# Patient Record
Sex: MALE | Race: WHITE | URBAN - METROPOLITAN AREA
[De-identification: names, ages, dates, MRNs, and addresses within clinical notes are randomized per-mention and may not be internally consistent; named-entity substitution may affect disease eponyms.]

---

## 2017-06-12 ENCOUNTER — HOSPITAL ENCOUNTER (OUTPATIENT)
Dept: VASCULAR LAB | Age: 62
Discharge: HOME | End: 2017-06-12
Attending: NURSE PRACTITIONER | Admitting: NURSE PRACTITIONER

## 2017-06-12 DIAGNOSIS — M79.604 PAIN OF RIGHT LEG: ICD-10-CM

## 2017-12-26 PROBLEM — I10 HTN (HYPERTENSION): Status: ACTIVE | Noted: 2017-12-26

## 2017-12-26 PROBLEM — I63.9 CVA (CEREBRAL VASCULAR ACCIDENT) (HCC): Status: ACTIVE | Noted: 2017-12-26

## 2017-12-26 PROBLEM — R47.01 APHASIA: Status: ACTIVE | Noted: 2017-12-26

## 2017-12-26 PROBLEM — I65.29 CAROTID STENOSIS: Status: ACTIVE | Noted: 2017-12-26

## 2017-12-26 PROBLEM — B19.20 HEPATITIS C: Status: ACTIVE | Noted: 2017-12-26

## 2020-11-03 PROBLEM — I10 HTN (HYPERTENSION): Status: RESOLVED | Noted: 2017-12-26 | Resolved: 2020-11-03

## 2023-03-06 LAB
ALBUMIN SERPL-MCNC: 4.4 G/DL
ALP BLD-CCNC: 70 U/L
ALT SERPL-CCNC: 22 U/L
ANION GAP SERPL CALCULATED.3IONS-SCNC: 11 MMOL/L
AST SERPL-CCNC: 25 U/L
BILIRUB SERPL-MCNC: 0.4 MG/DL (ref 0.1–1.4)
BUN BLDV-MCNC: 19 MG/DL
CALCIUM SERPL-MCNC: 10 MG/DL
CHLORIDE BLD-SCNC: 107 MMOL/L
CHOLESTEROL, TOTAL: 138 MG/DL
CHOLESTEROL/HDL RATIO: 3.2
CO2: 25 MMOL/L
CREAT SERPL-MCNC: 1.28 MG/DL
EGFR: 61
GLUCOSE BLD-MCNC: 92 MG/DL
HDLC SERPL-MCNC: 43 MG/DL (ref 35–70)
LDL CHOLESTEROL CALCULATED: 78 MG/DL (ref 0–160)
NONHDLC SERPL-MCNC: NORMAL MG/DL
POTASSIUM SERPL-SCNC: 4.6 MMOL/L
SODIUM BLD-SCNC: 143 MMOL/L
TOTAL PROTEIN: 7.5
TRIGL SERPL-MCNC: 85 MG/DL
VLDLC SERPL CALC-MCNC: NORMAL MG/DL

## 2023-04-17 ENCOUNTER — OFFICE VISIT (OUTPATIENT)
Dept: INTERNAL MEDICINE CLINIC | Age: 68
End: 2023-04-17

## 2023-04-17 VITALS
OXYGEN SATURATION: 98 % | WEIGHT: 169 LBS | HEIGHT: 73 IN | HEART RATE: 79 BPM | BODY MASS INDEX: 22.4 KG/M2 | SYSTOLIC BLOOD PRESSURE: 138 MMHG | DIASTOLIC BLOOD PRESSURE: 74 MMHG

## 2023-04-17 DIAGNOSIS — Z91.09 ENVIRONMENTAL ALLERGIES: ICD-10-CM

## 2023-04-17 DIAGNOSIS — I65.23 BILATERAL CAROTID ARTERY STENOSIS: ICD-10-CM

## 2023-04-17 DIAGNOSIS — Z86.79 HISTORY OF CAROTID STENOSIS: ICD-10-CM

## 2023-04-17 DIAGNOSIS — I10 HTN (HYPERTENSION), BENIGN: ICD-10-CM

## 2023-04-17 DIAGNOSIS — Z00.00 ANNUAL PHYSICAL EXAM: Primary | ICD-10-CM

## 2023-04-17 DIAGNOSIS — F17.200 TOBACCO DEPENDENCE: ICD-10-CM

## 2023-04-17 DIAGNOSIS — E78.5 DYSLIPIDEMIA: ICD-10-CM

## 2023-04-17 DIAGNOSIS — Z23 NEED FOR STREPTOCOCCUS PNEUMONIAE VACCINATION: ICD-10-CM

## 2023-04-17 PROBLEM — R47.01 APHASIA: Status: RESOLVED | Noted: 2017-12-26 | Resolved: 2023-04-17

## 2023-04-17 RX ORDER — BUPROPION HYDROCHLORIDE 150 MG/1
150 TABLET, EXTENDED RELEASE ORAL 2 TIMES DAILY
Qty: 60 TABLET | Refills: 0 | Status: SHIPPED | OUTPATIENT
Start: 2023-04-17

## 2023-04-17 RX ORDER — CETIRIZINE HYDROCHLORIDE 10 MG/1
10 TABLET ORAL NIGHTLY
Qty: 30 TABLET | Refills: 11 | Status: SHIPPED | OUTPATIENT
Start: 2023-04-17

## 2023-04-17 RX ORDER — LORATADINE 10 MG/1
10 TABLET ORAL DAILY
COMMUNITY

## 2023-04-17 RX ORDER — FLUTICASONE PROPIONATE 50 MCG
2 SPRAY, SUSPENSION (ML) NASAL DAILY
Qty: 1 EACH | Refills: 11 | Status: SHIPPED | OUTPATIENT
Start: 2023-04-17

## 2023-04-17 SDOH — ECONOMIC STABILITY: FOOD INSECURITY: WITHIN THE PAST 12 MONTHS, YOU WORRIED THAT YOUR FOOD WOULD RUN OUT BEFORE YOU GOT MONEY TO BUY MORE.: NEVER TRUE

## 2023-04-17 SDOH — ECONOMIC STABILITY: HOUSING INSECURITY
IN THE LAST 12 MONTHS, WAS THERE A TIME WHEN YOU DID NOT HAVE A STEADY PLACE TO SLEEP OR SLEPT IN A SHELTER (INCLUDING NOW)?: NO

## 2023-04-17 SDOH — ECONOMIC STABILITY: FOOD INSECURITY: WITHIN THE PAST 12 MONTHS, THE FOOD YOU BOUGHT JUST DIDN'T LAST AND YOU DIDN'T HAVE MONEY TO GET MORE.: NEVER TRUE

## 2023-04-17 SDOH — ECONOMIC STABILITY: INCOME INSECURITY: HOW HARD IS IT FOR YOU TO PAY FOR THE VERY BASICS LIKE FOOD, HOUSING, MEDICAL CARE, AND HEATING?: NOT HARD AT ALL

## 2023-04-17 ASSESSMENT — PATIENT HEALTH QUESTIONNAIRE - PHQ9
SUM OF ALL RESPONSES TO PHQ9 QUESTIONS 1 & 2: 0
SUM OF ALL RESPONSES TO PHQ QUESTIONS 1-9: 0
1. LITTLE INTEREST OR PLEASURE IN DOING THINGS: 0
2. FEELING DOWN, DEPRESSED OR HOPELESS: 0
SUM OF ALL RESPONSES TO PHQ QUESTIONS 1-9: 0

## 2023-04-17 NOTE — PROGRESS NOTES
Stability: Unknown    Unable to Pay for Housing in the Last Year: Not on file    Number of Places Lived in the Last Year: Not on file    Unstable Housing in the Last Year: No       Review of Systems:  A comprehensive review of systems was negative except for what was noted in the HPI. Physical Exam:   Vitals:    04/17/23 1428   BP: 138/74   Pulse: 79   SpO2: 98%   Weight: 169 lb (76.7 kg)   Height: 6' 1\" (1.854 m)     Body mass index is 22.3 kg/m². Constitutional: He is oriented to person, place, and time. He appears well-developed and well-nourished. No distress. HEENT:   Head: Normocephalic and atraumatic. Right Ear: Tympanic membrane, external ear and ear canal normal.   Left Ear: Tympanic membrane, external ear and ear canal normal.   Mouth/Throat: Oropharynx is clear and moist and mucous membranes are normal. No oropharyngeal exudate or posterior oropharyngeal erythema. He has no cervical adenopathy. Eyes: Conjunctivae and extraocular motions are normal. Pupils are equal, round, and reactive to light. Neck:  Supple. No JVD present. Carotid bruit is not present. No mass and no thyromegaly present. Cardiovascular: Normal rate, regular rhythm, normal heart sounds and intact distal pulses. Pulmonary/Chest: Effort normal and breath sounds normal. No respiratory distress. He has no wheezes, rhonchi or rales. Abdominal: Soft, non-tender. Bowel sounds and aorta are normal. There is no organomegaly, mass or bruit. Musculoskeletal: Normal range of motion. He exhibits no edema. Neurological: He is alert and oriented to person, place, and time. He has normal reflexes. No cranial nerve deficit. Coordination normal.   Skin: Skin is warm, dry and intact. No suspicious lesions are noted.     Preventive Care:  Health Maintenance   Topic Date Due    Depression Screen  Never done    Colorectal Cancer Screen  Never done    Pneumococcal 65+ years Vaccine (2 - PPSV23 if available, else PCV20) 02/24/2020

## 2023-04-18 LAB
BASOPHILS # BLD: 0.1 K/UL (ref 0–0.2)
BASOPHILS NFR BLD: 1.1 %
DEPRECATED RDW RBC AUTO: 14.6 % (ref 12.4–15.4)
EOSINOPHIL # BLD: 0.3 K/UL (ref 0–0.6)
EOSINOPHIL NFR BLD: 3.8 %
HCT VFR BLD AUTO: 47 % (ref 40.5–52.5)
HGB BLD-MCNC: 15.7 G/DL (ref 13.5–17.5)
LYMPHOCYTES # BLD: 2.2 K/UL (ref 1–5.1)
LYMPHOCYTES NFR BLD: 28.1 %
MCH RBC QN AUTO: 29.7 PG (ref 26–34)
MCHC RBC AUTO-ENTMCNC: 33.3 G/DL (ref 31–36)
MCV RBC AUTO: 89 FL (ref 80–100)
MONOCYTES # BLD: 0.9 K/UL (ref 0–1.3)
MONOCYTES NFR BLD: 11.4 %
NEUTROPHILS # BLD: 4.3 K/UL (ref 1.7–7.7)
NEUTROPHILS NFR BLD: 55.6 %
PLATELET # BLD AUTO: 212 K/UL (ref 135–450)
PMV BLD AUTO: 10.3 FL (ref 5–10.5)
RBC # BLD AUTO: 5.28 M/UL (ref 4.2–5.9)
WBC # BLD AUTO: 7.7 K/UL (ref 4–11)

## 2023-05-09 ENCOUNTER — TELEMEDICINE (OUTPATIENT)
Dept: INTERNAL MEDICINE CLINIC | Age: 68
End: 2023-05-09
Payer: COMMERCIAL

## 2023-05-09 DIAGNOSIS — F17.200 TOBACCO DEPENDENCE: ICD-10-CM

## 2023-05-09 DIAGNOSIS — Z12.11 SCREEN FOR COLON CANCER: ICD-10-CM

## 2023-05-09 DIAGNOSIS — Z91.09 ENVIRONMENTAL ALLERGIES: Primary | ICD-10-CM

## 2023-05-09 PROCEDURE — 1123F ACP DISCUSS/DSCN MKR DOCD: CPT | Performed by: INTERNAL MEDICINE

## 2023-05-09 PROCEDURE — 99213 OFFICE O/P EST LOW 20 MIN: CPT | Performed by: INTERNAL MEDICINE

## 2023-05-09 NOTE — PROGRESS NOTES
Pursuant to the emergency declaration under the [facility] waiver authority and the [email or web address] and [employer] Act, this Virtual  Video Visit was conducted, with patient's consent, to reduce the patient's risk of exposure to COVID-19 and provide continuity of care. Service is  provided through a video synchronous discussion virtually to substitute for in-person clinic visit with the patient being at home and Dr. Anali Pierson being at home. Patient consent to the video visit. Date of Service:  5/9/2023    Chief Complaint:      Chief Complaint   Patient presents with    Nicotine Dependence     Down 5 cigarettes per day. Assessment/Plan:    Triny Diaz was seen today for nicotine dependence. Diagnoses and all orders for this visit:    Environmental allergies  Increase flonase 1 spray in each nostril twice a day    Tobacco dependence  Continue to try to quit smoking on his own and start on wellbutrin if can't cut down anymore on his own. Screen for colon cancer  -     Fecal DNA Colorectal cancer screening (Cologuard)    Stable and continue on current medications. Return VV BP, cholesterol, allergies and tob use 10/10. HPI:  Todd Morrow is a 69 y.o. He has not started on the wellbutrin sr 150 mg yet since he's trying to cut back on his own going from 1 to 3/4 ppd currently. Hyperlipidemia:  No new myalgias or GI upset on atorvastatin (Lipitor) 80 mg qd. Medication compliance: compliant most of the time. Patient is  following a low fat, low cholesterol diet. He is  exercising regularly. Hypertension:  Home blood pressure monitoring: Yes -130/70 on amlodipine 5 mg daily and lisinopril 40 mg qd. He is adherent to a low sodium diet. Patient denies chest pain, shortness of breath, headache, lightheadedness, blurred vision, peripheral edema, palpitations, dry cough, and fatigue.   Antihypertensive medication side effects:

## 2023-05-14 DIAGNOSIS — F17.200 TOBACCO DEPENDENCE: ICD-10-CM

## 2023-05-15 RX ORDER — BUPROPION HYDROCHLORIDE 150 MG/1
TABLET, EXTENDED RELEASE ORAL
Qty: 60 TABLET | Refills: 0 | OUTPATIENT
Start: 2023-05-15

## 2023-06-27 DIAGNOSIS — I63.9 CEREBROVASCULAR ACCIDENT (CVA), UNSPECIFIED MECHANISM (HCC): ICD-10-CM

## 2023-06-27 DIAGNOSIS — I10 HTN (HYPERTENSION), BENIGN: Primary | ICD-10-CM

## 2023-06-27 RX ORDER — AMLODIPINE BESYLATE 5 MG/1
5 TABLET ORAL DAILY
Qty: 90 TABLET | Refills: 2 | Status: SHIPPED | OUTPATIENT
Start: 2023-06-27

## 2023-06-27 RX ORDER — ASPIRIN 81 MG/1
81 TABLET ORAL DAILY
Qty: 90 TABLET | Refills: 2 | Status: SHIPPED | OUTPATIENT
Start: 2023-06-27

## 2023-10-10 ENCOUNTER — TELEMEDICINE (OUTPATIENT)
Dept: INTERNAL MEDICINE CLINIC | Age: 68
End: 2023-10-10
Payer: COMMERCIAL

## 2023-10-10 DIAGNOSIS — I10 HTN (HYPERTENSION), BENIGN: Primary | ICD-10-CM

## 2023-10-10 DIAGNOSIS — Z86.79 HISTORY OF CAROTID STENOSIS: ICD-10-CM

## 2023-10-10 DIAGNOSIS — E78.5 DYSLIPIDEMIA: ICD-10-CM

## 2023-10-10 DIAGNOSIS — Z86.73 HISTORY OF CVA (CEREBROVASCULAR ACCIDENT): ICD-10-CM

## 2023-10-10 DIAGNOSIS — Z91.09 ENVIRONMENTAL ALLERGIES: ICD-10-CM

## 2023-10-10 PROBLEM — Z86.19 HISTORY OF HEPATITIS C: Status: ACTIVE | Noted: 2017-12-26

## 2023-10-10 PROBLEM — Z86.19 HISTORY OF HEPATITIS C: Status: RESOLVED | Noted: 2017-12-26 | Resolved: 2023-10-10

## 2023-10-10 PROCEDURE — 99214 OFFICE O/P EST MOD 30 MIN: CPT | Performed by: INTERNAL MEDICINE

## 2023-10-10 PROCEDURE — 1123F ACP DISCUSS/DSCN MKR DOCD: CPT | Performed by: INTERNAL MEDICINE

## 2023-10-10 RX ORDER — LISINOPRIL 40 MG/1
40 TABLET ORAL DAILY
Qty: 90 TABLET | Refills: 1 | Status: SHIPPED | OUTPATIENT
Start: 2023-10-10

## 2023-10-10 NOTE — PROGRESS NOTES
Patient was evaluated through a synchronous (real-time) video encounter. Patient identification was verified at the start of the visit. She (or guardian if applicable) is aware that this is a billable service, which includes applicable co-pays. This visit was conducted with the patient's (and/or legal guardian's) verbal consent. She has not had a related appointment within my department in the past 7 days or scheduled within the next 24 hours. The patient was located at Home: [address] [place] [address]. The provider was located at Home ([address] [place] [address]): [place]. Date of Service:  10/10/2023    Chief Complaint:      Chief Complaint   Patient presents with    Hyperlipidemia    Hypertension    Allergies       Assessment/Plan:    Mindy Sloan was seen today for hyperlipidemia, hypertension and allergies. Diagnoses and all orders for this visit:    HTN (hypertension), benign  -     lisinopril (PRINIVIL;ZESTRIL) 40 MG tablet; Take 1 tablet by mouth daily    Dyslipidemia    Environmental allergies    History of carotid stenosis    History of CVA (cerebrovascular accident)    Stable and continue on current medications. Return Fasting PE 4/8. HPI:  Radha Castle is a 69 y.o. He woke up this morning with some yellowish nasal discharge and clear the rest of the day. No face pain, fever or chills. Hyperlipidemia:  No new myalgias or GI upset on atorvastatin (Lipitor) 80 mg daily per neurologist. Medication compliance: compliant most of the time. Patient is  following a low fat, low cholesterol diet. He is  exercising regularly. Hypertension:  Home blood pressure monitoring: Yes -130/70 on amlodipine 5 mg daily and lisinopril 40 mg qd. He is adherent to a low sodium diet. Patient denies chest pain, shortness of breath, headache, lightheadedness, blurred vision, peripheral edema, palpitations, dry cough, and fatigue. Antihypertensive medication side effects: no medication side effects noted.   Use of agents

## 2023-10-19 ENCOUNTER — TELEMEDICINE (OUTPATIENT)
Dept: INTERNAL MEDICINE CLINIC | Age: 68
End: 2023-10-19
Payer: COMMERCIAL

## 2023-10-19 DIAGNOSIS — U07.1 COVID-19 VIRUS INFECTION: Primary | ICD-10-CM

## 2023-10-19 DIAGNOSIS — R06.00 DYSPNEA DUE TO COVID-19: ICD-10-CM

## 2023-10-19 DIAGNOSIS — U07.1 DYSPNEA DUE TO COVID-19: ICD-10-CM

## 2023-10-19 PROCEDURE — 1123F ACP DISCUSS/DSCN MKR DOCD: CPT | Performed by: INTERNAL MEDICINE

## 2023-10-19 PROCEDURE — 99213 OFFICE O/P EST LOW 20 MIN: CPT | Performed by: INTERNAL MEDICINE

## 2023-10-19 RX ORDER — ALBUTEROL SULFATE 90 UG/1
2 AEROSOL, METERED RESPIRATORY (INHALATION) 4 TIMES DAILY PRN
Qty: 18 G | Refills: 0 | Status: SHIPPED | OUTPATIENT
Start: 2023-10-19

## 2023-10-19 NOTE — PROGRESS NOTES
Patient was evaluated through a synchronous (real-time) video encounter. Patient identification was verified at the start of the visit. She (or guardian if applicable) is aware that this is a billable service, which includes applicable co-pays. This visit was conducted with the patient's (and/or legal guardian's) verbal consent. She has not had a related appointment within my department in the past 7 days or scheduled within the next 24 hours. The patient was located at Home: [address] [place] [address]. The provider was located at Home ([address] [place] [address]): [place]. Date of Service:  10/19/2023    Chief Complaint:      Chief Complaint   Patient presents with    Cough     Worse since 10-10 visit. Neg Covid test 10-18-23    Congestion     Chest and sinus    Chills    Nausea       Assessment/Plan:    Marielos Armendariz was seen today for cough, congestion, chills and nausea. Diagnoses and all orders for this visit:    COVID-19 virus infection  -     nirmatrelvir/ritonavir 300/100 (PAXLOVID, 300/100,) 20 x 150 MG & 10 x 100MG TBPK; Take 3 tablets (two 150 mg nirmatrelvir and one 100 mg ritonavir tablets) by mouth every 12 hours for 5 days. Dyspnea due to COVID-19  -     albuterol sulfate HFA (VENTOLIN HFA) 108 (90 Base) MCG/ACT inhaler; Inhale 2 puffs into the lungs 4 times daily as needed for Wheezing      Return if symptoms worsen or fail to improve. HPI:  Martha Whalen is a 69 y.o. He started with cold symptoms 9 days ago and then progressed to increase fatigue, myalgia, fever or chills 2 days ago. Covid test came back positive yesterday and today. He has a cough and mild dyspnea on exertion.     No results found for: \"LABA1C\"  Lab Results   Component Value Date     03/06/2023    K 4.6 03/06/2023     03/06/2023    CO2 25 03/06/2023    BUN 19 03/06/2023    CREATININE 1.28 03/06/2023    GLUCOSE 92 03/06/2023    CALCIUM 10.0 03/06/2023     Lab Results   Component Value Date/Time    CHOL 138 03/06/2023

## 2023-11-14 DIAGNOSIS — R06.00 DYSPNEA DUE TO COVID-19: ICD-10-CM

## 2023-11-14 DIAGNOSIS — U07.1 DYSPNEA DUE TO COVID-19: ICD-10-CM

## 2023-11-14 RX ORDER — ALBUTEROL SULFATE 90 UG/1
AEROSOL, METERED RESPIRATORY (INHALATION)
Qty: 18 G | Refills: 0 | OUTPATIENT
Start: 2023-11-14

## 2023-12-04 RX ORDER — ATORVASTATIN CALCIUM 80 MG/1
80 TABLET, FILM COATED ORAL NIGHTLY
Qty: 90 TABLET | Refills: 1 | Status: SHIPPED | OUTPATIENT
Start: 2023-12-04

## 2023-12-04 NOTE — TELEPHONE ENCOUNTER
LAST REFILL ?  Not sure, Rx from Dr. Steve Campbell 30     3 REFILLS  LAST VISIT 10-  NEXT VISIT 4-8-2023

## 2024-02-13 ENCOUNTER — APPOINTMENT (OUTPATIENT)
Dept: GENERAL RADIOLOGY | Age: 69
End: 2024-02-13
Payer: COMMERCIAL

## 2024-02-13 ENCOUNTER — HOSPITAL ENCOUNTER (INPATIENT)
Age: 69
LOS: 4 days | Discharge: HOME | End: 2024-02-17
Attending: EMERGENCY MEDICINE | Admitting: FAMILY MEDICINE
Payer: COMMERCIAL

## 2024-02-13 ENCOUNTER — APPOINTMENT (OUTPATIENT)
Dept: CT IMAGING | Age: 69
End: 2024-02-13
Payer: COMMERCIAL

## 2024-02-13 DIAGNOSIS — R79.89 ELEVATED TROPONIN: ICD-10-CM

## 2024-02-13 DIAGNOSIS — G93.31 POST-INFLUENZA SYNDROME: ICD-10-CM

## 2024-02-13 DIAGNOSIS — J18.9 PNEUMONIA DUE TO INFECTIOUS ORGANISM, UNSPECIFIED LATERALITY, UNSPECIFIED PART OF LUNG: Primary | ICD-10-CM

## 2024-02-13 PROBLEM — I24.89 DEMAND ISCHEMIA: Status: ACTIVE | Noted: 2024-02-13

## 2024-02-13 PROBLEM — N17.9 AKI (ACUTE KIDNEY INJURY) (HCC): Status: ACTIVE | Noted: 2024-02-13

## 2024-02-13 PROBLEM — A41.9 SEPSIS (HCC): Status: ACTIVE | Noted: 2024-02-13

## 2024-02-13 LAB
ANION GAP SERPL CALCULATED.3IONS-SCNC: 11 MMOL/L (ref 3–16)
BASOPHILS # BLD: 0.1 K/UL (ref 0–0.2)
BASOPHILS NFR BLD: 0.4 %
BUN SERPL-MCNC: 29 MG/DL (ref 7–20)
CALCIUM SERPL-MCNC: 9.7 MG/DL (ref 8.3–10.6)
CHLORIDE SERPL-SCNC: 98 MMOL/L (ref 99–110)
CO2 SERPL-SCNC: 27 MMOL/L (ref 21–32)
CREAT SERPL-MCNC: 1.4 MG/DL (ref 0.8–1.3)
DEPRECATED RDW RBC AUTO: 14.9 % (ref 12.4–15.4)
EOSINOPHIL # BLD: 0.1 K/UL (ref 0–0.6)
EOSINOPHIL NFR BLD: 0.4 %
GFR SERPLBLD CREATININE-BSD FMLA CKD-EPI: 54 ML/MIN/{1.73_M2}
GLUCOSE SERPL-MCNC: 113 MG/DL (ref 70–99)
HCT VFR BLD AUTO: 40.2 % (ref 40.5–52.5)
HGB BLD-MCNC: 13.1 G/DL (ref 13.5–17.5)
LACTATE BLDV-SCNC: 1.6 MMOL/L (ref 0.4–2)
LYMPHOCYTES # BLD: 1.8 K/UL (ref 1–5.1)
LYMPHOCYTES NFR BLD: 11.7 %
MCH RBC QN AUTO: 28.6 PG (ref 26–34)
MCHC RBC AUTO-ENTMCNC: 32.6 G/DL (ref 31–36)
MCV RBC AUTO: 87.7 FL (ref 80–100)
MONOCYTES # BLD: 1.5 K/UL (ref 0–1.3)
MONOCYTES NFR BLD: 10.2 %
NEUTROPHILS # BLD: 11.8 K/UL (ref 1.7–7.7)
NEUTROPHILS NFR BLD: 77.3 %
PLATELET # BLD AUTO: 377 K/UL (ref 135–450)
PMV BLD AUTO: 8.9 FL (ref 5–10.5)
POTASSIUM SERPL-SCNC: 4.6 MMOL/L (ref 3.5–5.1)
RBC # BLD AUTO: 4.58 M/UL (ref 4.2–5.9)
SODIUM SERPL-SCNC: 136 MMOL/L (ref 136–145)
TROPONIN, HIGH SENSITIVITY: 31 NG/L (ref 0–22)
TROPONIN, HIGH SENSITIVITY: 32 NG/L (ref 0–22)
WBC # BLD AUTO: 15.2 K/UL (ref 4–11)

## 2024-02-13 PROCEDURE — 99285 EMERGENCY DEPT VISIT HI MDM: CPT

## 2024-02-13 PROCEDURE — 1200000000 HC SEMI PRIVATE

## 2024-02-13 PROCEDURE — 2580000003 HC RX 258: Performed by: EMERGENCY MEDICINE

## 2024-02-13 PROCEDURE — 71046 X-RAY EXAM CHEST 2 VIEWS: CPT

## 2024-02-13 PROCEDURE — 80048 BASIC METABOLIC PNL TOTAL CA: CPT

## 2024-02-13 PROCEDURE — 71260 CT THORAX DX C+: CPT

## 2024-02-13 PROCEDURE — 93005 ELECTROCARDIOGRAM TRACING: CPT | Performed by: EMERGENCY MEDICINE

## 2024-02-13 PROCEDURE — 6360000002 HC RX W HCPCS: Performed by: EMERGENCY MEDICINE

## 2024-02-13 PROCEDURE — 87040 BLOOD CULTURE FOR BACTERIA: CPT

## 2024-02-13 PROCEDURE — 96367 TX/PROPH/DG ADDL SEQ IV INF: CPT

## 2024-02-13 PROCEDURE — 85025 COMPLETE CBC W/AUTO DIFF WBC: CPT

## 2024-02-13 PROCEDURE — 6360000004 HC RX CONTRAST MEDICATION: Performed by: EMERGENCY MEDICINE

## 2024-02-13 PROCEDURE — 96361 HYDRATE IV INFUSION ADD-ON: CPT

## 2024-02-13 PROCEDURE — 96365 THER/PROPH/DIAG IV INF INIT: CPT

## 2024-02-13 PROCEDURE — 2500000003 HC RX 250 WO HCPCS: Performed by: EMERGENCY MEDICINE

## 2024-02-13 PROCEDURE — 84484 ASSAY OF TROPONIN QUANT: CPT

## 2024-02-13 PROCEDURE — 83605 ASSAY OF LACTIC ACID: CPT

## 2024-02-13 RX ORDER — 0.9 % SODIUM CHLORIDE 0.9 %
1000 INTRAVENOUS SOLUTION INTRAVENOUS ONCE
Status: COMPLETED | OUTPATIENT
Start: 2024-02-13 | End: 2024-02-13

## 2024-02-13 RX ADMIN — DOXYCYCLINE 100 MG: 100 INJECTION, POWDER, LYOPHILIZED, FOR SOLUTION INTRAVENOUS at 22:13

## 2024-02-13 RX ADMIN — CEFTRIAXONE SODIUM 1000 MG: 1 INJECTION, POWDER, FOR SOLUTION INTRAMUSCULAR; INTRAVENOUS at 21:40

## 2024-02-13 RX ADMIN — SODIUM CHLORIDE 1000 ML: 9 INJECTION, SOLUTION INTRAVENOUS at 19:28

## 2024-02-13 RX ADMIN — IOPAMIDOL 75 ML: 755 INJECTION, SOLUTION INTRAVENOUS at 20:06

## 2024-02-13 ASSESSMENT — LIFESTYLE VARIABLES
HOW MANY STANDARD DRINKS CONTAINING ALCOHOL DO YOU HAVE ON A TYPICAL DAY: PATIENT DOES NOT DRINK
HOW OFTEN DO YOU HAVE A DRINK CONTAINING ALCOHOL: NEVER

## 2024-02-13 ASSESSMENT — PAIN SCALES - GENERAL: PAINLEVEL_OUTOF10: 0

## 2024-02-13 ASSESSMENT — PAIN - FUNCTIONAL ASSESSMENT: PAIN_FUNCTIONAL_ASSESSMENT: 0-10

## 2024-02-14 PROBLEM — J44.1 COPD EXACERBATION (HCC): Status: ACTIVE | Noted: 2024-02-14

## 2024-02-14 PROBLEM — J96.01 ACUTE RESPIRATORY FAILURE WITH HYPOXIA (HCC): Status: ACTIVE | Noted: 2024-02-14

## 2024-02-14 LAB
ALBUMIN SERPL-MCNC: 3.2 G/DL (ref 3.4–5)
ALBUMIN/GLOB SERPL: 0.8 {RATIO} (ref 1.1–2.2)
ALP SERPL-CCNC: 81 U/L (ref 40–129)
ALT SERPL-CCNC: 45 U/L (ref 10–40)
ANION GAP SERPL CALCULATED.3IONS-SCNC: 12 MMOL/L (ref 3–16)
AST SERPL-CCNC: 28 U/L (ref 15–37)
BASOPHILS # BLD: 0 K/UL (ref 0–0.2)
BASOPHILS NFR BLD: 0 %
BILIRUB SERPL-MCNC: 0.6 MG/DL (ref 0–1)
BUN SERPL-MCNC: 25 MG/DL (ref 7–20)
CALCIUM SERPL-MCNC: 9.1 MG/DL (ref 8.3–10.6)
CHLORIDE SERPL-SCNC: 102 MMOL/L (ref 99–110)
CO2 SERPL-SCNC: 23 MMOL/L (ref 21–32)
CREAT SERPL-MCNC: 1.2 MG/DL (ref 0.8–1.3)
DEPRECATED RDW RBC AUTO: 14.9 % (ref 12.4–15.4)
EKG ATRIAL RATE: 108 BPM
EKG DIAGNOSIS: NORMAL
EKG P AXIS: 71 DEGREES
EKG P-R INTERVAL: 120 MS
EKG Q-T INTERVAL: 290 MS
EKG QRS DURATION: 80 MS
EKG QTC CALCULATION (BAZETT): 388 MS
EKG R AXIS: 52 DEGREES
EKG T AXIS: 54 DEGREES
EKG VENTRICULAR RATE: 108 BPM
EOSINOPHIL # BLD: 0 K/UL (ref 0–0.6)
EOSINOPHIL NFR BLD: 0 %
GFR SERPLBLD CREATININE-BSD FMLA CKD-EPI: >60 ML/MIN/{1.73_M2}
GLUCOSE SERPL-MCNC: 106 MG/DL (ref 70–99)
HCT VFR BLD AUTO: 38.3 % (ref 40.5–52.5)
HGB BLD-MCNC: 12.5 G/DL (ref 13.5–17.5)
LYMPHOCYTES # BLD: 1.8 K/UL (ref 1–5.1)
LYMPHOCYTES NFR BLD: 13 %
MCH RBC QN AUTO: 28.5 PG (ref 26–34)
MCHC RBC AUTO-ENTMCNC: 32.6 G/DL (ref 31–36)
MCV RBC AUTO: 87.6 FL (ref 80–100)
MONOCYTES # BLD: 2 K/UL (ref 0–1.3)
MONOCYTES NFR BLD: 14 %
NEUTROPHILS # BLD: 10.2 K/UL (ref 1.7–7.7)
NEUTROPHILS NFR BLD: 70 %
NEUTS BAND NFR BLD MANUAL: 3 % (ref 0–7)
PATH INTERP BLD-IMP: NORMAL
PATH INTERP BLD-IMP: YES
PLATELET # BLD AUTO: 368 K/UL (ref 135–450)
PLATELET BLD QL SMEAR: ADEQUATE
PMV BLD AUTO: 9.1 FL (ref 5–10.5)
POTASSIUM SERPL-SCNC: 4.6 MMOL/L (ref 3.5–5.1)
PROT SERPL-MCNC: 7.2 G/DL (ref 6.4–8.2)
RBC # BLD AUTO: 4.37 M/UL (ref 4.2–5.9)
RBC MORPH BLD: NORMAL
SLIDE REVIEW: ABNORMAL
SODIUM SERPL-SCNC: 137 MMOL/L (ref 136–145)
TROPONIN, HIGH SENSITIVITY: 28 NG/L (ref 0–22)
TROPONIN, HIGH SENSITIVITY: 34 NG/L (ref 0–22)
WBC # BLD AUTO: 14 K/UL (ref 4–11)

## 2024-02-14 PROCEDURE — 6370000000 HC RX 637 (ALT 250 FOR IP): Performed by: FAMILY MEDICINE

## 2024-02-14 PROCEDURE — 36415 COLL VENOUS BLD VENIPUNCTURE: CPT

## 2024-02-14 PROCEDURE — 6360000002 HC RX W HCPCS: Performed by: FAMILY MEDICINE

## 2024-02-14 PROCEDURE — 80053 COMPREHEN METABOLIC PANEL: CPT

## 2024-02-14 PROCEDURE — 2580000003 HC RX 258: Performed by: FAMILY MEDICINE

## 2024-02-14 PROCEDURE — 85025 COMPLETE CBC W/AUTO DIFF WBC: CPT

## 2024-02-14 PROCEDURE — 6370000000 HC RX 637 (ALT 250 FOR IP): Performed by: INTERNAL MEDICINE

## 2024-02-14 PROCEDURE — 93010 ELECTROCARDIOGRAM REPORT: CPT | Performed by: INTERNAL MEDICINE

## 2024-02-14 PROCEDURE — 2500000003 HC RX 250 WO HCPCS: Performed by: FAMILY MEDICINE

## 2024-02-14 PROCEDURE — 1200000000 HC SEMI PRIVATE

## 2024-02-14 PROCEDURE — 84484 ASSAY OF TROPONIN QUANT: CPT

## 2024-02-14 RX ORDER — SODIUM CHLORIDE 9 MG/ML
INJECTION, SOLUTION INTRAVENOUS PRN
Status: DISCONTINUED | OUTPATIENT
Start: 2024-02-14 | End: 2024-02-17 | Stop reason: HOSPADM

## 2024-02-14 RX ORDER — SODIUM CHLORIDE 0.9 % (FLUSH) 0.9 %
5-40 SYRINGE (ML) INJECTION EVERY 12 HOURS SCHEDULED
Status: DISCONTINUED | OUTPATIENT
Start: 2024-02-14 | End: 2024-02-17 | Stop reason: HOSPADM

## 2024-02-14 RX ORDER — ENOXAPARIN SODIUM 100 MG/ML
40 INJECTION SUBCUTANEOUS DAILY
Status: DISCONTINUED | OUTPATIENT
Start: 2024-02-14 | End: 2024-02-17 | Stop reason: HOSPADM

## 2024-02-14 RX ORDER — IPRATROPIUM BROMIDE AND ALBUTEROL SULFATE 2.5; .5 MG/3ML; MG/3ML
1 SOLUTION RESPIRATORY (INHALATION) EVERY 4 HOURS PRN
Status: DISCONTINUED | OUTPATIENT
Start: 2024-02-14 | End: 2024-02-17 | Stop reason: HOSPADM

## 2024-02-14 RX ORDER — SODIUM CHLORIDE 9 MG/ML
INJECTION, SOLUTION INTRAVENOUS CONTINUOUS
Status: ACTIVE | OUTPATIENT
Start: 2024-02-14 | End: 2024-02-14

## 2024-02-14 RX ORDER — AMLODIPINE BESYLATE 5 MG/1
5 TABLET ORAL DAILY
Status: DISCONTINUED | OUTPATIENT
Start: 2024-02-14 | End: 2024-02-17 | Stop reason: HOSPADM

## 2024-02-14 RX ORDER — CETIRIZINE HYDROCHLORIDE 10 MG/1
5 TABLET ORAL DAILY
Status: DISCONTINUED | OUTPATIENT
Start: 2024-02-14 | End: 2024-02-17 | Stop reason: HOSPADM

## 2024-02-14 RX ORDER — ALBUTEROL SULFATE 90 UG/1
2 AEROSOL, METERED RESPIRATORY (INHALATION) EVERY 6 HOURS PRN
Status: DISCONTINUED | OUTPATIENT
Start: 2024-02-14 | End: 2024-02-17 | Stop reason: HOSPADM

## 2024-02-14 RX ORDER — LISINOPRIL 20 MG/1
40 TABLET ORAL NIGHTLY
Status: DISCONTINUED | OUTPATIENT
Start: 2024-02-14 | End: 2024-02-17 | Stop reason: HOSPADM

## 2024-02-14 RX ORDER — ASPIRIN 81 MG/1
81 TABLET ORAL DAILY
Status: DISCONTINUED | OUTPATIENT
Start: 2024-02-14 | End: 2024-02-17 | Stop reason: HOSPADM

## 2024-02-14 RX ORDER — ATORVASTATIN CALCIUM 80 MG/1
80 TABLET, FILM COATED ORAL NIGHTLY
Status: DISCONTINUED | OUTPATIENT
Start: 2024-02-14 | End: 2024-02-17 | Stop reason: HOSPADM

## 2024-02-14 RX ORDER — MAGNESIUM HYDROXIDE/ALUMINUM HYDROXICE/SIMETHICONE 120; 1200; 1200 MG/30ML; MG/30ML; MG/30ML
30 SUSPENSION ORAL EVERY 6 HOURS PRN
Status: DISCONTINUED | OUTPATIENT
Start: 2024-02-14 | End: 2024-02-17 | Stop reason: HOSPADM

## 2024-02-14 RX ORDER — CODEINE PHOSPHATE AND GUAIFENESIN 10; 100 MG/5ML; MG/5ML
5 SOLUTION ORAL EVERY 4 HOURS PRN
Status: DISCONTINUED | OUTPATIENT
Start: 2024-02-14 | End: 2024-02-17 | Stop reason: HOSPADM

## 2024-02-14 RX ORDER — SODIUM CHLORIDE 0.9 % (FLUSH) 0.9 %
5-40 SYRINGE (ML) INJECTION PRN
Status: DISCONTINUED | OUTPATIENT
Start: 2024-02-14 | End: 2024-02-17 | Stop reason: HOSPADM

## 2024-02-14 RX ADMIN — AMLODIPINE BESYLATE 5 MG: 5 TABLET ORAL at 12:30

## 2024-02-14 RX ADMIN — DOXYCYCLINE 100 MG: 100 INJECTION, POWDER, LYOPHILIZED, FOR SOLUTION INTRAVENOUS at 21:18

## 2024-02-14 RX ADMIN — ENOXAPARIN SODIUM 40 MG: 100 INJECTION SUBCUTANEOUS at 09:39

## 2024-02-14 RX ADMIN — LISINOPRIL 40 MG: 20 TABLET ORAL at 21:12

## 2024-02-14 RX ADMIN — ATORVASTATIN CALCIUM 80 MG: 80 TABLET, FILM COATED ORAL at 21:13

## 2024-02-14 RX ADMIN — ASPIRIN 81 MG: 81 TABLET, COATED ORAL at 12:30

## 2024-02-14 RX ADMIN — CEFTRIAXONE SODIUM 1000 MG: 1 INJECTION, POWDER, FOR SOLUTION INTRAMUSCULAR; INTRAVENOUS at 22:12

## 2024-02-14 RX ADMIN — GUAIFENESIN AND CODEINE PHOSPHATE 5 ML: 100; 10 SOLUTION ORAL at 12:31

## 2024-02-14 RX ADMIN — DOXYCYCLINE 100 MG: 100 INJECTION, POWDER, LYOPHILIZED, FOR SOLUTION INTRAVENOUS at 09:42

## 2024-02-14 RX ADMIN — SODIUM CHLORIDE: 9 INJECTION, SOLUTION INTRAVENOUS at 01:07

## 2024-02-14 RX ADMIN — CETIRIZINE HYDROCHLORIDE 5 MG: 10 TABLET, FILM COATED ORAL at 12:31

## 2024-02-14 RX ADMIN — Medication 10 ML: at 21:14

## 2024-02-14 ASSESSMENT — PAIN SCALES - GENERAL
PAINLEVEL_OUTOF10: 0
PAINLEVEL_OUTOF10: 8

## 2024-02-14 ASSESSMENT — PAIN DESCRIPTION - ORIENTATION: ORIENTATION: RIGHT;LEFT

## 2024-02-14 ASSESSMENT — PAIN DESCRIPTION - LOCATION: LOCATION: LEG

## 2024-02-14 NOTE — RT PROTOCOL NOTE
RT Inhaler-Nebulizer Bronchodilator Protocol Note    There is a bronchodilator order in the chart from a provider indicating to follow the RT Bronchodilator Protocol and there is an “Initiate RT Inhaler-Nebulizer Bronchodilator Protocol” order as well (see protocol at bottom of note).    CXR Findings:  No results found.    The findings from the last RT Protocol Assessment were as follows:   History Pulmonary Disease: None or smoker <15 pack years  Respiratory Pattern: Regular pattern and RR 12-20 bpm  Breath Sounds: Slightly diminished and/or crackles  Cough: Strong, spontaneous, non-productive  Indication for Bronchodilator Therapy:    Bronchodilator Assessment Score: 2    Aerosolized bronchodilator medication orders have been revised according to the RT Inhaler-Nebulizer Bronchodilator Protocol below.    Respiratory Therapist to perform RT Therapy Protocol Assessment initially then follow the protocol.  Repeat RT Therapy Protocol Assessment PRN for score 0-3 or on second treatment, BID, and PRN for scores above 3.    No Indications - adjust the frequency to every 6 hours PRN wheezing or bronchospasm, if no treatments needed after 48 hours then discontinue using Per Protocol order mode.     If indication present, adjust the RT bronchodilator orders based on the Bronchodilator Assessment Score as indicated below.  Use Inhaler orders unless patient has one or more of the following: on home nebulizer, not able to hold breath for 10 seconds, is not alert and oriented, cannot activate and use MDI correctly, or respiratory rate 25 breaths per minute or more, then use the equivalent nebulizer order(s) with same Frequency and PRN reasons based on the score.  If a patient is on this medication at home then do not decrease Frequency below that used at home.    0-3 - enter or revise RT bronchodilator order(s) to equivalent RT Bronchodilator order with Frequency of every 4 hours PRN for wheezing or increased work of breathing

## 2024-02-14 NOTE — ASSESSMENT & PLAN NOTE
In setting of sepsis and hypoxia.  Chest pain-free.  Overall low suspicion for ACS.  No clear clinical indication for therapeutic anticoagulation at this time.  Monitor on telemetry.  Low threshold to involve cardiology team pending clinical course.  Overall flat troponin trend.  Repeat levels in am.

## 2024-02-14 NOTE — ED PROVIDER NOTES
[facility]  ED  EMERGENCY DEPARTMENT ENCOUNTER        Patient Name: Dawood Epps  MRN: [id number]  Birthdate 1955  Date of evaluation: 2/13/2024  Provider: Italo Mckeon MD  PCP: Suha Ruff MD  Note Started: 7:25 PM EST 2/13/24    CHIEF COMPLAINT       Influenza (Patient states he was diagnosed with the flu two weeks ago. States shortness of breath, worse with ambulation. ) and Fatigue      HISTORY OF PRESENT ILLNESS: 1 or more Elements     History from : Patient    Limitations to history : None    Dawood Epps is a 68 y.o. male who presents for evaluation of fatigue, shortness of breath.  Patient states that he was diagnosed with the flu several weeks ago.  He states that he has had continued shortness of breath since the diagnosis.  He has had worsening dyspnea on exertion.  He denies any specific chest pain.  No new leg swelling.  He denies history of cardiac or lung disease.  He states that he has had a productive cough.  He has been generally fatigued.    Nursing Notes were all reviewed and agreed with or any disagreements were addressed in the HPI.    REVIEW OF SYSTEMS :      Review of Systems    Positives and Pertinent negatives as per HPI.     SURGICAL HISTORY     Past Surgical History:   Procedure Laterality Date    ABDOMINAL AORTIC ANEURYSM REPAIR  2020    CAROTID ARTERY SURGERY Bilateral 2018    HERNIA REPAIR Bilateral 2017    bilateral inguinal hernia repair    TONSILLECTOMY AND ADENOIDECTOMY         CURRENTMEDICATIONS       Previous Medications    ALBUTEROL SULFATE HFA (VENTOLIN HFA) 108 (90 BASE) MCG/ACT INHALER    Inhale 2 puffs into the lungs 4 times daily as needed for Wheezing    AMLODIPINE (NORVASC) 5 MG TABLET    Take 1 tablet by mouth daily    ASPIRIN 81 MG EC TABLET    Take 1 tablet by mouth daily    ATORVASTATIN (LIPITOR) 80 MG TABLET    Take 1 tablet by mouth nightly    CETIRIZINE (ZYRTEC) 10 MG TABLET    Take 1 tablet by mouth at bedtime    FLUTICASONE (FLONASE) 50

## 2024-02-14 NOTE — CARE COORDINATION
Case Management Assessment  Initial Evaluation    Date/Time of Evaluation: 2/14/2024 10:53 AM  Assessment Completed by: Lien Live RN    If patient is discharged prior to next notation, then this note serves as note for discharge by case management.    Patient Name: Dawood Epps                   YOB: 1955  Diagnosis: Elevated troponin [R79.89]  Post-influenza syndrome [G93.31]  Sepsis (HCC) [A41.9]  Pneumonia due to infectious organism, unspecified laterality, unspecified part of lung [J18.9]                   Date / Time: 2/13/2024  6:39 PM    Patient Admission Status: Inpatient   Readmission Risk (Low < 19, Mod (19-27), High > 27): Readmission Risk Score: 9.7    Current PCP: Suha Ruff MD  PCP verified by CM? Yes    Chart Reviewed: Yes      History Provided by: Patient  Patient Orientation: Alert and Oriented    Patient Cognition: Alert    Hospitalization in the last 30 days (Readmission):  No    If yes, Readmission Assessment in CM Navigator will be completed.    Advance Directives:      Code Status: Full Code   Patient's Primary Decision Maker is: Legal Next of Kin    Primary Decision Maker: Anika Roque - Other    Discharge Planning:    Patient lives with: Spouse/Significant Other, Family Members Type of Home: House  Primary Care Giver: Self  Patient Support Systems include: Spouse/Significant Other   Current Financial resources: Medicare  Current community resources: None  Current services prior to admission: None            Current DME:              Type of Home Care services:  None    ADLS  Prior functional level: Independent in ADLs/IADLs  Current functional level: Independent in ADLs/IADLs    PT AM-PAC:   /24  OT AM-PAC:   /24    Family can provide assistance at DC: Yes  Would you like Case Management to discuss the discharge plan with any other family members/significant others, and if so, who? No  Plans to Return to Present Housing: Yes  Other Identified Issues/Barriers to  RETURNING to current housing: NONE at this time   Potential Assistance needed at discharge: N/A            Potential DME:    Patient expects to discharge to: House  Plan for transportation at discharge: Family    Financial    Payor: MEDICARE / Plan: MEDICARE PART A / Product Type: *No Product type* /     Does insurance require precert for SNF: No    Potential assistance Purchasing Medications: No  Meds-to-Beds request: Yes      [id number] - [place], [place] - [address] [phone] - F [phone]  [address] [facility] [place] [address]  Phone: [phone] Fax: [phone]      Notes:    Factors facilitating achievement of predicted outcomes: Family support, Friend support, Motivated, Cooperative, Pleasant, and Sense of humor    Barriers to discharge: Medication managment    Additional Case Management Notes: Pt IPTA in 2 story home w/spouse. Pt drives and ambulates in the community. No DMEs reported. Active PCP.     Pt is followed by IM. IV abx for treatment of PNA. Also admitted w/elevated troponin. Pt denied any needs at this time.     The Plan for Transition of Care is related to the following treatment goals of Elevated troponin [R79.89]  Post-influenza syndrome [G93.31]  Sepsis (HCC) [A41.9]  Pneumonia due to infectious organism, unspecified laterality, unspecified part of lung [J18.9]    IF APPLICABLE: The Patient and/or patient representative Dawood and his family were provided with a choice of provider and agrees with the discharge plan. Freedom of choice list with basic dialogue that supports the patient's individualized plan of care/goals and shares the quality data associated with the providers was provided to:     Patient Representative Name:       The Patient and/or Patient Representative Agree with the Discharge Plan?      Lien Live RN  Case Management Department

## 2024-02-14 NOTE — PROGRESS NOTES
Review of Systems:      Pertinent positives and negatives discussed in HPI    Objective:     Intake/Output Summary (Last 24 hours) at 2/14/2024 1716  Last data filed at 2/14/2024 1635  Gross per 24 hour   Intake 793.17 ml   Output --   Net 793.17 ml      Vitals:   Vitals:    02/14/24 0645 02/14/24 0755 02/14/24 1230 02/14/24 1615   BP: (!) 161/80 112/69 116/68 137/79   Pulse: 70 94 92 92   Resp: 24 20 20 18   Temp: 98.4 °F (36.9 °C) 98.2 °F (36.8 °C) 97.9 °F (36.6 °C) 98.4 °F (36.9 °C)   TempSrc: Oral Tympanic Oral Rectal   SpO2: 94% 92% 93% 94%   Weight:       Height:             Physical Exam:      General: NAD  Eyes: EOMI  ENT: neck supple  Cardiovascular: Regular rate.  Respiratory: hui rhonchi and extensive crackles   Gastrointestinal: Soft, non tender  Genitourinary: no suprapubic tenderness  Musculoskeletal: No edema  Skin: warm, dry  Neuro: Alert.  Psych: Mood appropriate.         Medications:   Medications:    sodium chloride flush  5-40 mL IntraVENous 2 times per day    enoxaparin  40 mg SubCUTAneous Daily    cefTRIAXone (ROCEPHIN) IV  1,000 mg IntraVENous Q24H    amLODIPine  5 mg Oral Daily    aspirin  81 mg Oral Daily    atorvastatin  80 mg Oral Nightly    lisinopril  40 mg Oral Nightly    cetirizine  5 mg Oral Daily    doxycycline (VIBRAMYCIN) IV  100 mg IntraVENous Q12H      Infusions:    sodium chloride       PRN Meds: sodium chloride flush, 5-40 mL, PRN  sodium chloride, , PRN  aluminum & magnesium hydroxide-simethicone, 30 mL, Q6H PRN  albuterol sulfate HFA, 2 puff, Q6H PRN  guaiFENesin-codeine, 5 mL, Q4H PRN        Labs and Imaging   CT CHEST PULMONARY EMBOLISM W CONTRAST    Result Date: 2/13/2024  EXAMINATION: CTA OF THE CHEST 2/13/2024 7:53 pm TECHNIQUE: CTA of the chest was performed after the administration of intravenous contrast.  Multiplanar reformatted images are provided for review.  MIP images are provided for review. Automated exposure control, iterative reconstruction, and/or  LEUKOCYTESUR Negative 06/24/2018 03:10 AM    UROBILINOGEN 0.2 06/24/2018 03:10 AM    BILIRUBINUR Negative 06/24/2018 03:10 AM    BLOODU Negative 06/24/2018 03:10 AM    GLUCOSEU Negative 06/24/2018 03:10 AM    KETUA Negative 06/24/2018 03:10 AM     Urine Cultures: No results found for: \"LABURIN\"  Blood Cultures: No results found for: \"BC\"  No results found for: \"BLOODCULT2\"  Organism: No results found for: \"ORG\"      Electronically signed by Gina Carbajal MD on 2/14/2024 at 5:16 PM

## 2024-02-14 NOTE — PROGRESS NOTES
Patient arrived on the unit via cot. Walked to bathroom then to bed. SOB with exertion however current SPO2 on RA is 92%.   Patient is hopeful of leaving tomorrow.   Pleasant, cooperative.     Comfort measures given. Snack provided.   Secondary insurance card provided by patient was called to registration at the patients request. A copy was placed in the patients chart.     Denies any additional needs. Call light in reach. Plan of care in progress.

## 2024-02-14 NOTE — ASSESSMENT & PLAN NOTE
Mild.  Gentle IV fluids.  Monitor renal function for now. Monitor I/O.  Avoid unnecessary nephrotoxic medications based on risk/beneift analysis.

## 2024-02-14 NOTE — OR NURSING
Patient reports that he has diarrhea for 2 weeks.  He just reports having liquid yellow bm.  His rectum has a 1cm excoriated are on rectal area right by anus whole.  Zinc ointment applied, Dr Carbajal informed of diarrhea and put in contact plus precautions until specimen can be collected.

## 2024-02-14 NOTE — ED NOTES
[id number]  
Pt given chicken broth  
Pt's p2 dropped to 88% while ambulating  
Telephone report w/ C5 RN.  
Alert-The patient is alert, awake and responds to voice. The patient is oriented to time, place, and person. The triage nurse is able to obtain subjective information.

## 2024-02-14 NOTE — PROGRESS NOTES
Patient has been resting in bed, eyes closed, lights down. Requested to be able to sleep. Attempting to group care and allow the patient to rest. Otherwise, no needs at this time.     Call light in reach. Plan of care in progress.

## 2024-02-14 NOTE — ASSESSMENT & PLAN NOTE
Likely secondary to multifocal pneumonia.  Lactic acid within normal limits.  Continue antibiotics.

## 2024-02-14 NOTE — PROGRESS NOTES
4 Eyes Skin Assessment     The patient is being assess for  Admission    I agree that 2 RN's have performed a thorough Head to Toe Skin Assessment on the patient. ALL assessment sites listed below have been assessed.       Areas assessed by both nurses: ***  []   Head, Face, and Ears   []   Shoulders, Back, and Chest  []   Arms, Elbows, and Hands   []   Coccyx, Sacrum, and Ischum  []   Legs, Feet, and Heels        Does the Patient have Skin Breakdown?  {Blank single:24896::\"No\",\"Yes a wound was noted on the Admission Assessment and an WOUND LDA was Initiated documentation include the [place]-wound, Wound Assessment, Measurements, Dressing Treatment, Drainage, and Color\",\"}         Martinez Prevention initiated:  {YES/NO:19732}   Wound Care Orders initiated:  {YES/NO:19732}      [facility] nurse consulted for Pressure Injury (Stage 3,4, Unstageable, DTI, NWPT, and Complex wounds):  {YES/NO:19732}      Nurse 1 eSignature: Electronically signed by Suha Gomez RN on 2/14/24 at 12:47 AM EST    **SHARE this note so that the co-signing nurse is able to place an eSignature**    Nurse 2 eSignature: {Esignature:[id number]}      Patient refuses skin assessment and denies having any skin concerns.

## 2024-02-14 NOTE — H&P
Hospitalist History and Physical        PCP: Suha Ruff MD    Date of Admission:  Patient seen and examined on night of admission 02/13/24      Anticipate greater than 2 midnights inpatient hospitalization will be needed to appropriately care for the patient's presenting issues.    Anticipated Discharge Location:  pending clinical course and functional status.      Assessment/Plan:      Principal Problem:    Sepsis (HCC)  Active Problems:    Multifocal pneumonia    Demand ischemia    SUNG (acute kidney injury) (HCC)    Acute respiratory failure with hypoxia (HCC)  Resolved Problems:    * No resolved hospital problems. *    Sepsis (HCC)        Likely secondary to multifocal pneumonia.  Lactic acid within normal limits.  Continue antibiotics.    Multifocal pneumonia        Likely cause of sepsis and respiratory failure.  See sepsis above.    Demand ischemia        In setting of sepsis and hypoxia.  Chest pain-free.  Overall low suspicion for ACS.  No clear clinical indication for therapeutic anticoagulation at this time.  Monitor on telemetry.  Low threshold to involve cardiology team pending clinical course.  Overall flat troponin trend.  Repeat levels in am.        SUNG (acute kidney injury) (HCC)        Mild.  Gentle IV fluids.  Monitor renal function for now. Monitor I/O.  Avoid unnecessary nephrotoxic medications based on risk/beneift analysis.    Acute respiratory failure with hypoxia (HCC)        Upon ambulation and increased activity.  Wean as tolerated. Treat PNA.                See above for problem focused plan.      All other systems appear either stable or near patient's baseline at time of admission. Continue to monitor clinical status closely overnight and adjust plan accordingly.  Continue home medications overnight as ordered for now and adjust medications as needed throughout hospital stay.  Any urgent/emergent consult for the night has been called by either myself or the ED team.  I  will be available overnight for any urgent needs until daytime hospitalist team assumes primary attending responsibility at 7 AM.    Reviewed and interpreted: Details related to the patient's presentation that were available at time of admission EMR including but not limited to prior notes, medications, imaging, lab results, and vital signs.    Ordered additional pertinent test as indicated on night of admission to reflect ongoing differential diagnosis, assessment, and plan as noted above and in the EMR.  Discussed pertinent details of case with ED team including physician/OPAL/nursing staff as available at time of admission request.          VTE PPx: Lovenox        Code Status: Full    Thorough discussion with patient regarding goals of care.  Patient competent to make own decisions.  No formal plan desired at this time.  Full code for now based on our discussion.    Chief Complaint, History of Present Illness, Review of Systems       Chief Complaint  sob/cough     History of Present Illness   Dawood Epps is a 68 y.o. male with a history including but not limited to hepatitis C, HTN, prior CVA, and recent influenza infection about 2 weeks ago who presented with persisting and worsening cough and shortness of breath.  No pleurisy or chest pain.  Endorses a productive cough.  No hemoptysis.  No syncope.  No fever.  No myalgias.  No nausea or vomiting.  Patient became hypoxic with minimal exertion and ambulation.      Review of Systems   Review of Systems   Constitutional:  Positive for fatigue. Negative for diaphoresis and fever.   Respiratory:  Positive for cough and shortness of breath.    Cardiovascular:  Negative for chest pain.   Gastrointestinal:  Negative for nausea and vomiting.   Musculoskeletal:  Negative for myalgias.   Neurological:  Negative for syncope.        10 point ROS performed and otherwise negative except as noted above and in HPI.    Past Medical, Surgical, Social, Family History:   Past Medical

## 2024-02-15 LAB
ANION GAP SERPL CALCULATED.3IONS-SCNC: 10 MMOL/L (ref 3–16)
BUN SERPL-MCNC: 18 MG/DL (ref 7–20)
CALCIUM SERPL-MCNC: 9.2 MG/DL (ref 8.3–10.6)
CHLORIDE SERPL-SCNC: 101 MMOL/L (ref 99–110)
CO2 SERPL-SCNC: 25 MMOL/L (ref 21–32)
CREAT SERPL-MCNC: 1 MG/DL (ref 0.8–1.3)
CRP SERPL-MCNC: 176.5 MG/L (ref 0–5.1)
DEPRECATED RDW RBC AUTO: 14.8 % (ref 12.4–15.4)
GFR SERPLBLD CREATININE-BSD FMLA CKD-EPI: >60 ML/MIN/{1.73_M2}
GLUCOSE SERPL-MCNC: 102 MG/DL (ref 70–99)
HCT VFR BLD AUTO: 36.8 % (ref 40.5–52.5)
HGB BLD-MCNC: 12.1 G/DL (ref 13.5–17.5)
MCH RBC QN AUTO: 28.7 PG (ref 26–34)
MCHC RBC AUTO-ENTMCNC: 32.8 G/DL (ref 31–36)
MCV RBC AUTO: 87.6 FL (ref 80–100)
PLATELET # BLD AUTO: 364 K/UL (ref 135–450)
PMV BLD AUTO: 9 FL (ref 5–10.5)
POTASSIUM SERPL-SCNC: 4.4 MMOL/L (ref 3.5–5.1)
PROCALCITONIN SERPL IA-MCNC: 0.09 NG/ML (ref 0–0.15)
RBC # BLD AUTO: 4.2 M/UL (ref 4.2–5.9)
SODIUM SERPL-SCNC: 136 MMOL/L (ref 136–145)
WBC # BLD AUTO: 11.8 K/UL (ref 4–11)

## 2024-02-15 PROCEDURE — 2500000003 HC RX 250 WO HCPCS: Performed by: FAMILY MEDICINE

## 2024-02-15 PROCEDURE — 6370000000 HC RX 637 (ALT 250 FOR IP): Performed by: INTERNAL MEDICINE

## 2024-02-15 PROCEDURE — 85027 COMPLETE CBC AUTOMATED: CPT

## 2024-02-15 PROCEDURE — 2580000003 HC RX 258: Performed by: FAMILY MEDICINE

## 2024-02-15 PROCEDURE — 36415 COLL VENOUS BLD VENIPUNCTURE: CPT

## 2024-02-15 PROCEDURE — 1200000000 HC SEMI PRIVATE

## 2024-02-15 PROCEDURE — 84145 PROCALCITONIN (PCT): CPT

## 2024-02-15 PROCEDURE — 6360000002 HC RX W HCPCS: Performed by: FAMILY MEDICINE

## 2024-02-15 PROCEDURE — 80048 BASIC METABOLIC PNL TOTAL CA: CPT

## 2024-02-15 PROCEDURE — 86140 C-REACTIVE PROTEIN: CPT

## 2024-02-15 RX ADMIN — DOXYCYCLINE 100 MG: 100 INJECTION, POWDER, LYOPHILIZED, FOR SOLUTION INTRAVENOUS at 10:46

## 2024-02-15 RX ADMIN — DOXYCYCLINE 100 MG: 100 INJECTION, POWDER, LYOPHILIZED, FOR SOLUTION INTRAVENOUS at 21:00

## 2024-02-15 RX ADMIN — ASPIRIN 81 MG: 81 TABLET, COATED ORAL at 09:39

## 2024-02-15 RX ADMIN — ATORVASTATIN CALCIUM 80 MG: 80 TABLET, FILM COATED ORAL at 21:00

## 2024-02-15 RX ADMIN — AMLODIPINE BESYLATE 5 MG: 5 TABLET ORAL at 09:39

## 2024-02-15 RX ADMIN — ENOXAPARIN SODIUM 40 MG: 100 INJECTION SUBCUTANEOUS at 09:40

## 2024-02-15 RX ADMIN — CETIRIZINE HYDROCHLORIDE 5 MG: 10 TABLET, FILM COATED ORAL at 09:39

## 2024-02-15 RX ADMIN — LISINOPRIL 40 MG: 20 TABLET ORAL at 21:01

## 2024-02-15 RX ADMIN — CEFTRIAXONE SODIUM 1000 MG: 1 INJECTION, POWDER, FOR SOLUTION INTRAMUSCULAR; INTRAVENOUS at 22:37

## 2024-02-15 NOTE — PROGRESS NOTES
4 Eyes Skin Assessment     NAME:  Dawood Epps  YOB: 1955  MEDICAL RECORD NUMBER:  [id number]    The patient is being assessed for  Admission    I agree that at least one RN has performed a thorough Head to Toe Skin Assessment on the patient. ALL assessment sites listed below have been assessed.      Areas assessed by both nurses:    Head, Face, Ears, Shoulders, Back, Chest, Arms, Elbows, Hands, Sacrum. Buttock, Coccyx, Ischium, Legs. Feet and Heels, and Under Medical Devices         Does the Patient have a Wound? Yes wound(s) were present on assessment. LDA wound assessment was Initiated and completed by RN Has a 1 cm round area of excoriation near anus.        Martinez Prevention initiated by RN: Yes  Wound Care Orders initiated by RN: No    Pressure Injury (Stage 3,4, Unstageable, DTI, NWPT, and Complex wounds) if present, place Wound referral order by RN under : No    New Ostomies, if present place, Ostomy referral order under : No     Nurse 1 eSignature: Electronically signed by Mariama Duffy RN on 2/14/24 at 8:42 PM EST    **SHARE this note so that the co-signing nurse can place an eSignature**    Nurse 2 eSignature: {Esignature:[id number]}

## 2024-02-15 NOTE — PLAN OF CARE
Monitor patient as he gets up for unsteadiness or SOB.  Assess 02 saturation when ambulating, apply o2 if needed.   Monitor for Hypertension and SOB.   Encourage patient to stop smoking.   Monitor wound near anus, apply moisture barrier .  Monitor and O and collect stool specimen due to diarrhea.

## 2024-02-15 NOTE — CARE COORDINATION
Chart reviewed day 2. Pt is followed by IM. IV abx for treatment of PNA. Also reports SOB upon exertion per IM note. Pt from home w/spouse prior. Pt denying any needs, states eh will follow up w/PCP. Will follow for needs as they arise. Electronically signed by Lien Live RN on 2/15/2024 at 1:36 PM

## 2024-02-15 NOTE — PROGRESS NOTES
V2.0    [facility] Progress Note      Name:  Dawood Epps /Age/Sex: 1955  (68 y.o. male)   MRN & CSN:  [id number] & [id number] Encounter Date/Time: 2/15/2024 5:16 PM EST   [facility]CP: Suha Ruff MD     Attending:En Hickman MD       Hospital Day: 3    Assessment and Recommendations   Dawood Epps is a 68 y.o. male with pmh of tobacco abuse increase sob cough congestion wheezing  who presents with Sepsis (HCC)    Hospital Problems             Last Modified POA    * (Principal) Sepsis (HCC) 2024 Yes    Tobacco abuse 2024 Yes    Multifocal pneumonia 2024 Yes    Demand ischemia 2024 Yes    SUNG (acute kidney injury) (HCC) 2024 Yes    Acute respiratory failure with hypoxia (HCC) 2024 Yes    COPD exacerbation ([facility]) 2024 Yes       Sepsis (HCC)         Likely secondary to multifocal pneumonia.  Lactic acid within normal limits.  Continue antibiotics.     Multifocal pneumonia         Likely cause of sepsis and respiratory failure.  See sepsis above.     Demand ischemia         In setting of sepsis and hypoxia.  Chest pain-free.  Overall low suspicion for ACS.  No clear clinical indication for therapeutic anticoagulation at this time.  Monitor on telemetry.  Low threshold to involve cardiology team pending clinical course.  Overall flat troponin trend.  Repeat levels in am.           SUNG (acute kidney injury) (HCC) resolved          Mild.  Gentle IV fluids.  Monitor renal function for now. Monitor I/O.  Avoid unnecessary nephrotoxic medications based on risk/beneift analysis.     Acute respiratory failure with hypoxia (HCC) resolved          Upon ambulation and increased activity.  Wean as tolerated. Treat PNA.              Diet ADULT DIET; Regular; 3 carb choices (45 gm/meal); Low Fat/Low Chol/High Fiber/PHILIP   DVT Prophylaxis [x] Lovenox, []  Heparin, [] SCDs, [] Ambulation,  [] Eliquis, [] Xarelto  [] Coumadin   Code Status Full Code   Disposition From: home    Recent Labs     02/13/24  1855 02/14/24  0702 02/15/24  0517    137 136   K 4.6 4.6 4.4   CL 98* 102 101   CO2 27 23 25   BUN 29* 25* 18   CREATININE 1.4* 1.2 1.0   GLUCOSE 113* 106* 102*     Hepatic:   Recent Labs     02/14/24  0702   AST 28   ALT 45*   BILITOT 0.6   ALKPHOS 81     Lipids:   Lab Results   Component Value Date/Time    CHOL 138 03/06/2023 12:00 AM    HDL 43 03/06/2023 12:00 AM    TRIG 85 03/06/2023 12:00 AM     Hemoglobin A1C: No results found for: \"LABA1C\"  TSH: No results found for: \"TSH\"  Troponin: No results found for: \"TROPONINT\"  Lactic Acid:   Recent Labs     02/13/24 2135   LACTA 1.6     BNP: No results for input(s): \"PROBNP\" in the last 72 hours.  UA:  Lab Results   Component Value Date/Time    NITRU Negative 06/24/2018 03:10 AM    COLORU Yellow 06/24/2018 03:10 AM    PHUR 6.5 06/24/2018 03:10 AM    CLARITYU Clear 06/24/2018 03:10 AM    SPECGRAV 1.020 06/24/2018 03:10 AM    LEUKOCYTESUR Negative 06/24/2018 03:10 AM    UROBILINOGEN 0.2 06/24/2018 03:10 AM    BILIRUBINUR Negative 06/24/2018 03:10 AM    BLOODU Negative 06/24/2018 03:10 AM    GLUCOSEU Negative 06/24/2018 03:10 AM    KETUA Negative 06/24/2018 03:10 AM     Urine Cultures: No results found for: \"LABURIN\"  Blood Cultures:   Lab Results   Component Value Date/Time    BC  02/13/2024 09:35 PM     No Growth to date.  Any change in status will be called.     Lab Results   Component Value Date/Time    BLOODCULT2  02/13/2024 09:39 PM     No Growth to date.  Any change in status will be called.     Organism: No results found for: \"ORG\"      Electronically signed by En Hickman MD on 2/15/2024 at 12:34 PM

## 2024-02-16 LAB
ALBUMIN SERPL-MCNC: 3 G/DL (ref 3.4–5)
ALBUMIN/GLOB SERPL: 0.7 {RATIO} (ref 1.1–2.2)
ALP SERPL-CCNC: 92 U/L (ref 40–129)
ALT SERPL-CCNC: 81 U/L (ref 10–40)
ANION GAP SERPL CALCULATED.3IONS-SCNC: 10 MMOL/L (ref 3–16)
AST SERPL-CCNC: 73 U/L (ref 15–37)
BASOPHILS # BLD: 0 K/UL (ref 0–0.2)
BASOPHILS NFR BLD: 0.3 %
BILIRUB SERPL-MCNC: 0.6 MG/DL (ref 0–1)
BUN SERPL-MCNC: 19 MG/DL (ref 7–20)
CALCIUM SERPL-MCNC: 9.3 MG/DL (ref 8.3–10.6)
CHLORIDE SERPL-SCNC: 100 MMOL/L (ref 99–110)
CO2 SERPL-SCNC: 24 MMOL/L (ref 21–32)
CREAT SERPL-MCNC: 1.1 MG/DL (ref 0.8–1.3)
DEPRECATED RDW RBC AUTO: 14.5 % (ref 12.4–15.4)
EOSINOPHIL # BLD: 0.1 K/UL (ref 0–0.6)
EOSINOPHIL NFR BLD: 1.3 %
FLUAV RNA RESP QL NAA+PROBE: NOT DETECTED
FLUBV RNA RESP QL NAA+PROBE: NOT DETECTED
GFR SERPLBLD CREATININE-BSD FMLA CKD-EPI: >60 ML/MIN/{1.73_M2}
GLUCOSE SERPL-MCNC: 96 MG/DL (ref 70–99)
HCT VFR BLD AUTO: 36.4 % (ref 40.5–52.5)
HGB BLD-MCNC: 12.1 G/DL (ref 13.5–17.5)
LYMPHOCYTES # BLD: 1.8 K/UL (ref 1–5.1)
LYMPHOCYTES NFR BLD: 15.7 %
MCH RBC QN AUTO: 28.8 PG (ref 26–34)
MCHC RBC AUTO-ENTMCNC: 33.2 G/DL (ref 31–36)
MCV RBC AUTO: 86.9 FL (ref 80–100)
MONOCYTES # BLD: 1.2 K/UL (ref 0–1.3)
MONOCYTES NFR BLD: 10.9 %
NEUTROPHILS # BLD: 8.1 K/UL (ref 1.7–7.7)
NEUTROPHILS NFR BLD: 71.8 %
PLATELET # BLD AUTO: 377 K/UL (ref 135–450)
PMV BLD AUTO: 9.1 FL (ref 5–10.5)
POTASSIUM SERPL-SCNC: 4.3 MMOL/L (ref 3.5–5.1)
PROT SERPL-MCNC: 7.2 G/DL (ref 6.4–8.2)
RBC # BLD AUTO: 4.19 M/UL (ref 4.2–5.9)
RSV AG NOSE QL: NEGATIVE
SARS-COV-2 RNA RESP QL NAA+PROBE: NOT DETECTED
SODIUM SERPL-SCNC: 134 MMOL/L (ref 136–145)
WBC # BLD AUTO: 11.3 K/UL (ref 4–11)

## 2024-02-16 PROCEDURE — 2580000003 HC RX 258: Performed by: FAMILY MEDICINE

## 2024-02-16 PROCEDURE — 2500000003 HC RX 250 WO HCPCS: Performed by: FAMILY MEDICINE

## 2024-02-16 PROCEDURE — 80053 COMPREHEN METABOLIC PANEL: CPT

## 2024-02-16 PROCEDURE — 6370000000 HC RX 637 (ALT 250 FOR IP): Performed by: INTERNAL MEDICINE

## 2024-02-16 PROCEDURE — 85025 COMPLETE CBC W/AUTO DIFF WBC: CPT

## 2024-02-16 PROCEDURE — 1200000000 HC SEMI PRIVATE

## 2024-02-16 PROCEDURE — 87807 RSV ASSAY W/OPTIC: CPT

## 2024-02-16 PROCEDURE — 87636 SARSCOV2 & INF A&B AMP PRB: CPT

## 2024-02-16 PROCEDURE — 97161 PT EVAL LOW COMPLEX 20 MIN: CPT

## 2024-02-16 PROCEDURE — 97116 GAIT TRAINING THERAPY: CPT

## 2024-02-16 PROCEDURE — 6360000002 HC RX W HCPCS: Performed by: FAMILY MEDICINE

## 2024-02-16 RX ADMIN — ENOXAPARIN SODIUM 40 MG: 100 INJECTION SUBCUTANEOUS at 10:10

## 2024-02-16 RX ADMIN — CETIRIZINE HYDROCHLORIDE 5 MG: 10 TABLET, FILM COATED ORAL at 10:10

## 2024-02-16 RX ADMIN — CEFTRIAXONE SODIUM 1000 MG: 1 INJECTION, POWDER, FOR SOLUTION INTRAMUSCULAR; INTRAVENOUS at 22:32

## 2024-02-16 RX ADMIN — DOXYCYCLINE 100 MG: 100 INJECTION, POWDER, LYOPHILIZED, FOR SOLUTION INTRAVENOUS at 21:02

## 2024-02-16 RX ADMIN — ATORVASTATIN CALCIUM 80 MG: 80 TABLET, FILM COATED ORAL at 21:01

## 2024-02-16 RX ADMIN — AMLODIPINE BESYLATE 5 MG: 5 TABLET ORAL at 10:10

## 2024-02-16 RX ADMIN — DOXYCYCLINE 100 MG: 100 INJECTION, POWDER, LYOPHILIZED, FOR SOLUTION INTRAVENOUS at 10:17

## 2024-02-16 RX ADMIN — LISINOPRIL 40 MG: 20 TABLET ORAL at 21:01

## 2024-02-16 RX ADMIN — Medication 10 ML: at 10:13

## 2024-02-16 RX ADMIN — ASPIRIN 81 MG: 81 TABLET, COATED ORAL at 10:10

## 2024-02-16 ASSESSMENT — PAIN DESCRIPTION - LOCATION: LOCATION: BACK

## 2024-02-16 ASSESSMENT — PAIN SCALES - GENERAL: PAINLEVEL_OUTOF10: 2

## 2024-02-16 NOTE — PROGRESS NOTES
(degrees)  RLE AROM: WFL  AROM LLE (degrees)  LLE AROM : WFL  Strength RLE  Strength RLE: WFL  Strength LLE  Strength LLE: WFL           Bed mobility  Supine to Sit: Modified independent (HOB elevated)  Transfers  Sit to Stand: Independent  Stand to Sit: Independent  Ambulation  Surface: Level tile  Device: No Device  Assistance: Independent  Quality of Gait: Pt ambulates with slow juan, no LOB. No rest breaks required during ambulation.  Gait Deviations: Slow Juan  Distance: 200 ft  Comments: SPO2 94% on RA and  bpm following               AM-PAC - Mobility    AM-PAC Basic Mobility - Inpatient   How much help is needed turning from your back to your side while in a flat bed without using bedrails?: None  How much help is needed moving from lying on your back to sitting on the side of a flat bed without using bedrails?: None  How much help is needed moving to and from a bed to a chair?: None  How much help is needed standing up from a chair using your arms?: None  How much help is needed walking in hospital room?: None  How much help is needed climbing 3-5 steps with a railing?: None  AM-PAC Inpatient Mobility Raw Score : 24  AM-PAC Inpatient T-Scale Score : 61.14  Mobility Inpatient CMS 0-100% Score: 0  Mobility Inpatient CMS G-Code Modifier : CH    Goals  Short Term Goals  Time Frame for Short Term Goals: 2/18/24  Short Term Goal 1: Pt will complete bed mobility mod I; goal met 2/16  Short Term Goal 2: Pt will complete transfers independently; goal met 2/16  Short Term Goal 3: Pt will ambulate 150 ft independently; goal met 2/16  Patient Goals   Patient Goals : \"Go home\"       Education  Patient Education  Education Given To: Patient  Education Provided: Role of Therapy;Precautions;Energy Conservation;Plan of Care;Transfer Training;Orientation  Education Provided Comments: role of PT, progressive mobility, endurance, vitals response with activity  Education Method: Verbal  Barriers to Learning:

## 2024-02-16 NOTE — PLAN OF CARE
Pt scoring pain on 0-10 scale. Pain medications given per MAR. Pt instructed to call out when pain level increasing. Call light within reach. Nurse will continue to reassess and monitor.

## 2024-02-16 NOTE — CARE COORDINATION
Chart reviewed day 3. Pt is followed by IM. Receiving IV abx for PNA. Anticipate d/c tomorrow. Pt IPTA. Denied any needs at this time. Reports he can drive to [facility] apmts. Will follow for needs as they arise. Electronically signed by Lien Live RN on 2/16/2024 at 2:28 PM

## 2024-02-16 NOTE — PROGRESS NOTES
Code   Disposition From: home   Expected Disposition: home   Estimated Date of Discharge: 2/16/2024  Patient requires continued admission due to aggressive management with antibiotics Oxygen Bronchodilator    Surrogate Decision Maker/ POA  Self      Personally reviewed Lab Studies and Imaging     Discussed management of the case with patient and family  who recommended NA     EKG interpreted personally and results NA     Imaging that was interpreted personally includes CXR and CT chest results  and results showed multisegmental bronchopneumonia     Drugs that require monitoring for toxicity include NA and the method of monitoring was NA        Subjective:     Chief Complaint: SOB cough wheezing     Dawood Epps is a 68 y.o. male who presents with cough fever wheezing and SOB       dyspnea upon exertion +  Associated with cough and wheezing. On room air.     Review of Systems:      Pertinent positives and negatives discussed in HPI    Objective:     Intake/Output Summary (Last 24 hours) at 2/16/2024 1115  Last data filed at 2/16/2024 0745  Gross per 24 hour   Intake 240 ml   Output 950 ml   Net -710 ml      Vitals:   Vitals:    02/15/24 2021 02/16/24 0145 02/16/24 0745 02/16/24 0931   BP:   120/75 118/70   Pulse:   89 96   Resp: 16 18 16    Temp: 99.5 °F (37.5 °C) 98.7 °F (37.1 °C) 97.8 °F (36.6 °C)    TempSrc: Oral Oral Temporal    SpO2:  92% 93% 92%   Weight:       Height:             Physical Exam:      General: NAD  Eyes: EOMI  ENT: neck supple  Cardiovascular: Regular rate.  Respiratory: hui rhonchi and extensive crackles   Gastrointestinal: Soft, non tender  Genitourinary: no suprapubic tenderness  Musculoskeletal: No edema  Skin: warm, dry  Neuro: Alert.  Psych: Mood appropriate.         Medications:   Medications:    sodium chloride flush  5-40 mL IntraVENous 2 times per day    enoxaparin  40 mg SubCUTAneous Daily    cefTRIAXone (ROCEPHIN) IV  1,000 mg IntraVENous Q24H    amLODIPine  5 mg Oral Daily    aspirin

## 2024-02-17 VITALS
HEART RATE: 102 BPM | WEIGHT: 158.95 LBS | OXYGEN SATURATION: 93 % | SYSTOLIC BLOOD PRESSURE: 126 MMHG | TEMPERATURE: 97.1 F | HEIGHT: 73 IN | RESPIRATION RATE: 22 BRPM | DIASTOLIC BLOOD PRESSURE: 73 MMHG | BODY MASS INDEX: 21.07 KG/M2

## 2024-02-17 LAB
ALBUMIN SERPL-MCNC: 3 G/DL (ref 3.4–5)
ALBUMIN/GLOB SERPL: 0.8 {RATIO} (ref 1.1–2.2)
ALP SERPL-CCNC: 90 U/L (ref 40–129)
ALT SERPL-CCNC: 109 U/L (ref 10–40)
ANION GAP SERPL CALCULATED.3IONS-SCNC: 9 MMOL/L (ref 3–16)
AST SERPL-CCNC: 91 U/L (ref 15–37)
BACTERIA BLD CULT ORG #2: NORMAL
BACTERIA BLD CULT: NORMAL
BASOPHILS # BLD: 0.1 K/UL (ref 0–0.2)
BASOPHILS NFR BLD: 0.6 %
BILIRUB SERPL-MCNC: 0.5 MG/DL (ref 0–1)
BUN SERPL-MCNC: 16 MG/DL (ref 7–20)
CALCIUM SERPL-MCNC: 9.6 MG/DL (ref 8.3–10.6)
CHLORIDE SERPL-SCNC: 101 MMOL/L (ref 99–110)
CO2 SERPL-SCNC: 25 MMOL/L (ref 21–32)
CREAT SERPL-MCNC: 1 MG/DL (ref 0.8–1.3)
DEPRECATED RDW RBC AUTO: 14.6 % (ref 12.4–15.4)
EOSINOPHIL # BLD: 0.2 K/UL (ref 0–0.6)
EOSINOPHIL NFR BLD: 1.5 %
GFR SERPLBLD CREATININE-BSD FMLA CKD-EPI: >60 ML/MIN/{1.73_M2}
GLUCOSE SERPL-MCNC: 100 MG/DL (ref 70–99)
HCT VFR BLD AUTO: 36.7 % (ref 40.5–52.5)
HGB BLD-MCNC: 12.3 G/DL (ref 13.5–17.5)
LYMPHOCYTES # BLD: 1.9 K/UL (ref 1–5.1)
LYMPHOCYTES NFR BLD: 19.1 %
MCH RBC QN AUTO: 29.2 PG (ref 26–34)
MCHC RBC AUTO-ENTMCNC: 33.5 G/DL (ref 31–36)
MCV RBC AUTO: 87.2 FL (ref 80–100)
MONOCYTES # BLD: 1.1 K/UL (ref 0–1.3)
MONOCYTES NFR BLD: 11.4 %
NEUTROPHILS # BLD: 6.8 K/UL (ref 1.7–7.7)
NEUTROPHILS NFR BLD: 67.4 %
PLATELET # BLD AUTO: 400 K/UL (ref 135–450)
PMV BLD AUTO: 9 FL (ref 5–10.5)
POTASSIUM SERPL-SCNC: 4.2 MMOL/L (ref 3.5–5.1)
PROT SERPL-MCNC: 7 G/DL (ref 6.4–8.2)
RBC # BLD AUTO: 4.21 M/UL (ref 4.2–5.9)
SODIUM SERPL-SCNC: 135 MMOL/L (ref 136–145)
WBC # BLD AUTO: 10 K/UL (ref 4–11)

## 2024-02-17 PROCEDURE — 2500000003 HC RX 250 WO HCPCS: Performed by: FAMILY MEDICINE

## 2024-02-17 PROCEDURE — 2580000003 HC RX 258: Performed by: FAMILY MEDICINE

## 2024-02-17 PROCEDURE — 85025 COMPLETE CBC W/AUTO DIFF WBC: CPT

## 2024-02-17 PROCEDURE — 80053 COMPREHEN METABOLIC PANEL: CPT

## 2024-02-17 PROCEDURE — 6360000002 HC RX W HCPCS: Performed by: FAMILY MEDICINE

## 2024-02-17 PROCEDURE — 6370000000 HC RX 637 (ALT 250 FOR IP): Performed by: INTERNAL MEDICINE

## 2024-02-17 RX ORDER — ACETAMINOPHEN 500 MG
500 TABLET ORAL ONCE
Qty: 1 TABLET | Refills: 0 | Status: SHIPPED | OUTPATIENT
Start: 2024-02-17 | End: 2024-02-17

## 2024-02-17 RX ORDER — LEVOFLOXACIN 500 MG/1
500 TABLET, FILM COATED ORAL DAILY
Qty: 5 TABLET | Refills: 0 | Status: SHIPPED | OUTPATIENT
Start: 2024-02-17 | End: 2024-02-22

## 2024-02-17 RX ADMIN — ENOXAPARIN SODIUM 40 MG: 100 INJECTION SUBCUTANEOUS at 08:43

## 2024-02-17 RX ADMIN — Medication 10 ML: at 08:43

## 2024-02-17 RX ADMIN — CETIRIZINE HYDROCHLORIDE 5 MG: 10 TABLET, FILM COATED ORAL at 08:43

## 2024-02-17 RX ADMIN — AMLODIPINE BESYLATE 5 MG: 5 TABLET ORAL at 08:43

## 2024-02-17 RX ADMIN — DOXYCYCLINE 100 MG: 100 INJECTION, POWDER, LYOPHILIZED, FOR SOLUTION INTRAVENOUS at 08:52

## 2024-02-17 RX ADMIN — SODIUM CHLORIDE 25 ML: 9 INJECTION, SOLUTION INTRAVENOUS at 08:50

## 2024-02-17 RX ADMIN — ASPIRIN 81 MG: 81 TABLET, COATED ORAL at 08:43

## 2024-02-17 ASSESSMENT — PAIN SCALES - GENERAL
PAINLEVEL_OUTOF10: 0
PAINLEVEL_OUTOF10: 0

## 2024-02-17 NOTE — PLAN OF CARE
Problem: Discharge Planning  Goal: Discharge to home or other facility with appropriate resources  2/17/2024 1549 by Edd Stallings RN  Outcome: Progressing  2/17/2024 1029 by Edd Stallings RN  Outcome: Progressing     Problem: Safety - Adult  Goal: Free from fall injury  2/17/2024 1549 by Edd Stallings RN  Outcome: Progressing  2/17/2024 1029 by Edd Stallings RN  Outcome: Progressing     Problem: Pain  Goal: Verbalizes/displays adequate comfort level or baseline comfort level  2/17/2024 1549 by Edd Stallings RN  Outcome: Progressing  2/17/2024 1029 by Edd Stallings RN  Outcome: Progressing     Problem: Chronic Conditions and Co-morbidities  Goal: Patient's chronic conditions and co-morbidity symptoms are monitored and maintained or improved  2/17/2024 1549 by Edd Stallings RN  Outcome: Progressing  2/17/2024 1029 by Edd Stallings RN  Outcome: Progressing

## 2024-02-17 NOTE — PROGRESS NOTES
Patient and family given discharge instructions. All questions and concerns were addressed. Patient IV removed and dressing placed. Patient wheeled to car with all patient belongings and AVS.

## 2024-02-17 NOTE — DISCHARGE SUMMARY
The lungs are mildly hyperinflated and emphysematous with bullous emphysematous changes along the upper lobes extending inferiorly. There are some hazy reticular nodular and ground-glass infiltrates scattered along the right lung base posterolaterally with mild bronchiectasis posteriorly extending into the right middle lobe anteriorly.  No endobronchial lesion is seen.  There are some minimal reticulonodular infiltrates in the lingula inferiorly and left lower lobe posteriorly.  There is mild bronchiectasis along the lingula inferiorly. Upper Abdomen: There are mild wedge compression deformities scattered along the mid to lower thoracic region with no displaced or retropulsed fragments Soft Tissues/Bones: No acute bone or soft tissue abnormality.     No evidence of a pulmonary embolus Mildly dilated and moderately atherosclerotic thoracic aorta with no aneurysm or dissection Moderate coronary artery calcifications Small lymph nodes scattered in the mediastinum which are not pathologic by size criteria suggest follow-up to assure stability Bullous emphysematous changes of the lungs with hazy reticulonodular and ground-glass infiltrates scattered along the right lower lobe and less prominent reticulonodular opacities scattered on the left upper lobe extending into the lingula and left lung base posteriorly which is suspicious for early multisegmental bronchopneumonia.  Recommend follow-up until complete resolution.     XR CHEST (2 VW)    Result Date: 2/13/2024  EXAMINATION: TWO XRAY VIEWS OF THE CHEST 2/13/2024 5:40 pm COMPARISON: None. HISTORY: ORDERING SYSTEM PROVIDED HISTORY: shortness of breath TECHNOLOGIST PROVIDED HISTORY: Reason for exam:->shortness of breath Reason for Exam: SOB and coughing for 2 weeks FINDINGS: Heart size is normal  Aorta is normal.  Lungs are normally expanded and clear. No pleural effusions. Mild spondylosis     Lungs are clear       CBC:   Recent Labs     02/15/24  0517 02/16/24  0611  02/17/24  0602   WBC 11.8* 11.3* 10.0   HGB 12.1* 12.1* 12.3*    377 400     BMP:    Recent Labs     02/15/24  0517 02/16/24  0611 02/17/24  0602    134* 135*   K 4.4 4.3 4.2    100 101   CO2 25 24 25   BUN 18 19 16   CREATININE 1.0 1.1 1.0   GLUCOSE 102* 96 100*     Hepatic:   Recent Labs     02/16/24  0611 02/17/24  0602   AST 73* 91*   ALT 81* 109*   BILITOT 0.6 0.5   ALKPHOS 92 90     Lipids:   Lab Results   Component Value Date/Time    CHOL 138 03/06/2023 12:00 AM    HDL 43 03/06/2023 12:00 AM    TRIG 85 03/06/2023 12:00 AM     Hemoglobin A1C: No results found for: \"LABA1C\"  TSH: No results found for: \"TSH\"  Troponin: No results found for: \"TROPONINT\"  Lactic Acid: No results for input(s): \"LACTA\" in the last 72 hours.  BNP: No results for input(s): \"PROBNP\" in the last 72 hours.  UA:  Lab Results   Component Value Date/Time    NITRU Negative 06/24/2018 03:10 AM    COLORU Yellow 06/24/2018 03:10 AM    PHUR 6.5 06/24/2018 03:10 AM    CLARITYU Clear 06/24/2018 03:10 AM    SPECGRAV 1.020 06/24/2018 03:10 AM    LEUKOCYTESUR Negative 06/24/2018 03:10 AM    UROBILINOGEN 0.2 06/24/2018 03:10 AM    BILIRUBINUR Negative 06/24/2018 03:10 AM    BLOODU Negative 06/24/2018 03:10 AM    GLUCOSEU Negative 06/24/2018 03:10 AM    KETUA Negative 06/24/2018 03:10 AM     Urine Cultures: No results found for: \"LABURIN\"  Blood Cultures:   Lab Results   Component Value Date/Time    BC  02/13/2024 09:35 PM     No Growth to date.  Any change in status will be called.     Lab Results   Component Value Date/Time    BLOODCULT2  02/13/2024 09:39 PM     No Growth to date.  Any change in status will be called.     Organism: No results found for: \"ORG\"    Time Spent Discharging patient 30 minutes    Electronically signed by En Hickman MD on 2/17/2024 at 12:49 PM

## 2024-02-20 ENCOUNTER — TELEPHONE (OUTPATIENT)
Dept: INTERNAL MEDICINE CLINIC | Age: 69
End: 2024-02-20

## 2024-02-22 NOTE — TELEPHONE ENCOUNTER
Care Transitions Initial Follow Up Call    Outreach made within 2 business days of discharge: Yes    Patient: Dawood Epps Patient : 1955   MRN: [id number]  Reason for Admission: There are no discharge diagnoses documented for the most recent discharge.  Discharge Date: 24       Spoke with: Dawood Epps     Discharge department/facility: [facility] Interactive Patient Contact:  Was patient able to fill all prescriptions: Yes  Was patient instructed to bring all medications to the follow-up visit: Yes  Is patient taking all medications as directed in the discharge summary? Yes  Does patient understand their discharge instructions: Yes  Does patient have questions or concerns that need addressed prior to 7-14 day follow up office visit: no    Scheduled appointment with PCP within 7-14 days    Follow Up  Future Appointments   Date Time Provider Department Center   2024 11:10 AM Suha Ruff MD [facility]   2024 10:10 AM Suha Ruff MD [facility] Li Roblero MA

## 2024-02-26 ENCOUNTER — TELEPHONE (OUTPATIENT)
Dept: CASE MANAGEMENT | Age: 69
End: 2024-02-26

## 2024-02-26 ENCOUNTER — OFFICE VISIT (OUTPATIENT)
Dept: INTERNAL MEDICINE CLINIC | Age: 69
End: 2024-02-26
Payer: COMMERCIAL

## 2024-02-26 VITALS
BODY MASS INDEX: 21.47 KG/M2 | OXYGEN SATURATION: 95 % | WEIGHT: 162 LBS | HEART RATE: 85 BPM | SYSTOLIC BLOOD PRESSURE: 110 MMHG | DIASTOLIC BLOOD PRESSURE: 62 MMHG | HEIGHT: 73 IN

## 2024-02-26 DIAGNOSIS — J18.9 MULTIFOCAL PNEUMONIA: Primary | ICD-10-CM

## 2024-02-26 DIAGNOSIS — J43.9 BULLOUS EMPHYSEMA (HCC): ICD-10-CM

## 2024-02-26 DIAGNOSIS — R79.89 ELEVATED LFTS: ICD-10-CM

## 2024-02-26 PROCEDURE — 1123F ACP DISCUSS/DSCN MKR DOCD: CPT | Performed by: INTERNAL MEDICINE

## 2024-02-26 PROCEDURE — 3074F SYST BP LT 130 MM HG: CPT | Performed by: INTERNAL MEDICINE

## 2024-02-26 PROCEDURE — 3078F DIAST BP <80 MM HG: CPT | Performed by: INTERNAL MEDICINE

## 2024-02-26 PROCEDURE — 99214 OFFICE O/P EST MOD 30 MIN: CPT | Performed by: INTERNAL MEDICINE

## 2024-02-26 RX ORDER — FLUTICASONE FUROATE, UMECLIDINIUM BROMIDE AND VILANTEROL TRIFENATATE 200; 62.5; 25 UG/1; UG/1; UG/1
1 POWDER RESPIRATORY (INHALATION) DAILY
Qty: 1 EACH | Refills: 0 | Status: SHIPPED | COMMUNITY
Start: 2024-02-26

## 2024-02-26 RX ORDER — FLUTICASONE FUROATE, UMECLIDINIUM BROMIDE AND VILANTEROL TRIFENATATE 200; 62.5; 25 UG/1; UG/1; UG/1
1 POWDER RESPIRATORY (INHALATION) DAILY
Qty: 1 EACH | Refills: 0 | Status: SHIPPED | OUTPATIENT
Start: 2024-02-26

## 2024-02-26 NOTE — TELEPHONE ENCOUNTER
CT Chest 2/13/24 with f/u imaging recommendations    Bullous emphysematous changes of the lungs with hazy reticulonodular and ground-glass infiltrates scattered along the right lower lobe and less  prominent reticulonodular opacities scattered on the left upper lobe  extending into the lingula and left lung base posteriorly which is suspicious  for early multisegmental bronchopneumonia.  Recommend follow-up until  complete resolution.    Amy Duke  Lung Navigation R.TLeanne([email or web address])  [email or web address]

## 2024-02-26 NOTE — PROGRESS NOTES
Dawood Epps  YOB: 1955    Date of Service:  2/26/2024    Chief Complaint:      Chief Complaint   Patient presents with    Follow-Up from Hospital     D/c 02/17/2024 Pneumonia       Abnormal Test Results     Discuss CT scan.     emphysema       Assessment/Plan:  Dawood was seen today for follow-up from hospital, abnormal test results and emphysema.    Diagnoses and all orders for this visit:    Multifocal pneumonia  -     [facility]    Bullous emphysema (HCC)  Start fluticasone-umeclidin-vilant (TRELEGY ELLIPTA) 200-62.5-25 MCG/ACT AEPB inhaler; Inhale 1 puff into the lungs daily  -     [facility]  Sample of fluticasone-umeclidin-vilant (TRELEGY ELLIPTA) 200-62.5-25 MCG/ACT AEPB inhaler; Inhale 1 puff into the lungs daily and instructions given    Elevated LFTs  Will repeat with next physical labs in 1 month    Return Keep Fasting PE 4/8.      HPI:  Dawood Epps is a 68 y.o.    He was hospitalized 2/13/2024 for multifocal pneumonia and diagnose with bullous emphysema.  He was discharge with albuterol.  He's still coughing and having dyspnea on exertion.  He works as a  and doesn't think he could go back to work full time since there's no part time available.  He was given a note from the hospital to return to work on 3/11/24 but he doesn't think he'll be able to given his current condition and thinking about retiring if still feel that way.  He has stop smoking.      No results found for: \"LABA1C\"  Lab Results   Component Value Date     (L) 02/17/2024    K 4.2 02/17/2024     02/17/2024    CO2 25 02/17/2024    BUN 16 02/17/2024    CREATININE 1.0 02/17/2024    GLUCOSE 100 (H) 02/17/2024    CALCIUM 9.6 02/17/2024     Lab Results   Component Value Date/Time    CHOL 138 03/06/2023 12:00 AM    TRIG 85 03/06/2023 12:00 AM    HDL 43 03/06/2023 12:00 AM    LDLCALC 78 03/06/2023 12:00 AM     Lab Results   Component Value Date     (H) 02/17/2024    AST 91

## 2024-02-27 NOTE — PROGRESS NOTES
PULMONARY CLINIC NOTE      Dawood Epps   : 1955  MRN: [id number]     Date of Service: 3/1/2024    PCP: Suha Ruff MD    Referring provider: Suha Ruff MD      Chief Complaint   Patient presents with    New Patient     Patient has been referred for COPD. Recently had pneumonia for the first time.           ASSESSMENT & PLAN       68 y.o. pleasant  male patient with:    1. COPD exacerbation (HCC)    2. Multifocal pneumonia    3. Tobacco abuse    4. HTN (hypertension), benign    5. Dyslipidemia         # Hospital discharge follow-up  Admission with acute hypoxic respiratory failure, multifocal pneumonia in 2024  # Multifocal bronchopneumonia.  Aspiration element not excluded.  # COPD/bullous emphysema  Will give another course of steroid to help with his persistent wheezing after his recent AECOPD/multifocal pneumonia  Continue incentive spirometry and a gradual exercise program at home  No interest in pulmonary rehab referral currently  Continue Trelegy Ellipta/wash mouth after use  Continue albuterol as needed and before anticipated exertion  Inhaler education provided  PFTs before next visit  Low-dose CT scan of the chest in 2 months to follow-up on lung infiltrates/pneumonia  Follow-up in 2 months 1 week after the CAT scan    # Metabolic syndrome: HTN, HLD  Body mass index is 21.93 kg/m².  Targeting healthy weight is encouraged. Healthy weight can help treat sleep apnea, decrease breathing difficulties and respiratory illness exacerbations.    # History of CVA, demand ischemia,    # Nicotine dependence   50 PY. Quit date: 2024  LDCT: Indicated     # Vaccines: Reviewed, up-to-date with influenza, RSV, pneumonia and COVID    Other medical Hx: has a past medical history of Aphasia (2017), Hepatitis C, History of hepatitis C (2017), Hyperlipidemia, Hypertension, and Stroke (HCC) (2019).        Health Maintenance/Preventive measures:        >>  Avoid exposure to tobacco,

## 2024-03-01 ENCOUNTER — OFFICE VISIT (OUTPATIENT)
Dept: PULMONOLOGY | Age: 69
End: 2024-03-01

## 2024-03-01 VITALS
RESPIRATION RATE: 16 BRPM | SYSTOLIC BLOOD PRESSURE: 139 MMHG | DIASTOLIC BLOOD PRESSURE: 84 MMHG | TEMPERATURE: 97.7 F | BODY MASS INDEX: 22.03 KG/M2 | WEIGHT: 166.2 LBS | OXYGEN SATURATION: 97 % | HEIGHT: 73 IN | HEART RATE: 75 BPM

## 2024-03-01 DIAGNOSIS — J44.1 COPD EXACERBATION (HCC): Primary | ICD-10-CM

## 2024-03-01 DIAGNOSIS — J18.9 MULTIFOCAL PNEUMONIA: ICD-10-CM

## 2024-03-01 DIAGNOSIS — Z72.0 TOBACCO ABUSE: ICD-10-CM

## 2024-03-01 DIAGNOSIS — E78.5 DYSLIPIDEMIA: ICD-10-CM

## 2024-03-01 DIAGNOSIS — I10 HTN (HYPERTENSION), BENIGN: ICD-10-CM

## 2024-03-01 RX ORDER — PREDNISONE 20 MG/1
TABLET ORAL
Qty: 10 TABLET | Refills: 0 | Status: SHIPPED | OUTPATIENT
Start: 2024-03-01

## 2024-03-01 NOTE — PATIENT INSTRUCTIONS
to be rescheduled if they are designated to work during your appointment time.      You may receive a survey regarding the care you received during your visit.  Your input is valuable to us.  We encourage you to complete and return your survey.  We hope you will choose us in the future for your healthcare needs.     Pt instructed of all future appointment dates & times, including radiology, labs, procedures & referrals. If procedures were scheduled preparation instructions provided. Instructions on future appointments with [facility] were given.

## 2024-03-01 NOTE — PROGRESS NOTES
MA Communication:  The following orders are received by verbal communication from Pooja Ma MD    Orders include:    PFT  CT  Follow up in 2 months

## 2024-03-07 ENCOUNTER — TELEPHONE (OUTPATIENT)
Dept: PULMONOLOGY | Age: 69
End: 2024-03-07

## 2024-03-07 NOTE — TELEPHONE ENCOUNTER
Pt's spouse, Anika, called to ask if Dr Ma can excuse pt from work for one more week. If yes, Dr Ma should send a letter (on letterhead) to Amy Malin at fax number [phone] stating patient can return to work on 3/18/24.    Anika reports pt finished steroids yesterday but still feels like there is \"stuff\" in his lungs.     Please call Anika to confirm when/whether work excuse has been sent. [phone].

## 2024-03-08 NOTE — TELEPHONE ENCOUNTER
Patient wife call stating they need a respond by today (3/8/24) because her  is expecting to go back to work Monday (3/11/24).    Please sign pend letter if appropriate.      Patient want a call back.

## 2024-03-27 DIAGNOSIS — I10 HTN (HYPERTENSION), BENIGN: ICD-10-CM

## 2024-03-27 DIAGNOSIS — J43.9 BULLOUS EMPHYSEMA (HCC): ICD-10-CM

## 2024-03-27 RX ORDER — AMLODIPINE BESYLATE 5 MG/1
5 TABLET ORAL DAILY
Qty: 90 TABLET | Refills: 0 | Status: SHIPPED | OUTPATIENT
Start: 2024-03-27

## 2024-03-27 RX ORDER — FLUTICASONE FUROATE, UMECLIDINIUM BROMIDE AND VILANTEROL TRIFENATATE 200; 62.5; 25 UG/1; UG/1; UG/1
1 POWDER RESPIRATORY (INHALATION) DAILY
Qty: 60 EACH | Refills: 0 | Status: SHIPPED | OUTPATIENT
Start: 2024-03-27

## 2024-04-04 DIAGNOSIS — Z91.09 ENVIRONMENTAL ALLERGIES: ICD-10-CM

## 2024-04-04 DIAGNOSIS — I10 HTN (HYPERTENSION), BENIGN: ICD-10-CM

## 2024-04-04 RX ORDER — FLUTICASONE PROPIONATE 50 MCG
2 SPRAY, SUSPENSION (ML) NASAL DAILY
Qty: 16 G | OUTPATIENT
Start: 2024-04-04

## 2024-04-04 RX ORDER — LISINOPRIL 40 MG/1
40 TABLET ORAL DAILY
Qty: 90 TABLET | Refills: 1 | OUTPATIENT
Start: 2024-04-04

## 2024-04-08 ENCOUNTER — OFFICE VISIT (OUTPATIENT)
Dept: INTERNAL MEDICINE CLINIC | Age: 69
End: 2024-04-08
Payer: COMMERCIAL

## 2024-04-08 VITALS
OXYGEN SATURATION: 98 % | HEART RATE: 78 BPM | SYSTOLIC BLOOD PRESSURE: 148 MMHG | WEIGHT: 178 LBS | DIASTOLIC BLOOD PRESSURE: 70 MMHG | HEIGHT: 73 IN | BODY MASS INDEX: 23.59 KG/M2

## 2024-04-08 DIAGNOSIS — Z00.00 ANNUAL PHYSICAL EXAM: Primary | ICD-10-CM

## 2024-04-08 DIAGNOSIS — R79.89 ELEVATED LFTS: ICD-10-CM

## 2024-04-08 DIAGNOSIS — E78.5 DYSLIPIDEMIA: ICD-10-CM

## 2024-04-08 DIAGNOSIS — Z91.09 ENVIRONMENTAL ALLERGIES: ICD-10-CM

## 2024-04-08 DIAGNOSIS — J43.9 BULLOUS EMPHYSEMA (HCC): ICD-10-CM

## 2024-04-08 DIAGNOSIS — I10 HTN (HYPERTENSION), BENIGN: ICD-10-CM

## 2024-04-08 PROBLEM — J18.9 MULTIFOCAL PNEUMONIA: Status: RESOLVED | Noted: 2024-02-13 | Resolved: 2024-04-08

## 2024-04-08 PROBLEM — J44.1 COPD EXACERBATION (HCC): Status: RESOLVED | Noted: 2024-02-14 | Resolved: 2024-04-08

## 2024-04-08 PROBLEM — N17.9 AKI (ACUTE KIDNEY INJURY) (HCC): Status: RESOLVED | Noted: 2024-02-13 | Resolved: 2024-04-08

## 2024-04-08 PROBLEM — J96.01 ACUTE RESPIRATORY FAILURE WITH HYPOXIA (HCC): Status: RESOLVED | Noted: 2024-02-14 | Resolved: 2024-04-08

## 2024-04-08 PROBLEM — A41.9 SEPSIS (HCC): Status: RESOLVED | Noted: 2024-02-13 | Resolved: 2024-04-08

## 2024-04-08 PROCEDURE — 3078F DIAST BP <80 MM HG: CPT | Performed by: INTERNAL MEDICINE

## 2024-04-08 PROCEDURE — 99397 PER PM REEVAL EST PAT 65+ YR: CPT | Performed by: INTERNAL MEDICINE

## 2024-04-08 PROCEDURE — 3077F SYST BP >= 140 MM HG: CPT | Performed by: INTERNAL MEDICINE

## 2024-04-08 PROCEDURE — 36415 COLL VENOUS BLD VENIPUNCTURE: CPT | Performed by: INTERNAL MEDICINE

## 2024-04-08 RX ORDER — AMLODIPINE BESYLATE 5 MG/1
5 TABLET ORAL DAILY
Qty: 30 TABLET | Refills: 5 | Status: SHIPPED | OUTPATIENT
Start: 2024-04-08

## 2024-04-08 RX ORDER — LISINOPRIL 40 MG/1
40 TABLET ORAL NIGHTLY
Qty: 30 TABLET | Refills: 11 | Status: SHIPPED | OUTPATIENT
Start: 2024-04-08

## 2024-04-08 RX ORDER — FLUTICASONE FUROATE, UMECLIDINIUM BROMIDE AND VILANTEROL TRIFENATATE 200; 62.5; 25 UG/1; UG/1; UG/1
1 POWDER RESPIRATORY (INHALATION) DAILY
Qty: 1 EACH | Refills: 11 | Status: SHIPPED | OUTPATIENT
Start: 2024-04-08

## 2024-04-08 RX ORDER — FLUTICASONE PROPIONATE 50 MCG
2 SPRAY, SUSPENSION (ML) NASAL DAILY
Qty: 1 EACH | Refills: 11 | Status: SHIPPED | OUTPATIENT
Start: 2024-04-08

## 2024-04-08 ASSESSMENT — PATIENT HEALTH QUESTIONNAIRE - PHQ9
SUM OF ALL RESPONSES TO PHQ QUESTIONS 1-9: 0
1. LITTLE INTEREST OR PLEASURE IN DOING THINGS: NOT AT ALL
2. FEELING DOWN, DEPRESSED OR HOPELESS: NOT AT ALL
SUM OF ALL RESPONSES TO PHQ9 QUESTIONS 1 & 2: 0
SUM OF ALL RESPONSES TO PHQ QUESTIONS 1-9: 0
SUM OF ALL RESPONSES TO PHQ QUESTIONS 1-9: 0
1. LITTLE INTEREST OR PLEASURE IN DOING THINGS: NOT AT ALL
SUM OF ALL RESPONSES TO PHQ QUESTIONS 1-9: 0
SUM OF ALL RESPONSES TO PHQ9 QUESTIONS 1 & 2: 0
2. FEELING DOWN, DEPRESSED OR HOPELESS: NOT AT ALL

## 2024-04-08 NOTE — PROGRESS NOTES
PUFF INTO THE LUNGS DAILY 60 each 0    amLODIPine (NORVASC) 5 MG tablet TAKE 1 TABLET BY MOUTH DAILY 90 tablet 0    atorvastatin (LIPITOR) 80 MG tablet Take 1 tablet by mouth nightly 90 tablet 1    albuterol sulfate HFA (VENTOLIN HFA) 108 (90 Base) MCG/ACT inhaler Inhale 2 puffs into the lungs 4 times daily as needed for Wheezing 18 g 0    lisinopril (PRINIVIL;ZESTRIL) 40 MG tablet Take 1 tablet by mouth daily (Patient taking differently: Take 1 tablet by mouth at bedtime) 90 tablet 1    aspirin 81 MG EC tablet Take 1 tablet by mouth daily 90 tablet 2    fluticasone (FLONASE) 50 MCG/ACT nasal spray 2 sprays by Each Nostril route daily 1 each 11    cetirizine (ZYRTEC) 10 MG tablet Take 1 tablet by mouth at bedtime 30 tablet 11       Past Medical History:   Diagnosis Date    Aphasia 12/26/2017    Hepatitis C     History of hepatitis C 12/26/2017    Treated with pills in the past    Hyperlipidemia     Hypertension     Stroke (HCC) 2019     Past Surgical History:   Procedure Laterality Date    ABDOMINAL AORTIC ANEURYSM REPAIR  2020    CAROTID ARTERY SURGERY Bilateral 2018    HERNIA REPAIR Bilateral 2017    bilateral inguinal hernia repair    TONSILLECTOMY AND ADENOIDECTOMY       Family History   Problem Relation Age of Onset    High Cholesterol Mother     High Blood Pressure Mother     Heart Disease Mother 60        cabg    Colon Cancer Father     Heart Disease Father 60        CABG    Cancer Father         bladder and colon    Heart Disease Sister 60        CABG    Stroke Sister     Heart Attack Brother 35    Heart Attack Maternal Grandfather 50     Social History     Socioeconomic History    Marital status:      Spouse name: Not on file    Number of children: Not on file    Years of education: Not on file    Highest education level: Not on file   Occupational History    Not on file   Tobacco Use    Smoking status: Former     Current packs/day: 0.00     Average packs/day: 1 pack/day for 50.0 years (50.0 ttl

## 2024-04-09 LAB
ALBUMIN SERPL-MCNC: 4.9 G/DL (ref 3.4–5)
ALP SERPL-CCNC: 83 U/L (ref 40–129)
ALT SERPL-CCNC: 43 U/L (ref 10–40)
AST SERPL-CCNC: 35 U/L (ref 15–37)
BILIRUB DIRECT SERPL-MCNC: <0.2 MG/DL (ref 0–0.3)
BILIRUB INDIRECT SERPL-MCNC: ABNORMAL MG/DL (ref 0–1)
BILIRUB SERPL-MCNC: 0.6 MG/DL (ref 0–1)
CHOLEST SERPL-MCNC: 142 MG/DL (ref 0–199)
HDLC SERPL-MCNC: 46 MG/DL (ref 40–60)
LDLC SERPL CALC-MCNC: 79 MG/DL
PROT SERPL-MCNC: 7.4 G/DL (ref 6.4–8.2)
TRIGL SERPL-MCNC: 87 MG/DL (ref 0–150)
VLDLC SERPL CALC-MCNC: 17 MG/DL

## 2024-04-16 DIAGNOSIS — Z91.09 ENVIRONMENTAL ALLERGIES: ICD-10-CM

## 2024-04-16 DIAGNOSIS — I63.9 CEREBROVASCULAR ACCIDENT (CVA), UNSPECIFIED MECHANISM (HCC): ICD-10-CM

## 2024-04-16 RX ORDER — CETIRIZINE HYDROCHLORIDE 10 MG/1
10 TABLET ORAL NIGHTLY
Qty: 30 TABLET | Refills: 11 | Status: SHIPPED | OUTPATIENT
Start: 2024-04-16

## 2024-04-16 RX ORDER — ASPIRIN 81 MG/1
81 TABLET, COATED ORAL DAILY
Qty: 90 TABLET | Refills: 3 | Status: SHIPPED | OUTPATIENT
Start: 2024-04-16

## 2024-04-16 NOTE — TELEPHONE ENCOUNTER
LAST REFILL 04/17/2023  AMOUNT 30     11 REFILLS  LAST VISIT 04/08/2024  NEXT VISIT 10/07/2024

## 2024-04-16 NOTE — PROGRESS NOTES
PULMONARY CLINIC NOTE      Dawood Epps   : 1955  MRN: [id number]     Date of Service: 2024    PCP: Suha Ruff MD    Referring provider: No ref. provider found      Chief Complaint   Patient presents with    Results     PFT/CT results           ASSESSMENT & PLAN       68 y.o. pleasant  male patient with:    No diagnosis found.       # COPD/bullous emphysema  PFTs May 1, 2024: Severe obstruction with air trapping and moderate gas diffusion defect.  Reviewed the findings of the CT scan as well as the PFTs with the patient and explained results.  Continue Trelegy Ellipta 200 mcg with albuterol as needed  No interest in nebulizer or pulmonary rehab currently  Inhaler education provided  Follow-up clinic visit in 6 months    # Metabolic syndrome: HTN, HLD  Body mass index is 23.75 kg/m².  Targeting healthy weight is encouraged. Healthy weight can help treat sleep apnea, decrease breathing difficulties and respiratory illness exacerbations.    # History of CVA, demand ischemia,    # Nicotine dependence   50 PY. Quit date: 2024.  Relapsed in 2024.  Smoking cessation counseling provided.  No interest in nicotine replacement, Chantix or Wellbutrin currently. 3 minutes  Low-dose CT scan May 2024: No concerning nodules.  Follow-up low-dose CT scan in 12 months    # Vaccines: Reviewed, up-to-date with influenza, RSV, pneumonia and CO      Health Maintenance/Preventive measures:        >>  Avoid exposure to tobacco, irritants, allergens as possible as well as contact with patients with infectious respiratory illness.        >>  Stay up-to-date with influenza, pneumonia, RSV, & COVID-19 vaccines as recommended by the Advisory Committee on Immunization Practices (ACIP)        >>  Healthy diet and activity as able.        >>  Acid reflux precautions: Head of bed elevation, avoiding tight clothes, avoiding big meals or snacking 3 hours before bedtime, targeting healthy weight.        >>  Practice sleep

## 2024-04-29 DIAGNOSIS — J43.9 BULLOUS EMPHYSEMA (HCC): ICD-10-CM

## 2024-04-29 RX ORDER — FLUTICASONE FUROATE, UMECLIDINIUM BROMIDE AND VILANTEROL TRIFENATATE 200; 62.5; 25 UG/1; UG/1; UG/1
1 POWDER RESPIRATORY (INHALATION) DAILY
Qty: 60 EACH | OUTPATIENT
Start: 2024-04-29

## 2024-05-01 ENCOUNTER — HOSPITAL ENCOUNTER (OUTPATIENT)
Dept: PULMONOLOGY | Age: 69
Discharge: HOME | End: 2024-05-01
Payer: COMMERCIAL

## 2024-05-01 ENCOUNTER — HOSPITAL ENCOUNTER (OUTPATIENT)
Dept: CT IMAGING | Age: 69
Discharge: HOME | End: 2024-05-01
Payer: COMMERCIAL

## 2024-05-01 VITALS — HEART RATE: 86 BPM | RESPIRATION RATE: 17 BRPM | OXYGEN SATURATION: 96 %

## 2024-05-01 DIAGNOSIS — J18.9 MULTIFOCAL PNEUMONIA: ICD-10-CM

## 2024-05-01 DIAGNOSIS — J44.1 COPD EXACERBATION (HCC): ICD-10-CM

## 2024-05-01 LAB
DLCO %PRED: 66 %
DLCO PRED: NORMAL
DLCO/VA %PRED: NORMAL
DLCO/VA PRED: NORMAL
DLCO/VA: NORMAL
DLCO: NORMAL
EXPIRATORY TIME-POST: NORMAL
EXPIRATORY TIME: NORMAL
FEF 25-75 %CHNG: NORMAL
FEF 25-75 POST %PRED: NORMAL
FEF 25-75% %PRED-PRE: NORMAL
FEF 25-75% PRED: NORMAL
FEF 25-75-POST: NORMAL
FEF 25-75-PRE: NORMAL
FEV1 %PRED-POST: 47 %
FEV1 %PRED-PRE: 43 %
FEV1 PRED: NORMAL
FEV1-POST: NORMAL
FEV1-PRE: NORMAL
FEV1/FVC %PRED-POST: 50 %
FEV1/FVC %PRED-PRE: 45 %
FEV1/FVC PRED: NORMAL
FEV1/FVC-POST: NORMAL
FEV1/FVC-PRE: NORMAL
FVC %PRED-POST: 92 L
FVC %PRED-PRE: 95 %
FVC PRED: NORMAL
FVC-POST: NORMAL
FVC-PRE: NORMAL
GAW %PRED: NORMAL
GAW PRED: NORMAL
GAW: NORMAL
IC PRE %PRED: NORMAL
IC PRED: NORMAL
IC: NORMAL
MEP: NORMAL
MIP: NORMAL
MVV %PRED-PRE: NORMAL
MVV PRED: NORMAL
MVV-PRE: NORMAL
PEF %PRED-POST: NORMAL
PEF %PRED-PRE: NORMAL
PEF PRED: NORMAL
PEF%CHNG: NORMAL
PEF-POST: NORMAL
PEF-PRE: NORMAL
RAW %PRED: NORMAL
RAW PRED: NORMAL
RAW: NORMAL
RV PRE %PRED: NORMAL
RV PRED: NORMAL
RV: NORMAL
SVC %PRED: NORMAL
SVC PRED: NORMAL
SVC: NORMAL
TLC PRE %PRED: 71 %
TLC PRED: NORMAL
TLC: NORMAL
VA %PRED: NORMAL
VA PRED: NORMAL
VA: NORMAL
VTG %PRED: NORMAL
VTG PRED: NORMAL
VTG: NORMAL

## 2024-05-01 PROCEDURE — 94726 PLETHYSMOGRAPHY LUNG VOLUMES: CPT

## 2024-05-01 PROCEDURE — 94060 EVALUATION OF WHEEZING: CPT

## 2024-05-01 PROCEDURE — 6370000000 HC RX 637 (ALT 250 FOR IP): Performed by: INTERNAL MEDICINE

## 2024-05-01 PROCEDURE — 94060 EVALUATION OF WHEEZING: CPT | Performed by: INTERNAL MEDICINE

## 2024-05-01 PROCEDURE — 94729 DIFFUSING CAPACITY: CPT

## 2024-05-01 PROCEDURE — 94760 N-INVAS EAR/PLS OXIMETRY 1: CPT

## 2024-05-01 PROCEDURE — 94726 PLETHYSMOGRAPHY LUNG VOLUMES: CPT | Performed by: INTERNAL MEDICINE

## 2024-05-01 PROCEDURE — 71250 CT THORAX DX C-: CPT

## 2024-05-01 PROCEDURE — 94729 DIFFUSING CAPACITY: CPT | Performed by: INTERNAL MEDICINE

## 2024-05-01 RX ORDER — ALBUTEROL SULFATE 90 UG/1
4 AEROSOL, METERED RESPIRATORY (INHALATION) ONCE
Status: COMPLETED | OUTPATIENT
Start: 2024-05-01 | End: 2024-05-01

## 2024-05-01 RX ADMIN — Medication 4 PUFF: at 12:21

## 2024-05-01 ASSESSMENT — PULMONARY FUNCTION TESTS
FEV1_PERCENT_PREDICTED_PRE: 43
FVC_PERCENT_PREDICTED_PRE: 95
FEV1/FVC_PERCENT_PREDICTED_POST: 50
FVC_PERCENT_PREDICTED_POST: 92
FEV1/FVC_PERCENT_PREDICTED_PRE: 45
FEV1_PERCENT_PREDICTED_POST: 47

## 2024-05-01 NOTE — PROCEDURES
Pulmonary Function Testing      Patient:  Dawood Epps  68 y.o. male    Unit #:   [id number]   Date of test:  5/1/2024   Date of interpretation:   5/1/2024      REASON FOR TEST:   COPD exacerbation     TEST RESULTS:     SPIROMETRY:  Spirometry quality is good.  Test met ATS criteria for acceptability and reproducibility.  FEV1/FVC ratio is: 34%.  FEV1 is 1.5 L, 43% of predicted while FVC is 4.5 L, 95% of predicted.  There is no bronchodilator response.     The shape of the flow volume curve is obstructive.     LUNG VOLUMES:  Total lung capacity is 71% of predicted.  Residual volume is 135% of predicted.  RV/TLC is 181%.  Expiratory residual volume is 35% of predicted.  This is likely reduced due to body habitus.     GAS DIFFUSION:  Diffusion capacity for carbon monoxide is 65% of predicted, corrected to hemoglobin level. Reduced gas diffusion can be seen in interstitial lung disease, pulmonary vascular disease, anemia, emphysema, among others.       IMPRESSION:  Severe combined obstructive and restrictive defects.  No bronchodilator response.  Air trapping.  Mild to moderate gas diffusion defect.  Clinical correlation recommended.      _____________________________________________________________  Electronically signed by:  Pooja Ma MD,FACP    5/1/2024    4:26 PM.     [facility]  [address], [place] [address]   Phone (office): [phone]

## 2024-05-08 ENCOUNTER — OFFICE VISIT (OUTPATIENT)
Dept: PULMONOLOGY | Age: 69
End: 2024-05-08
Payer: COMMERCIAL

## 2024-05-08 VITALS
BODY MASS INDEX: 23.86 KG/M2 | DIASTOLIC BLOOD PRESSURE: 89 MMHG | WEIGHT: 180 LBS | HEART RATE: 61 BPM | OXYGEN SATURATION: 95 % | SYSTOLIC BLOOD PRESSURE: 161 MMHG | HEIGHT: 73 IN | RESPIRATION RATE: 16 BRPM | TEMPERATURE: 97.8 F

## 2024-05-08 DIAGNOSIS — J44.9 COPD MIXED TYPE (HCC): Primary | ICD-10-CM

## 2024-05-08 PROCEDURE — 3077F SYST BP >= 140 MM HG: CPT | Performed by: INTERNAL MEDICINE

## 2024-05-08 PROCEDURE — 99406 BEHAV CHNG SMOKING 3-10 MIN: CPT | Performed by: INTERNAL MEDICINE

## 2024-05-08 PROCEDURE — 1123F ACP DISCUSS/DSCN MKR DOCD: CPT | Performed by: INTERNAL MEDICINE

## 2024-05-08 PROCEDURE — 99214 OFFICE O/P EST MOD 30 MIN: CPT | Performed by: INTERNAL MEDICINE

## 2024-05-08 PROCEDURE — 94664 DEMO&/EVAL PT USE INHALER: CPT | Performed by: INTERNAL MEDICINE

## 2024-05-08 PROCEDURE — 3079F DIAST BP 80-89 MM HG: CPT | Performed by: INTERNAL MEDICINE

## 2024-05-08 NOTE — PATIENT INSTRUCTIONS
Reviewed the findings of the CT scan as well as the PFTs with the patient and explained results.  Continue Trelegy Ellipta 200 mcg with albuterol as needed  No interest in nebulizer or pulmonary rehab currently  Inhaler education provided  Smoking cessation counseling provided.  No interest in nicotine replacement, Chantix or Wellbutrin currently.  Follow-up low-dose CT scan in 12 months  Follow-up clinic visit in 6 months      Health Maintenance/Preventive measures:        >>  Avoid exposure to tobacco, irritants, allergens as possible as well as contact with patients with infectious respiratory illness.        >>  Stay up-to-date with influenza & pneumonia vaccines, RSV, & COVID-19 vaccine as recommended by the Advisory Committee on Immunization Practices (ACIP)        >>  Healthy diet and activity as able.        >>  Acid reflux precautions: Head of bed elevation, avoiding tight clothes, avoiding big meals or snacking 3 hours before bedtime, targeting healthy weight.        >>  Practice sleep hygiene measures. Avoid driving or operating heavy machines if tired or sleepy.

## 2024-05-29 ENCOUNTER — TELEPHONE (OUTPATIENT)
Dept: PULMONOLOGY | Age: 69
End: 2024-05-29

## 2024-05-29 RX ORDER — NICOTINE 21 MG/24HR
1 PATCH, TRANSDERMAL 24 HOURS TRANSDERMAL DAILY
Qty: 42 PATCH | Refills: 0 | Status: SHIPPED | OUTPATIENT
Start: 2024-05-29 | End: 2024-07-10

## 2024-05-29 NOTE — TELEPHONE ENCOUNTER
Pts wife, Anika, called stating that at Pts last appointment Dr. Ma said he could call him in medication to help quit smoking. Pt has Requested this medication but would prefer the pills because he has tried the patches and they didn't help. Anika asked that someone please give her a call back if medication is sent in. Please Advise.

## 2024-06-10 RX ORDER — ATORVASTATIN CALCIUM 80 MG/1
80 TABLET, FILM COATED ORAL NIGHTLY
Qty: 90 TABLET | Refills: 3 | Status: SHIPPED | OUTPATIENT
Start: 2024-06-10

## 2024-06-28 DIAGNOSIS — I10 HTN (HYPERTENSION), BENIGN: ICD-10-CM

## 2024-07-01 RX ORDER — AMLODIPINE BESYLATE 5 MG/1
5 TABLET ORAL DAILY
Qty: 90 TABLET | OUTPATIENT
Start: 2024-07-01

## 2024-10-07 ENCOUNTER — OFFICE VISIT (OUTPATIENT)
Dept: INTERNAL MEDICINE CLINIC | Age: 69
End: 2024-10-07
Payer: COMMERCIAL

## 2024-10-07 VITALS
HEART RATE: 74 BPM | HEIGHT: 73 IN | WEIGHT: 180 LBS | SYSTOLIC BLOOD PRESSURE: 132 MMHG | BODY MASS INDEX: 23.86 KG/M2 | OXYGEN SATURATION: 98 % | DIASTOLIC BLOOD PRESSURE: 64 MMHG

## 2024-10-07 DIAGNOSIS — Z86.73 HISTORY OF CVA (CEREBROVASCULAR ACCIDENT): ICD-10-CM

## 2024-10-07 DIAGNOSIS — E78.5 DYSLIPIDEMIA: ICD-10-CM

## 2024-10-07 DIAGNOSIS — Z91.09 ENVIRONMENTAL ALLERGIES: ICD-10-CM

## 2024-10-07 DIAGNOSIS — I10 HTN (HYPERTENSION), BENIGN: Primary | ICD-10-CM

## 2024-10-07 DIAGNOSIS — Z86.79 HISTORY OF CAROTID STENOSIS: ICD-10-CM

## 2024-10-07 DIAGNOSIS — J43.9 BULLOUS EMPHYSEMA (HCC): ICD-10-CM

## 2024-10-07 PROCEDURE — 1123F ACP DISCUSS/DSCN MKR DOCD: CPT | Performed by: INTERNAL MEDICINE

## 2024-10-07 PROCEDURE — 3075F SYST BP GE 130 - 139MM HG: CPT | Performed by: INTERNAL MEDICINE

## 2024-10-07 PROCEDURE — 99214 OFFICE O/P EST MOD 30 MIN: CPT | Performed by: INTERNAL MEDICINE

## 2024-10-07 PROCEDURE — 3078F DIAST BP <80 MM HG: CPT | Performed by: INTERNAL MEDICINE

## 2024-10-07 RX ORDER — ALBUTEROL SULFATE 90 UG/1
2 INHALANT RESPIRATORY (INHALATION) 4 TIMES DAILY PRN
Qty: 18 G | Refills: 3 | Status: SHIPPED | OUTPATIENT
Start: 2024-10-07

## 2024-10-07 RX ORDER — AMLODIPINE BESYLATE 5 MG/1
5 TABLET ORAL DAILY
Qty: 30 TABLET | Refills: 5 | Status: SHIPPED | OUTPATIENT
Start: 2024-10-07

## 2024-10-07 SDOH — ECONOMIC STABILITY: INCOME INSECURITY: HOW HARD IS IT FOR YOU TO PAY FOR THE VERY BASICS LIKE FOOD, HOUSING, MEDICAL CARE, AND HEATING?: NOT HARD AT ALL

## 2024-10-07 SDOH — ECONOMIC STABILITY: FOOD INSECURITY: WITHIN THE PAST 12 MONTHS, THE FOOD YOU BOUGHT JUST DIDN'T LAST AND YOU DIDN'T HAVE MONEY TO GET MORE.: NEVER TRUE

## 2024-10-07 SDOH — ECONOMIC STABILITY: FOOD INSECURITY: WITHIN THE PAST 12 MONTHS, YOU WORRIED THAT YOUR FOOD WOULD RUN OUT BEFORE YOU GOT MONEY TO BUY MORE.: NEVER TRUE

## 2024-10-07 NOTE — PROGRESS NOTES
(VENTOLIN HFA) 108 (90 Base) MCG/ACT inhaler Inhale 2 puffs into the lungs 4 times daily as needed for Wheezing 18 g 0         Review of Systems: 14 systems were negative except of what was stated on HPI    Nursing note and vitals reviewed.    Vitals:    10/07/24 0826   BP: 132/64   Site: Left Upper Arm   Position: Sitting   Cuff Size: Medium Adult   Pulse: 74   SpO2: 98%   Weight: 81.6 kg (180 lb)   Height: 1.854 m (6' 1\")     Wt Readings from Last 3 Encounters:   10/07/24 81.6 kg (180 lb)   05/08/24 81.6 kg (180 lb)   04/08/24 80.7 kg (178 lb)     BP Readings from Last 3 Encounters:   10/07/24 132/64   05/08/24 (!) 161/89   04/08/24 (!) 148/70     Body mass index is 23.75 kg/m².  Constitutional: Patient appears well-developed and well-nourished. No distress.   Head: Normocephalic and atraumatic.   Neck: Normal range of motion. Neck supple. No thyroidmegaly.   Cardiovascular: Normal rate, regular rhythm, normal heart sounds and intact distal pulses.   Pulmonary/Chest: Effort normal and breath sounds normal. No stridor. No respiratory distress. No wheezes and no rales.   Abdominal: Soft. Bowel sounds are normal. No distension and no mass. No tenderness. No rebound and no guarding.   Musculoskeletal: No edema and no tenderness.   Skin: No rash or erythema.

## 2025-01-11 ENCOUNTER — HOSPITAL ENCOUNTER (INPATIENT)
Age: 70
LOS: 10 days | Discharge: NURSING, REHAB OR LONG-TERM CARE | DRG: 234 | End: 2025-01-21
Attending: EMERGENCY MEDICINE | Admitting: STUDENT IN AN ORGANIZED HEALTH CARE EDUCATION/TRAINING PROGRAM
Payer: MEDICARE

## 2025-01-11 ENCOUNTER — APPOINTMENT (OUTPATIENT)
Dept: GENERAL RADIOLOGY | Age: 70
DRG: 234 | End: 2025-01-11
Payer: MEDICARE

## 2025-01-11 DIAGNOSIS — R07.9 CHEST PAIN: ICD-10-CM

## 2025-01-11 DIAGNOSIS — Z95.1 S/P CABG (CORONARY ARTERY BYPASS GRAFT): ICD-10-CM

## 2025-01-11 DIAGNOSIS — I21.4 NSTEMI (NON-ST ELEVATED MYOCARDIAL INFARCTION) (HCC): Primary | ICD-10-CM

## 2025-01-11 DIAGNOSIS — N17.9 AKI (ACUTE KIDNEY INJURY) (HCC): ICD-10-CM

## 2025-01-11 DIAGNOSIS — J43.9 BULLOUS EMPHYSEMA (HCC): ICD-10-CM

## 2025-01-11 DIAGNOSIS — I25.110 CORONARY ARTERY DISEASE INVOLVING NATIVE CORONARY ARTERY OF NATIVE HEART WITH UNSTABLE ANGINA PECTORIS (HCC): ICD-10-CM

## 2025-01-11 LAB
ANION GAP SERPL CALCULATED.3IONS-SCNC: 9 MMOL/L (ref 3–16)
APTT BLD: 26.5 SEC (ref 22.1–36.4)
BASOPHILS # BLD: 0.1 K/UL (ref 0–0.2)
BASOPHILS NFR BLD: 1 %
BUN SERPL-MCNC: 26 MG/DL (ref 7–20)
CALCIUM SERPL-MCNC: 10.4 MG/DL (ref 8.3–10.6)
CHLORIDE SERPL-SCNC: 106 MMOL/L (ref 99–110)
CO2 SERPL-SCNC: 26 MMOL/L (ref 21–32)
CREAT SERPL-MCNC: 1.7 MG/DL (ref 0.8–1.3)
DEPRECATED RDW RBC AUTO: 15.5 % (ref 12.4–15.4)
EOSINOPHIL # BLD: 0.2 K/UL (ref 0–0.6)
EOSINOPHIL NFR BLD: 3 %
GFR SERPLBLD CREATININE-BSD FMLA CKD-EPI: 43 ML/MIN/{1.73_M2}
GLUCOSE SERPL-MCNC: 85 MG/DL (ref 70–99)
HCT VFR BLD AUTO: 45.9 % (ref 40.5–52.5)
HGB BLD-MCNC: 15.3 G/DL (ref 13.5–17.5)
INR PPP: 0.98 (ref 0.85–1.15)
LYMPHOCYTES # BLD: 2 K/UL (ref 1–5.1)
LYMPHOCYTES NFR BLD: 27.2 %
MCH RBC QN AUTO: 29.9 PG (ref 26–34)
MCHC RBC AUTO-ENTMCNC: 33.3 G/DL (ref 31–36)
MCV RBC AUTO: 89.9 FL (ref 80–100)
MONOCYTES # BLD: 0.7 K/UL (ref 0–1.3)
MONOCYTES NFR BLD: 10.1 %
NEUTROPHILS # BLD: 4.3 K/UL (ref 1.7–7.7)
NEUTROPHILS NFR BLD: 58.7 %
PLATELET # BLD AUTO: 202 K/UL (ref 135–450)
PMV BLD AUTO: 9.5 FL (ref 5–10.5)
POTASSIUM SERPL-SCNC: 5.3 MMOL/L (ref 3.5–5.1)
PROTHROMBIN TIME: 13.2 SEC (ref 11.9–14.9)
RBC # BLD AUTO: 5.1 M/UL (ref 4.2–5.9)
SODIUM SERPL-SCNC: 141 MMOL/L (ref 136–145)
TROPONIN, HIGH SENSITIVITY: 115 NG/L (ref 0–22)
TROPONIN, HIGH SENSITIVITY: 126 NG/L (ref 0–22)
TROPONIN, HIGH SENSITIVITY: 133 NG/L (ref 0–22)
WBC # BLD AUTO: 7.3 K/UL (ref 4–11)

## 2025-01-11 PROCEDURE — 36415 COLL VENOUS BLD VENIPUNCTURE: CPT

## 2025-01-11 PROCEDURE — 6360000002 HC RX W HCPCS: Performed by: NURSE PRACTITIONER

## 2025-01-11 PROCEDURE — 2500000003 HC RX 250 WO HCPCS: Performed by: STUDENT IN AN ORGANIZED HEALTH CARE EDUCATION/TRAINING PROGRAM

## 2025-01-11 PROCEDURE — 71046 X-RAY EXAM CHEST 2 VIEWS: CPT

## 2025-01-11 PROCEDURE — 6360000002 HC RX W HCPCS: Performed by: EMERGENCY MEDICINE

## 2025-01-11 PROCEDURE — 93005 ELECTROCARDIOGRAM TRACING: CPT | Performed by: EMERGENCY MEDICINE

## 2025-01-11 PROCEDURE — 6370000000 HC RX 637 (ALT 250 FOR IP): Performed by: STUDENT IN AN ORGANIZED HEALTH CARE EDUCATION/TRAINING PROGRAM

## 2025-01-11 PROCEDURE — 6360000002 HC RX W HCPCS

## 2025-01-11 PROCEDURE — 2580000003 HC RX 258

## 2025-01-11 PROCEDURE — P9047 ALBUMIN (HUMAN), 25%, 50ML: HCPCS

## 2025-01-11 PROCEDURE — 2580000003 HC RX 258: Performed by: EMERGENCY MEDICINE

## 2025-01-11 PROCEDURE — 85730 THROMBOPLASTIN TIME PARTIAL: CPT

## 2025-01-11 PROCEDURE — 85610 PROTHROMBIN TIME: CPT

## 2025-01-11 PROCEDURE — 1200000000 HC SEMI PRIVATE

## 2025-01-11 PROCEDURE — 2500000003 HC RX 250 WO HCPCS

## 2025-01-11 PROCEDURE — 99285 EMERGENCY DEPT VISIT HI MDM: CPT

## 2025-01-11 PROCEDURE — 2580000003 HC RX 258: Performed by: STUDENT IN AN ORGANIZED HEALTH CARE EDUCATION/TRAINING PROGRAM

## 2025-01-11 PROCEDURE — 80048 BASIC METABOLIC PNL TOTAL CA: CPT

## 2025-01-11 PROCEDURE — 85025 COMPLETE CBC W/AUTO DIFF WBC: CPT

## 2025-01-11 PROCEDURE — 84484 ASSAY OF TROPONIN QUANT: CPT

## 2025-01-11 RX ORDER — ACETAMINOPHEN 325 MG/1
650 TABLET ORAL EVERY 6 HOURS PRN
Status: DISCONTINUED | OUTPATIENT
Start: 2025-01-11 | End: 2025-01-14

## 2025-01-11 RX ORDER — ATORVASTATIN CALCIUM 80 MG/1
80 TABLET, FILM COATED ORAL NIGHTLY
Status: DISCONTINUED | OUTPATIENT
Start: 2025-01-11 | End: 2025-01-14

## 2025-01-11 RX ORDER — NICOTINE 21 MG/24HR
1 PATCH, TRANSDERMAL 24 HOURS TRANSDERMAL DAILY
Status: DISCONTINUED | OUTPATIENT
Start: 2025-01-11 | End: 2025-01-14

## 2025-01-11 RX ORDER — CETIRIZINE HYDROCHLORIDE 10 MG/1
10 TABLET ORAL NIGHTLY
Status: DISCONTINUED | OUTPATIENT
Start: 2025-01-11 | End: 2025-01-14

## 2025-01-11 RX ORDER — SODIUM CHLORIDE 0.9 % (FLUSH) 0.9 %
5-40 SYRINGE (ML) INJECTION PRN
Status: DISCONTINUED | OUTPATIENT
Start: 2025-01-11 | End: 2025-01-14

## 2025-01-11 RX ORDER — SODIUM CHLORIDE 0.9 % (FLUSH) 0.9 %
5-40 SYRINGE (ML) INJECTION EVERY 12 HOURS SCHEDULED
Status: DISCONTINUED | OUTPATIENT
Start: 2025-01-11 | End: 2025-01-14

## 2025-01-11 RX ORDER — FLUTICASONE PROPIONATE 50 MCG
2 SPRAY, SUSPENSION (ML) NASAL DAILY PRN
Status: DISCONTINUED | OUTPATIENT
Start: 2025-01-12 | End: 2025-01-14

## 2025-01-11 RX ORDER — 0.9 % SODIUM CHLORIDE 0.9 %
1000 INTRAVENOUS SOLUTION INTRAVENOUS ONCE
Status: COMPLETED | OUTPATIENT
Start: 2025-01-11 | End: 2025-01-11

## 2025-01-11 RX ORDER — POLYETHYLENE GLYCOL 3350 17 G/17G
17 POWDER, FOR SOLUTION ORAL DAILY PRN
Status: DISCONTINUED | OUTPATIENT
Start: 2025-01-11 | End: 2025-01-14

## 2025-01-11 RX ORDER — AMLODIPINE BESYLATE 5 MG/1
5 TABLET ORAL DAILY
Status: DISCONTINUED | OUTPATIENT
Start: 2025-01-11 | End: 2025-01-14

## 2025-01-11 RX ORDER — ACETAMINOPHEN 650 MG/1
650 SUPPOSITORY RECTAL EVERY 6 HOURS PRN
Status: DISCONTINUED | OUTPATIENT
Start: 2025-01-11 | End: 2025-01-14

## 2025-01-11 RX ORDER — HEPARIN SODIUM 1000 [USP'U]/ML
4000 INJECTION, SOLUTION INTRAVENOUS; SUBCUTANEOUS PRN
Status: DISCONTINUED | OUTPATIENT
Start: 2025-01-11 | End: 2025-01-14

## 2025-01-11 RX ORDER — BUDESONIDE AND FORMOTEROL FUMARATE DIHYDRATE 160; 4.5 UG/1; UG/1
2 AEROSOL RESPIRATORY (INHALATION)
Status: DISCONTINUED | OUTPATIENT
Start: 2025-01-11 | End: 2025-01-14

## 2025-01-11 RX ORDER — ASPIRIN 81 MG/1
81 TABLET ORAL DAILY
Status: DISCONTINUED | OUTPATIENT
Start: 2025-01-12 | End: 2025-01-14

## 2025-01-11 RX ORDER — ONDANSETRON 2 MG/ML
4 INJECTION INTRAMUSCULAR; INTRAVENOUS EVERY 6 HOURS PRN
Status: DISCONTINUED | OUTPATIENT
Start: 2025-01-11 | End: 2025-01-14

## 2025-01-11 RX ORDER — ALBUTEROL SULFATE 90 UG/1
2 INHALANT RESPIRATORY (INHALATION) 4 TIMES DAILY PRN
Status: DISCONTINUED | OUTPATIENT
Start: 2025-01-11 | End: 2025-01-14

## 2025-01-11 RX ORDER — HEPARIN SODIUM 1000 [USP'U]/ML
4000 INJECTION, SOLUTION INTRAVENOUS; SUBCUTANEOUS ONCE
Status: COMPLETED | OUTPATIENT
Start: 2025-01-11 | End: 2025-01-11

## 2025-01-11 RX ORDER — SODIUM CHLORIDE 9 MG/ML
INJECTION, SOLUTION INTRAVENOUS CONTINUOUS
Status: DISCONTINUED | OUTPATIENT
Start: 2025-01-11 | End: 2025-01-12

## 2025-01-11 RX ORDER — HEPARIN SODIUM 1000 [USP'U]/ML
2000 INJECTION, SOLUTION INTRAVENOUS; SUBCUTANEOUS PRN
Status: DISCONTINUED | OUTPATIENT
Start: 2025-01-11 | End: 2025-01-14

## 2025-01-11 RX ORDER — LABETALOL HYDROCHLORIDE 5 MG/ML
10 INJECTION, SOLUTION INTRAVENOUS EVERY 4 HOURS PRN
Status: DISCONTINUED | OUTPATIENT
Start: 2025-01-11 | End: 2025-01-14

## 2025-01-11 RX ORDER — POLYETHYLENE GLYCOL 3350 17 G
2 POWDER IN PACKET (EA) ORAL
Status: DISCONTINUED | OUTPATIENT
Start: 2025-01-11 | End: 2025-01-14

## 2025-01-11 RX ORDER — SODIUM CHLORIDE 9 MG/ML
INJECTION, SOLUTION INTRAVENOUS PRN
Status: DISCONTINUED | OUTPATIENT
Start: 2025-01-11 | End: 2025-01-14

## 2025-01-11 RX ORDER — ASPIRIN 81 MG/1
324 TABLET, CHEWABLE ORAL ONCE
Status: COMPLETED | OUTPATIENT
Start: 2025-01-11 | End: 2025-01-11

## 2025-01-11 RX ORDER — ONDANSETRON 4 MG/1
4 TABLET, ORALLY DISINTEGRATING ORAL EVERY 8 HOURS PRN
Status: DISCONTINUED | OUTPATIENT
Start: 2025-01-11 | End: 2025-01-14 | Stop reason: SDUPTHER

## 2025-01-11 RX ORDER — HEPARIN SODIUM 10000 [USP'U]/100ML
5-30 INJECTION, SOLUTION INTRAVENOUS CONTINUOUS
Status: DISCONTINUED | OUTPATIENT
Start: 2025-01-11 | End: 2025-01-14

## 2025-01-11 RX ADMIN — HEPARIN SODIUM 12 UNITS/KG/HR: 10000 INJECTION, SOLUTION INTRAVENOUS at 18:22

## 2025-01-11 RX ADMIN — SODIUM CHLORIDE 1000 ML: 9 INJECTION, SOLUTION INTRAVENOUS at 16:26

## 2025-01-11 RX ADMIN — HEPARIN SODIUM 4000 UNITS: 1000 INJECTION INTRAVENOUS; SUBCUTANEOUS at 18:18

## 2025-01-11 RX ADMIN — Medication 10 ML: at 21:41

## 2025-01-11 RX ADMIN — LABETALOL HYDROCHLORIDE 10 MG: 5 INJECTION INTRAVENOUS at 21:40

## 2025-01-11 RX ADMIN — SODIUM CHLORIDE: 9 INJECTION, SOLUTION INTRAVENOUS at 18:17

## 2025-01-11 RX ADMIN — ATORVASTATIN CALCIUM 80 MG: 80 TABLET, FILM COATED ORAL at 20:11

## 2025-01-11 RX ADMIN — ASPIRIN 324 MG: 81 TABLET, CHEWABLE ORAL at 18:46

## 2025-01-11 ASSESSMENT — PAIN - FUNCTIONAL ASSESSMENT: PAIN_FUNCTIONAL_ASSESSMENT: 0-10

## 2025-01-11 ASSESSMENT — PAIN SCALES - GENERAL
PAINLEVEL_OUTOF10: 0
PAINLEVEL_OUTOF10: 0

## 2025-01-11 ASSESSMENT — PAIN DESCRIPTION - LOCATION: LOCATION: CHEST

## 2025-01-11 NOTE — ED NOTES
Pt ambulates to RR with a steady gait.  Offered urinal at bedside d/t increased troponin but requesting to use RR.  Pt also requesting nicotine gum.  Currently chewing piece from home.  Will make admitting team aware so orders can be placed as necessary.

## 2025-01-11 NOTE — ED NOTES
Dawood Epps is a 69 y.o. male admitted for  Principal Problem:    NSTEMI (non-ST elevated myocardial infarction) ([facility])  Resolved Problems:    * No resolved hospital problems. *  .   Patient Home via self with   Chief Complaint   Patient presents with    Shoulder Pain     R shoulder pain w/ numbness for 3-4 days. NKI. Wife concerned about heart issues.    .  Patient is alert and Person, Place, Time, and Situation  Patient's baseline mobility: Baseline Mobility: Independent   Code Status: Prior   Cardiac Rhythm:       Is patient on baseline Oxygen: no how many Liters:   Abnormal Assessment Findings: R sided shoulder / CP    Isolation: None      NIH Score:    C-SSRS: Risk of Suicide: No Risk  Bedside swallow:        Active LDA's:   Peripheral IV 01/11/25 Right Antecubital (Active)   Site Assessment Clean, dry & intact 01/11/25 1518   Line Status Blood return noted;Flushed 01/11/25 1518   Phlebitis Assessment No symptoms 01/11/25 1518   Infiltration Assessment 0 01/11/25 1518   Dressing Status New dressing applied;Clean, dry & intact 01/11/25 1518   Dressing Type Transparent 01/11/25 1518   Dressing Intervention New 01/11/25 1518       Peripheral IV 01/11/25 Left Antecubital (Active)   Site Assessment Clean, dry & intact 01/11/25 1815   Line Status Blood return noted;Flushed 01/11/25 1815   Phlebitis Assessment No symptoms 01/11/25 1815   Infiltration Assessment 0 01/11/25 1815   Dressing Status New dressing applied;Clean, dry & intact 01/11/25 1815   Dressing Type Transparent 01/11/25 1815   Dressing Intervention New 01/11/25 1815     Patient admitted with a raines: no If the raines is chronic was it exchanged:  Reason for raines:   Patient admitted with Central Line:  NA . PICC line placement confirmed: YES OR NO:[id number]}   Reason for Central line:   Was central line Inserted from an outside facility:        Family/Caregiver Present yes Any Concerns: no   Restraints no  Sitter no         Vitals:      Vitals:    01/11/25

## 2025-01-11 NOTE — ED NOTES
Pt educated on fall and bleeding precautions prior to starting heparin.  Call light in reach.  Fall band placed on pt.  Instructed to use call button and wait for assistance when getting out of bed.

## 2025-01-11 NOTE — ED NOTES
CARDIOLOGY CONSULT  Called [facility] @1091.  Per; Italo Mckeon MD.  RE; elevated trop.  Dr. Arreaga responded @[phone].

## 2025-01-11 NOTE — H&P
V2.0  History and Physical      Name:  Dawood Epps /Age/Sex: 1955  (69 y.o. male)   MRN & CSN:  [id number] & [id number] Encounter Date/Time: 2025 6:03 PM EST   Location:   PCP: Suha Ruff MD       Hospital Day: 1    History from:     patient    History of Present Illness:     Chief Complaint: NSTEMI (non-ST elevated myocardial infarction) (HCC)    Dawood Epps is a 69 y.o. male with pmh of hypertension, hyperlipidemia, and COPD who presents with chief complaint of right shoulder pain that went into the right chest.  Patient otherwise states has been in his normal state of health.  Patient and his significant other family history is of heart disease and given the combination of shoulder and chest pain came in for evaluation.  The pain is in the right side and he does not currently have left-sided chest pain.  Denies any exertional component or diaphoresis.  EKG in the ER did show a new right bundle branch block and troponins were significant at 126 with a repeat that up trended to 133.  Patient also does have an SUNG with a creatinine of 1.7.  Patient does endorse not drinking a lot of water recently, but states that his appetite has been normal.  Patient otherwise has no acute complaints at this time.  Cardiology was consulted by the ER and patient was started on heparin infusion    Assessment and Plan:   Dawood Epps is a 69 y.o. male  who presents with NSTEMI (non-ST elevated myocardial infarction) (HCC)    NSTEMI  Patient with right shoulder and right sided chest pain. Trops 126-133. EKG without ischemic changes. New right bundle block  Cardiology consulted  Heparin drip  Trend troponin  ASA and statin  NPO at midnight    SUNG  Creatinine 1.7. Baseline 1.0.  Patient states that he has been monitoring a lot of water lately, but  Hold Lisinopril  IV hydration  Avoid nephrotoxic agents    Hypertension  Continue Amlodipine  Hold ACE in stetting of SUNG    COPD  Not in acute exacerbation  Continue

## 2025-01-11 NOTE — ED PROVIDER NOTES
THIS IS MY OPAL SUPERVISORY AND SHARED VISIT NOTE:    I personally saw the patient and made/approved the management plan and take responsibility for the patient management.    History: 69-year-old male presenting for ration of right-sided shoulder pain. History of CVA, HLD. Associated some paresthesias.  This has been ongoing for the past several days.  No known injuries.  Wife is concerned of potential cardiac issue.  No difficulty breathing. No trauma    Exam: Intact distal pulses, no respiratory distress appears in no acute distress no respiratory distress, no focal weakness    MDM: 69-year-old male presenting for evaluation of right-sided shoulder pain/paresthesias extends down into the pinky and ring finger of the right side no associated weakness.  Wife was concerned about ruling out cardiac component.  EKG is nonischemic.    Symptoms seem to be most fitting with C8 radiculopathy, however initial cardiac enzyme is elevated into the 120s which is above from baseline.  EKG is otherwise nonischemic.  He does have acute kidney injury.  Repeat troponin is 133, we spoke with his cardiology team to [facility] who did recommend starting heparinization however patient would like to stay here for hospitalization continue management.        I personally saw the patient and independently provided 0 minutes of non-concurrent critical care out of the total shared critical care time provided.     EKG  The Ekg interpreted by myself in the emergency department in the absence of a cardiologist.  normal sinus rhythm with a rate of 68  Axis is   Normal  QTc is  within an acceptable range  There is incomplete right bundle branch block  No specific ST-T wave changes appreciated.  No evidence of acute ischemia.   No significant change from prior EKG dated February 13, 2024    No results found.      I, Dr. Mckeon, am the primary clinician of record.     Comment: Please note this report has been produced using speech recognition 
for chest pain, palpitations and leg swelling.   Gastrointestinal:  Negative for abdominal pain, diarrhea, nausea and vomiting.   Musculoskeletal:  Positive for arthralgias. Negative for back pain and gait problem.   Skin:  Negative for rash and wound.   Neurological:  Negative for dizziness, syncope, weakness, light-headedness, numbness and headaches.         PHYSICAL EXAM  Vitals:    01/11/25 1720   BP: (!) 145/78   Pulse: 67   Resp: 17   Temp:    SpO2: 96%       Physical Exam  Constitutional:       General: He is not in acute distress.     Appearance: Normal appearance.   HENT:      Head: Normocephalic and atraumatic.      Right Ear: External ear normal.      Left Ear: External ear normal.      Nose: Nose normal.   Eyes:      Extraocular Movements: Extraocular movements intact.      Conjunctiva/sclera: Conjunctivae normal.      Pupils: Pupils are equal, round, and reactive to light.   Cardiovascular:      Rate and Rhythm: Normal rate and regular rhythm.      Pulses: Normal pulses.      Heart sounds: No murmur heard.     No friction rub. No gallop.   Pulmonary:      Effort: Pulmonary effort is normal.      Breath sounds: Normal breath sounds. No wheezing, rhonchi or rales.   Abdominal:      General: Abdomen is flat.      Palpations: Abdomen is soft.      Tenderness: There is no abdominal tenderness.   Musculoskeletal:         General: No deformity or signs of injury.      Right lower leg: No edema.      Left lower leg: No edema.      Comments: 5/5 strength with flexion and extension of the bilateral upper extremities. 5/5 hand  strength bilaterally. Normal sensation throughout bilateral upper extremities. Normal Neer test, normal empty can, normal tinel.     No tenderness to palpation of the cervical spine. Full ROM in the cervical spine with no tenderness.    Skin:     General: Skin is warm and dry.   Neurological:      Mental Status: He is alert and oriented to person, place, and time.      Comments:

## 2025-01-12 LAB
ANION GAP SERPL CALCULATED.3IONS-SCNC: 8 MMOL/L (ref 3–16)
ANTI-XA UNFRAC HEPARIN: 0.31 IU/ML (ref 0.3–0.7)
ANTI-XA UNFRAC HEPARIN: 0.36 IU/ML (ref 0.3–0.7)
BUN SERPL-MCNC: 24 MG/DL (ref 7–20)
CALCIUM SERPL-MCNC: 9.2 MG/DL (ref 8.3–10.6)
CHLORIDE SERPL-SCNC: 109 MMOL/L (ref 99–110)
CHOLEST SERPL-MCNC: 113 MG/DL (ref 0–199)
CO2 SERPL-SCNC: 24 MMOL/L (ref 21–32)
CREAT SERPL-MCNC: 1.4 MG/DL (ref 0.8–1.3)
DEPRECATED RDW RBC AUTO: 15.2 % (ref 12.4–15.4)
EKG ATRIAL RATE: 68 BPM
EKG DIAGNOSIS: NORMAL
EKG P AXIS: 70 DEGREES
EKG P-R INTERVAL: 138 MS
EKG Q-T INTERVAL: 358 MS
EKG QRS DURATION: 96 MS
EKG QTC CALCULATION (BAZETT): 380 MS
EKG R AXIS: 36 DEGREES
EKG T AXIS: 66 DEGREES
EKG VENTRICULAR RATE: 68 BPM
GFR SERPLBLD CREATININE-BSD FMLA CKD-EPI: 54 ML/MIN/{1.73_M2}
GLUCOSE SERPL-MCNC: 89 MG/DL (ref 70–99)
HCT VFR BLD AUTO: 39.9 % (ref 40.5–52.5)
HDLC SERPL-MCNC: 37 MG/DL (ref 40–60)
HGB BLD-MCNC: 13.5 G/DL (ref 13.5–17.5)
LDLC SERPL CALC-MCNC: 62 MG/DL
MCH RBC QN AUTO: 30 PG (ref 26–34)
MCHC RBC AUTO-ENTMCNC: 33.9 G/DL (ref 31–36)
MCV RBC AUTO: 88.6 FL (ref 80–100)
PLATELET # BLD AUTO: 178 K/UL (ref 135–450)
PMV BLD AUTO: 9.8 FL (ref 5–10.5)
POTASSIUM SERPL-SCNC: 4.6 MMOL/L (ref 3.5–5.1)
RBC # BLD AUTO: 4.5 M/UL (ref 4.2–5.9)
SODIUM SERPL-SCNC: 141 MMOL/L (ref 136–145)
TRIGL SERPL-MCNC: 68 MG/DL (ref 0–150)
TROPONIN, HIGH SENSITIVITY: 114 NG/L (ref 0–22)
TROPONIN, HIGH SENSITIVITY: 115 NG/L (ref 0–22)
TROPONIN, HIGH SENSITIVITY: 124 NG/L (ref 0–22)
TROPONIN, HIGH SENSITIVITY: 127 NG/L (ref 0–22)
VLDLC SERPL CALC-MCNC: 14 MG/DL
WBC # BLD AUTO: 6.8 K/UL (ref 4–11)

## 2025-01-12 PROCEDURE — 93010 ELECTROCARDIOGRAM REPORT: CPT | Performed by: INTERNAL MEDICINE

## 2025-01-12 PROCEDURE — 6370000000 HC RX 637 (ALT 250 FOR IP): Performed by: STUDENT IN AN ORGANIZED HEALTH CARE EDUCATION/TRAINING PROGRAM

## 2025-01-12 PROCEDURE — 83036 HEMOGLOBIN GLYCOSYLATED A1C: CPT

## 2025-01-12 PROCEDURE — 6370000000 HC RX 637 (ALT 250 FOR IP): Performed by: INTERNAL MEDICINE

## 2025-01-12 PROCEDURE — 99223 1ST HOSP IP/OBS HIGH 75: CPT | Performed by: INTERNAL MEDICINE

## 2025-01-12 PROCEDURE — 6360000002 HC RX W HCPCS: Performed by: STUDENT IN AN ORGANIZED HEALTH CARE EDUCATION/TRAINING PROGRAM

## 2025-01-12 PROCEDURE — 80048 BASIC METABOLIC PNL TOTAL CA: CPT

## 2025-01-12 PROCEDURE — 1200000000 HC SEMI PRIVATE

## 2025-01-12 PROCEDURE — 84484 ASSAY OF TROPONIN QUANT: CPT

## 2025-01-12 PROCEDURE — 36415 COLL VENOUS BLD VENIPUNCTURE: CPT

## 2025-01-12 PROCEDURE — 85027 COMPLETE CBC AUTOMATED: CPT

## 2025-01-12 PROCEDURE — 85520 HEPARIN ASSAY: CPT

## 2025-01-12 PROCEDURE — 80061 LIPID PANEL: CPT

## 2025-01-12 PROCEDURE — 94640 AIRWAY INHALATION TREATMENT: CPT

## 2025-01-12 RX ORDER — METHOCARBAMOL 500 MG/1
500 TABLET, FILM COATED ORAL 3 TIMES DAILY PRN
Status: DISCONTINUED | OUTPATIENT
Start: 2025-01-12 | End: 2025-01-14

## 2025-01-12 RX ORDER — SODIUM CHLORIDE 9 MG/ML
INJECTION, SOLUTION INTRAVENOUS CONTINUOUS
Status: ACTIVE | OUTPATIENT
Start: 2025-01-12 | End: 2025-01-13

## 2025-01-12 RX ORDER — METOPROLOL SUCCINATE 25 MG/1
25 TABLET, EXTENDED RELEASE ORAL 2 TIMES DAILY
Status: DISCONTINUED | OUTPATIENT
Start: 2025-01-12 | End: 2025-01-14

## 2025-01-12 RX ORDER — ALBUTEROL SULFATE 90 UG/1
2 INHALANT RESPIRATORY (INHALATION)
Status: DISCONTINUED | OUTPATIENT
Start: 2025-01-12 | End: 2025-01-14

## 2025-01-12 RX ADMIN — Medication 2 PUFF: at 19:29

## 2025-01-12 RX ADMIN — Medication 2 PUFF: at 11:24

## 2025-01-12 RX ADMIN — ASPIRIN 81 MG: 81 TABLET, COATED ORAL at 09:22

## 2025-01-12 RX ADMIN — METOPROLOL SUCCINATE 25 MG: 25 TABLET, FILM COATED, EXTENDED RELEASE ORAL at 20:42

## 2025-01-12 RX ADMIN — NICOTINE POLACRILEX 2 MG: 2 LOZENGE ORAL at 11:14

## 2025-01-12 RX ADMIN — Medication 2 PUFF: at 19:25

## 2025-01-12 RX ADMIN — TIOTROPIUM BROMIDE INHALATION SPRAY 2 PUFF: 3.12 SPRAY, METERED RESPIRATORY (INHALATION) at 11:25

## 2025-01-12 RX ADMIN — FLUTICASONE PROPIONATE 2 SPRAY: 50 SPRAY, METERED NASAL at 09:22

## 2025-01-12 RX ADMIN — HEPARIN SODIUM 12 UNITS/KG/HR: 10000 INJECTION, SOLUTION INTRAVENOUS at 17:31

## 2025-01-12 RX ADMIN — NICOTINE POLACRILEX 2 MG: 2 LOZENGE ORAL at 17:17

## 2025-01-12 RX ADMIN — ATORVASTATIN CALCIUM 80 MG: 80 TABLET, FILM COATED ORAL at 20:42

## 2025-01-12 RX ADMIN — AMLODIPINE BESYLATE 5 MG: 5 TABLET ORAL at 09:22

## 2025-01-13 ENCOUNTER — APPOINTMENT (OUTPATIENT)
Dept: VASCULAR LAB | Age: 70
DRG: 234 | End: 2025-01-13
Payer: MEDICARE

## 2025-01-13 ENCOUNTER — HOSPITAL ENCOUNTER (INPATIENT)
Dept: VASCULAR LAB | Age: 70
Discharge: HOME | DRG: 234 | End: 2025-01-15
Payer: MEDICARE

## 2025-01-13 ENCOUNTER — APPOINTMENT (OUTPATIENT)
Dept: CT IMAGING | Age: 70
DRG: 234 | End: 2025-01-13
Payer: MEDICARE

## 2025-01-13 ENCOUNTER — APPOINTMENT (OUTPATIENT)
Age: 70
DRG: 234 | End: 2025-01-13
Attending: STUDENT IN AN ORGANIZED HEALTH CARE EDUCATION/TRAINING PROGRAM
Payer: MEDICARE

## 2025-01-13 ENCOUNTER — CARE COORDINATION (OUTPATIENT)
Dept: CASE MANAGEMENT | Age: 70
End: 2025-01-13

## 2025-01-13 PROBLEM — R07.9 CHEST PAIN: Status: ACTIVE | Noted: 2025-01-11

## 2025-01-13 PROBLEM — I25.10 CAD (CORONARY ARTERY DISEASE): Status: ACTIVE | Noted: 2025-01-13

## 2025-01-13 LAB
ABO + RH BLD: NORMAL
ANION GAP SERPL CALCULATED.3IONS-SCNC: 11 MMOL/L (ref 3–16)
ANTI-XA UNFRAC HEPARIN: 0.2 IU/ML (ref 0.3–0.7)
ANTI-XA UNFRAC HEPARIN: 0.38 IU/ML (ref 0.3–0.7)
BASOPHILS # BLD: 0.1 K/UL (ref 0–0.2)
BASOPHILS NFR BLD: 0.8 %
BLD GP AB SCN SERPL QL: NORMAL
BUN SERPL-MCNC: 19 MG/DL (ref 7–20)
CALCIUM SERPL-MCNC: 9.5 MG/DL (ref 8.3–10.6)
CHLORIDE SERPL-SCNC: 108 MMOL/L (ref 99–110)
CO2 SERPL-SCNC: 24 MMOL/L (ref 21–32)
CREAT SERPL-MCNC: 1.2 MG/DL (ref 0.8–1.3)
DEPRECATED RDW RBC AUTO: 15.6 % (ref 12.4–15.4)
ECHO AO ASC DIAM: 3.4 CM
ECHO AO ASCENDING AORTA INDEX: 1.64 CM/M2
ECHO AO ROOT DIAM: 3 CM
ECHO AO ROOT INDEX: 1.45 CM/M2
ECHO AV AREA PEAK VELOCITY: 1.4 CM2
ECHO AV AREA VTI: 1.3 CM2
ECHO AV AREA/BSA PEAK VELOCITY: 0.7 CM2/M2
ECHO AV AREA/BSA VTI: 0.6 CM2/M2
ECHO AV CUSP MM: 1.1 CM
ECHO AV MEAN GRADIENT: 9 MMHG
ECHO AV MEAN VELOCITY: 1.4 M/S
ECHO AV PEAK GRADIENT: 18 MMHG
ECHO AV PEAK VELOCITY: 2.1 M/S
ECHO AV VELOCITY RATIO: 0.43
ECHO AV VTI: 47.6 CM
ECHO BSA: 2.06 M2
ECHO EST RA PRESSURE: 3 MMHG
ECHO IVC PROX: 1.9 CM
ECHO LA AREA 2C: 18.4 CM2
ECHO LA AREA 4C: 20.2 CM2
ECHO LA DIAMETER INDEX: 1.59 CM/M2
ECHO LA DIAMETER: 3.3 CM
ECHO LA MAJOR AXIS: 5.9 CM
ECHO LA MINOR AXIS: 5 CM
ECHO LA TO AORTIC ROOT RATIO: 1.1
ECHO LA VOL BP: 57 ML (ref 18–58)
ECHO LA VOL MOD A2C: 52 ML (ref 18–58)
ECHO LA VOL MOD A4C: 53 ML (ref 18–58)
ECHO LA VOL/BSA BIPLANE: 28 ML/M2 (ref 16–34)
ECHO LA VOLUME INDEX MOD A2C: 25 ML/M2 (ref 16–34)
ECHO LA VOLUME INDEX MOD A4C: 26 ML/M2 (ref 16–34)
ECHO LV E' LATERAL VELOCITY: 8.7 CM/S
ECHO LV E' SEPTAL VELOCITY: 9.9 CM/S
ECHO LV EDV 3D: 128 ML
ECHO LV EDV INDEX 3D: 62 ML/M2
ECHO LV EJECTION FRACTION 3D: 58 %
ECHO LV ESV 3D: 54 ML
ECHO LV ESV INDEX 3D: 26 ML/M2
ECHO LV FRACTIONAL SHORTENING: 36 % (ref 28–44)
ECHO LV GLOBAL LONGITUDINAL STRAIN (GLS): -10.2 %
ECHO LV GLOBAL LONGITUDINAL STRAIN (GLS): -13 %
ECHO LV GLOBAL LONGITUDINAL STRAIN (GLS): -13.1 %
ECHO LV GLOBAL LONGITUDINAL STRAIN (GLS): -4.5 %
ECHO LV INTERNAL DIMENSION DIASTOLE INDEX: 2.27 CM/M2
ECHO LV INTERNAL DIMENSION DIASTOLIC: 4.7 CM (ref 4.2–5.9)
ECHO LV INTERNAL DIMENSION SYSTOLIC INDEX: 1.45 CM/M2
ECHO LV INTERNAL DIMENSION SYSTOLIC: 3 CM
ECHO LV ISOVOLUMETRIC RELAXATION TIME (IVRT): 77 MS
ECHO LV IVSD: 1.1 CM (ref 0.6–1)
ECHO LV MASS 2D: 187.5 G (ref 88–224)
ECHO LV MASS 3D INDEX: 62.8 G/M2
ECHO LV MASS 3D: 130 G
ECHO LV MASS INDEX 2D: 90.6 G/M2 (ref 49–115)
ECHO LV POSTERIOR WALL DIASTOLIC: 1.1 CM (ref 0.6–1)
ECHO LV RELATIVE WALL THICKNESS RATIO: 0.47
ECHO LVOT AREA: 3.1 CM2
ECHO LVOT AV VTI INDEX: 0.43
ECHO LVOT DIAM: 2 CM
ECHO LVOT MEAN GRADIENT: 2 MMHG
ECHO LVOT PEAK GRADIENT: 4 MMHG
ECHO LVOT PEAK VELOCITY: 0.9 M/S
ECHO LVOT STROKE VOLUME INDEX: 30.8 ML/M2
ECHO LVOT SV: 63.7 ML
ECHO LVOT VTI: 20.3 CM
ECHO MV A VELOCITY: 0.67 M/S
ECHO MV E DECELERATION TIME (DT): 238 MS
ECHO MV E VELOCITY: 0.99 M/S
ECHO MV E/A RATIO: 1.48
ECHO MV E/E' LATERAL: 11.38
ECHO MV E/E' RATIO (AVERAGED): 10.69
ECHO MV E/E' SEPTAL: 10
ECHO RA AREA 4C: 17.3 CM2
ECHO RA END SYSTOLIC VOLUME APICAL 4 CHAMBER INDEX BSA: 21 ML/M2
ECHO RA VOLUME: 44 ML
ECHO RIGHT VENTRICULAR SYSTOLIC PRESSURE (RVSP): 34 MMHG
ECHO RV FREE WALL PEAK S': 14 CM/S
ECHO RV TAPSE: 2.3 CM (ref 1.7–?)
ECHO TV REGURGITANT MAX VELOCITY: 2.77 M/S
ECHO TV REGURGITANT PEAK GRADIENT: 31 MMHG
EOSINOPHIL # BLD: 0.3 K/UL (ref 0–0.6)
EOSINOPHIL NFR BLD: 4.2 %
EST. AVERAGE GLUCOSE BLD GHB EST-MCNC: 125.5 MG/DL
GFR SERPLBLD CREATININE-BSD FMLA CKD-EPI: 65 ML/MIN/{1.73_M2}
GLUCOSE SERPL-MCNC: 100 MG/DL (ref 70–99)
HBA1C MFR BLD: 6 %
HCT VFR BLD AUTO: 40.5 % (ref 40.5–52.5)
HGB BLD-MCNC: 13.7 G/DL (ref 13.5–17.5)
LYMPHOCYTES # BLD: 2.4 K/UL (ref 1–5.1)
LYMPHOCYTES NFR BLD: 36.9 %
MCH RBC QN AUTO: 30 PG (ref 26–34)
MCHC RBC AUTO-ENTMCNC: 33.8 G/DL (ref 31–36)
MCV RBC AUTO: 88.9 FL (ref 80–100)
MONOCYTES # BLD: 0.7 K/UL (ref 0–1.3)
MONOCYTES NFR BLD: 10.8 %
NEUTROPHILS # BLD: 3 K/UL (ref 1.7–7.7)
NEUTROPHILS NFR BLD: 47.3 %
PLATELET # BLD AUTO: 175 K/UL (ref 135–450)
PMV BLD AUTO: 9 FL (ref 5–10.5)
POTASSIUM SERPL-SCNC: 4.2 MMOL/L (ref 3.5–5.1)
RBC # BLD AUTO: 4.55 M/UL (ref 4.2–5.9)
SODIUM SERPL-SCNC: 143 MMOL/L (ref 136–145)
TROPONIN, HIGH SENSITIVITY: 114 NG/L (ref 0–22)
TROPONIN, HIGH SENSITIVITY: 119 NG/L (ref 0–22)
VAS LEFT CCA DIST EDV: 13.3 CM/S
VAS LEFT CCA DIST PSV: 85.5 CM/S
VAS LEFT CCA MID EDV: 17.2 CM/S
VAS LEFT CCA MID PSV: 92.5 CM/S
VAS LEFT CCA PROX EDV: 17.2 CM/S
VAS LEFT CCA PROX PSV: 72.1 CM/S
VAS LEFT ECA EDV: 11.8 CM/S
VAS LEFT ECA PSV: 72.9 CM/S
VAS LEFT GSV ANKLE DIAM: 1.3 MM
VAS LEFT GSV AT KNEE DIAM: 1.8 MM
VAS LEFT GSV BK DIST DIAM: 1.4 MM
VAS LEFT GSV BK MID DIAM: 1.46 MM
VAS LEFT GSV BK PROX DIAM: 1.8 MM
VAS LEFT GSV JUNC DIAM: 7.06 MM
VAS LEFT GSV THIGH DIST DIAM: 1.8 MM
VAS LEFT GSV THIGH MID DIAM: 1.7 MM
VAS LEFT GSV THIGH PROX DIAM: 2.2 MM
VAS LEFT ICA DIST EDV: 22.4 CM/S
VAS LEFT ICA DIST PSV: 66.5 CM/S
VAS LEFT ICA MID EDV: 26.7 CM/S
VAS LEFT ICA MID PSV: 66.5 CM/S
VAS LEFT ICA PROX EDV: 19.3 CM/S
VAS LEFT ICA PROX PSV: 53.4 CM/S
VAS LEFT ICA/CCA PSV: 0.72
VAS LEFT SUBCLAVIAN PROX PSV: 164 CM/S
VAS LEFT VERTEBRAL EDV: 10.5 CM/S
VAS LEFT VERTEBRAL PSV: 35.1 CM/S
VAS RIGHT CCA DIST EDV: 16.2 CM/S
VAS RIGHT CCA DIST PSV: 64.9 CM/S
VAS RIGHT CCA MID EDV: 14.7 CM/S
VAS RIGHT CCA MID PSV: 79.1 CM/S
VAS RIGHT CCA PROX EDV: 13.3 CM/S
VAS RIGHT CCA PROX PSV: 72.7 CM/S
VAS RIGHT ECA EDV: 7.86 CM/S
VAS RIGHT ECA PSV: 60.4 CM/S
VAS RIGHT GSV ANKLE DIAM: 1.8 MM
VAS RIGHT GSV AT KNEE DIAM: 1.6 MM
VAS RIGHT GSV BK DIST DIAM: 1.6 MM
VAS RIGHT GSV BK MID DIAM: 1.6 MM
VAS RIGHT GSV BK PROX DIAM: 1.54 MM
VAS RIGHT GSV JUNC DIAM: 4.06 MM
VAS RIGHT GSV THIGH DIST DIAM: 1.9 MM
VAS RIGHT GSV THIGH MID DIAM: 1.65 MM
VAS RIGHT GSV THIGH PROX DIAM: 2.02 MM
VAS RIGHT ICA DIST EDV: 23.6 CM/S
VAS RIGHT ICA DIST PSV: 72.2 CM/S
VAS RIGHT ICA MID EDV: 20.1 CM/S
VAS RIGHT ICA MID PSV: 61.9 CM/S
VAS RIGHT ICA PROX EDV: 14.3 CM/S
VAS RIGHT ICA PROX PSV: 66.3 CM/S
VAS RIGHT ICA/CCA PSV: 0.91
VAS RIGHT SUBCLAVIAN PROX PSV: 112 CM/S
VAS RIGHT VERTEBRAL EDV: 13.3 CM/S
VAS RIGHT VERTEBRAL PSV: 54.5 CM/S
WBC # BLD AUTO: 6.4 K/UL (ref 4–11)

## 2025-01-13 PROCEDURE — 85025 COMPLETE CBC W/AUTO DIFF WBC: CPT

## 2025-01-13 PROCEDURE — 6360000002 HC RX W HCPCS: Performed by: INTERNAL MEDICINE

## 2025-01-13 PROCEDURE — C1769 GUIDE WIRE: HCPCS | Performed by: INTERNAL MEDICINE

## 2025-01-13 PROCEDURE — 6370000000 HC RX 637 (ALT 250 FOR IP): Performed by: INTERNAL MEDICINE

## 2025-01-13 PROCEDURE — 6370000000 HC RX 637 (ALT 250 FOR IP): Performed by: STUDENT IN AN ORGANIZED HEALTH CARE EDUCATION/TRAINING PROGRAM

## 2025-01-13 PROCEDURE — 84484 ASSAY OF TROPONIN QUANT: CPT

## 2025-01-13 PROCEDURE — 2000000000 HC ICU R&B

## 2025-01-13 PROCEDURE — 93880 EXTRACRANIAL BILAT STUDY: CPT | Performed by: SURGERY

## 2025-01-13 PROCEDURE — 86901 BLOOD TYPING SEROLOGIC RH(D): CPT

## 2025-01-13 PROCEDURE — 86850 RBC ANTIBODY SCREEN: CPT

## 2025-01-13 PROCEDURE — 99152 MOD SED SAME PHYS/QHP 5/>YRS: CPT | Performed by: INTERNAL MEDICINE

## 2025-01-13 PROCEDURE — 80048 BASIC METABOLIC PNL TOTAL CA: CPT

## 2025-01-13 PROCEDURE — 93970 EXTREMITY STUDY: CPT

## 2025-01-13 PROCEDURE — 86900 BLOOD TYPING SEROLOGIC ABO: CPT

## 2025-01-13 PROCEDURE — 93306 TTE W/DOPPLER COMPLETE: CPT

## 2025-01-13 PROCEDURE — 2500000003 HC RX 250 WO HCPCS: Performed by: INTERNAL MEDICINE

## 2025-01-13 PROCEDURE — 6360000002 HC RX W HCPCS: Performed by: STUDENT IN AN ORGANIZED HEALTH CARE EDUCATION/TRAINING PROGRAM

## 2025-01-13 PROCEDURE — 99222 1ST HOSP IP/OBS MODERATE 55: CPT

## 2025-01-13 PROCEDURE — 93458 L HRT ARTERY/VENTRICLE ANGIO: CPT | Performed by: INTERNAL MEDICINE

## 2025-01-13 PROCEDURE — 4A023N7 MEASUREMENT OF CARDIAC SAMPLING AND PRESSURE, LEFT HEART, PERCUTANEOUS APPROACH: ICD-10-PCS | Performed by: INTERNAL MEDICINE

## 2025-01-13 PROCEDURE — 36415 COLL VENOUS BLD VENIPUNCTURE: CPT

## 2025-01-13 PROCEDURE — 71250 CT THORAX DX C-: CPT

## 2025-01-13 PROCEDURE — 85520 HEPARIN ASSAY: CPT

## 2025-01-13 PROCEDURE — 2709999900 HC NON-CHARGEABLE SUPPLY: Performed by: INTERNAL MEDICINE

## 2025-01-13 PROCEDURE — B2151ZZ FLUOROSCOPY OF LEFT HEART USING LOW OSMOLAR CONTRAST: ICD-10-PCS | Performed by: INTERNAL MEDICINE

## 2025-01-13 PROCEDURE — 6360000004 HC RX CONTRAST MEDICATION: Performed by: INTERNAL MEDICINE

## 2025-01-13 PROCEDURE — C1894 INTRO/SHEATH, NON-LASER: HCPCS | Performed by: INTERNAL MEDICINE

## 2025-01-13 PROCEDURE — 93970 EXTREMITY STUDY: CPT | Performed by: SURGERY

## 2025-01-13 PROCEDURE — B2111ZZ FLUOROSCOPY OF MULTIPLE CORONARY ARTERIES USING LOW OSMOLAR CONTRAST: ICD-10-PCS | Performed by: INTERNAL MEDICINE

## 2025-01-13 PROCEDURE — 7100000010 HC PHASE II RECOVERY - FIRST 15 MIN: Performed by: INTERNAL MEDICINE

## 2025-01-13 PROCEDURE — 7100000011 HC PHASE II RECOVERY - ADDTL 15 MIN: Performed by: INTERNAL MEDICINE

## 2025-01-13 PROCEDURE — 94640 AIRWAY INHALATION TREATMENT: CPT

## 2025-01-13 PROCEDURE — 94010 BREATHING CAPACITY TEST: CPT

## 2025-01-13 PROCEDURE — 93306 TTE W/DOPPLER COMPLETE: CPT | Performed by: INTERNAL MEDICINE

## 2025-01-13 PROCEDURE — 86923 COMPATIBILITY TEST ELECTRIC: CPT

## 2025-01-13 PROCEDURE — 93880 EXTRACRANIAL BILAT STUDY: CPT

## 2025-01-13 PROCEDURE — 6370000000 HC RX 637 (ALT 250 FOR IP)

## 2025-01-13 RX ORDER — HEPARIN SODIUM 5000 [USP'U]/ML
INJECTION, SOLUTION INTRAVENOUS; SUBCUTANEOUS PRN
Status: DISCONTINUED | OUTPATIENT
Start: 2025-01-13 | End: 2025-01-13 | Stop reason: HOSPADM

## 2025-01-13 RX ORDER — ONDANSETRON 2 MG/ML
4 INJECTION INTRAMUSCULAR; INTRAVENOUS EVERY 6 HOURS PRN
Status: DISCONTINUED | OUTPATIENT
Start: 2025-01-13 | End: 2025-01-14

## 2025-01-13 RX ORDER — SODIUM CHLORIDE 9 MG/ML
INJECTION, SOLUTION INTRAVENOUS CONTINUOUS
Status: DISCONTINUED | OUTPATIENT
Start: 2025-01-14 | End: 2025-01-14

## 2025-01-13 RX ORDER — METOPROLOL TARTRATE 25 MG/1
12.5 TABLET, FILM COATED ORAL ONCE
Status: DISCONTINUED | OUTPATIENT
Start: 2025-01-14 | End: 2025-01-14 | Stop reason: HOSPADM

## 2025-01-13 RX ORDER — VANCOMYCIN 1.5 G/300ML
1500 INJECTION, SOLUTION INTRAVENOUS
Status: COMPLETED | OUTPATIENT
Start: 2025-01-14 | End: 2025-01-14

## 2025-01-13 RX ORDER — SODIUM CHLORIDE 9 MG/ML
INJECTION, SOLUTION INTRAVENOUS PRN
Status: DISCONTINUED | OUTPATIENT
Start: 2025-01-13 | End: 2025-01-14

## 2025-01-13 RX ORDER — NITROGLYCERIN 20 MG/100ML
INJECTION INTRAVENOUS CONTINUOUS PRN
Status: COMPLETED | OUTPATIENT
Start: 2025-01-13 | End: 2025-01-13

## 2025-01-13 RX ORDER — MIDAZOLAM HYDROCHLORIDE 1 MG/ML
INJECTION, SOLUTION INTRAMUSCULAR; INTRAVENOUS PRN
Status: DISCONTINUED | OUTPATIENT
Start: 2025-01-13 | End: 2025-01-13 | Stop reason: HOSPADM

## 2025-01-13 RX ORDER — HYDRALAZINE HYDROCHLORIDE 20 MG/ML
10 INJECTION INTRAMUSCULAR; INTRAVENOUS EVERY 10 MIN PRN
Status: COMPLETED | OUTPATIENT
Start: 2025-01-13 | End: 2025-01-14

## 2025-01-13 RX ORDER — LORAZEPAM 0.5 MG/1
0.5 TABLET ORAL
Status: DISCONTINUED | OUTPATIENT
Start: 2025-01-13 | End: 2025-01-14

## 2025-01-13 RX ORDER — SODIUM CHLORIDE 0.9 % (FLUSH) 0.9 %
5-40 SYRINGE (ML) INJECTION EVERY 12 HOURS SCHEDULED
Status: DISCONTINUED | OUTPATIENT
Start: 2025-01-13 | End: 2025-01-14 | Stop reason: SDUPTHER

## 2025-01-13 RX ORDER — MUPIROCIN 20 MG/G
OINTMENT TOPICAL 2 TIMES DAILY
Status: DISCONTINUED | OUTPATIENT
Start: 2025-01-13 | End: 2025-01-14 | Stop reason: HOSPADM

## 2025-01-13 RX ORDER — CHLORHEXIDINE GLUCONATE ORAL RINSE 1.2 MG/ML
15 SOLUTION DENTAL ONCE
Status: COMPLETED | OUTPATIENT
Start: 2025-01-14 | End: 2025-01-14

## 2025-01-13 RX ORDER — CHLORHEXIDINE GLUCONATE 40 MG/ML
SOLUTION TOPICAL 2 TIMES DAILY
Status: COMPLETED | OUTPATIENT
Start: 2025-01-13 | End: 2025-01-14

## 2025-01-13 RX ORDER — ASPIRIN 81 MG/1
81 TABLET ORAL ONCE
Status: COMPLETED | OUTPATIENT
Start: 2025-01-14 | End: 2025-01-14

## 2025-01-13 RX ORDER — VERAPAMIL HYDROCHLORIDE 2.5 MG/ML
INJECTION, SOLUTION INTRAVENOUS PRN
Status: DISCONTINUED | OUTPATIENT
Start: 2025-01-13 | End: 2025-01-13 | Stop reason: HOSPADM

## 2025-01-13 RX ORDER — BISACODYL 10 MG
10 SUPPOSITORY, RECTAL RECTAL ONCE
Status: DISCONTINUED | OUTPATIENT
Start: 2025-01-13 | End: 2025-01-14

## 2025-01-13 RX ORDER — SODIUM CHLORIDE 0.9 % (FLUSH) 0.9 %
5-40 SYRINGE (ML) INJECTION PRN
Status: DISCONTINUED | OUTPATIENT
Start: 2025-01-13 | End: 2025-01-14 | Stop reason: SDUPTHER

## 2025-01-13 RX ORDER — ACETAMINOPHEN 500 MG
1000 TABLET ORAL ONCE
Status: COMPLETED | OUTPATIENT
Start: 2025-01-14 | End: 2025-01-14

## 2025-01-13 RX ORDER — IOPAMIDOL 755 MG/ML
INJECTION, SOLUTION INTRAVASCULAR PRN
Status: DISCONTINUED | OUTPATIENT
Start: 2025-01-13 | End: 2025-01-13 | Stop reason: HOSPADM

## 2025-01-13 RX ORDER — FENTANYL CITRATE 50 UG/ML
INJECTION, SOLUTION INTRAMUSCULAR; INTRAVENOUS PRN
Status: DISCONTINUED | OUTPATIENT
Start: 2025-01-13 | End: 2025-01-13 | Stop reason: HOSPADM

## 2025-01-13 RX ORDER — ASPIRIN 325 MG
325 TABLET ORAL ONCE
Status: COMPLETED | OUTPATIENT
Start: 2025-01-13 | End: 2025-01-13

## 2025-01-13 RX ADMIN — CHLORHEXIDINE GLUCONATE 118 ML: 40 SOLUTION TOPICAL at 21:40

## 2025-01-13 RX ADMIN — Medication 2 PUFF: at 19:28

## 2025-01-13 RX ADMIN — HEPARIN SODIUM 2000 UNITS: 1000 INJECTION INTRAVENOUS; SUBCUTANEOUS at 22:18

## 2025-01-13 RX ADMIN — Medication 2 PUFF: at 19:29

## 2025-01-13 RX ADMIN — Medication 2 PUFF: at 08:58

## 2025-01-13 RX ADMIN — HEPARIN SODIUM 14 UNITS/KG/HR: 10000 INJECTION, SOLUTION INTRAVENOUS at 22:18

## 2025-01-13 RX ADMIN — ATORVASTATIN CALCIUM 80 MG: 80 TABLET, FILM COATED ORAL at 21:39

## 2025-01-13 RX ADMIN — HEPARIN SODIUM 12 UNITS/KG/HR: 10000 INJECTION, SOLUTION INTRAVENOUS at 16:10

## 2025-01-13 RX ADMIN — AMLODIPINE BESYLATE 5 MG: 5 TABLET ORAL at 09:06

## 2025-01-13 RX ADMIN — MUPIROCIN: 20 OINTMENT TOPICAL at 21:41

## 2025-01-13 RX ADMIN — NICOTINE POLACRILEX 2 MG: 2 LOZENGE ORAL at 09:07

## 2025-01-13 RX ADMIN — Medication 10 ML: at 21:40

## 2025-01-13 RX ADMIN — METOPROLOL SUCCINATE 25 MG: 25 TABLET, FILM COATED, EXTENDED RELEASE ORAL at 09:06

## 2025-01-13 RX ADMIN — METOPROLOL SUCCINATE 25 MG: 25 TABLET, FILM COATED, EXTENDED RELEASE ORAL at 21:39

## 2025-01-13 RX ADMIN — TIOTROPIUM BROMIDE INHALATION SPRAY 2 PUFF: 3.12 SPRAY, METERED RESPIRATORY (INHALATION) at 08:58

## 2025-01-13 RX ADMIN — CETIRIZINE HYDROCHLORIDE 10 MG: 10 TABLET, FILM COATED ORAL at 21:39

## 2025-01-13 RX ADMIN — ASPIRIN 325 MG: 325 TABLET ORAL at 09:06

## 2025-01-13 ASSESSMENT — PAIN SCALES - GENERAL: PAINLEVEL_OUTOF10: 0

## 2025-01-13 NOTE — CARE COORDINATION
Left message on IP  number [phone] that patient is eligible for RPM  Alice Rodriguez, RN   [phone]

## 2025-01-13 NOTE — ACP (ADVANCE CARE PLANNING)
Advance Care Planning   General Advance Care Planning (ACP) Conversation    Date of Conversation: 1/11/2025  Conducted with: Patient with Decision Making Capacity  Other persons present: Spouse Anika    Healthcare Decision Maker:    Primary Decision Maker: Anika Roque - Other    Today we documented Decision Maker(s) consistent with Legal Next of Kin hierarchy.  Content/Action Overview:  Has NO ACP documents-Information provided  Reviewed DNR/DNI and patient elects Full Code (Attempt Resuscitation)      Length of Voluntary ACP Conversation in minutes:  <16 minutes (Non-Billable)    Jacy Clifton RN

## 2025-01-13 NOTE — CARE COORDINATION
Case Management Assessment  Initial Evaluation    Date/Time of Evaluation: 1/13/2025 1:14 PM  Assessment Completed by: Jacy Clifton RN    If patient is discharged prior to next notation, then this note serves as note for discharge by case management.    Patient Name: Dawood Epps                   YOB: 1955  Diagnosis: NSTEMI (non-ST elevated myocardial infarction) ([facility]) [I21.4]  SUNG (acute kidney injury) ([facility]) [N17.9]                   Date / Time: 1/11/2025  2:27 PM    Patient Admission Status: Inpatient   Readmission Risk (Low < 19, Mod (19-27), High > 27): Readmission Risk Score: 7.5    Current PCP: Suha Ruff MD  PCP verified by CM? Yes    Chart Reviewed: Yes      History Provided by: Patient  Patient Orientation: Alert and Oriented    Patient Cognition: Alert    Hospitalization in the last 30 days (Readmission):  No    If yes, Readmission Assessment in CM Navigator will be completed.    Advance Directives:      Code Status: Full Code   Patient's Primary Decision Maker is: Legal Next of Kin    Primary Decision Maker: Anika Roque - Beth    Discharge Planning:    Patient lives with: Spouse/Significant Other Type of Home: House  Primary Care Giver: Self  Patient Support Systems include: Spouse/Significant Other   Current Financial resources: Medicare  Current community resources: None  Current services prior to admission: None            Current DME:              Type of Home Care services:  None    ADLS  Prior functional level: Independent in ADLs/IADLs  Current functional level: Independent in ADLs/IADLs    PT AM-PAC:   /24  OT AM-PAC:   /24    Family can provide assistance at DC: Yes  Would you like Case Management to discuss the discharge plan with any other family members/significant others, and if so, who? Yes (wife)  Plans to Return to Present Housing: Yes  Other Identified Issues/Barriers to RETURNING to current housing:   Potential Assistance needed at discharge: N/A

## 2025-01-13 NOTE — POST SEDATION
Patient:  Dawood Epps   :   1955    A pre-sedation re-evaluation was performed immediately at the end of the procedure.  Procedure:  Cardiac cath  Medications: Procedural sedation with minimal conscious sedation  Complications: None  Estimated Blood Loss: none  Specimens: Were not obtained        Britany Medication and Procedural Reconciliation:  I agree that the documented medications and procedures performed are true.  The medications were given under my order.  The procedures were performed under my direct supervision.     An immediate re-assessment was completed prior to sedation, and it is determined to be safe to proceed.

## 2025-01-13 NOTE — PROCEDURES
Post Cardiac Catheterization Note.  Full details available in procedure log    DATE: 1/13/2025  PATIENT: Dawood Epps  MRN: [id number]    Procedure Performed:  [facility]  CORS  LVG    Procedure Findings:  Critical distal LM disease  70% ostial RCA    Conclusion/Recommendations:  CABG    Quiana Vidal  Interventional Cardiology  [facility]  Office [phone]

## 2025-01-14 ENCOUNTER — APPOINTMENT (OUTPATIENT)
Dept: GENERAL RADIOLOGY | Age: 70
DRG: 234 | End: 2025-01-14
Payer: MEDICARE

## 2025-01-14 ENCOUNTER — ANESTHESIA EVENT (OUTPATIENT)
Dept: OPERATING ROOM | Age: 70
End: 2025-01-14
Payer: MEDICARE

## 2025-01-14 ENCOUNTER — ANESTHESIA (OUTPATIENT)
Dept: OPERATING ROOM | Age: 70
End: 2025-01-14
Payer: MEDICARE

## 2025-01-14 LAB
ALBUMIN SERPL-MCNC: 2.7 G/DL (ref 3.4–5)
ALBUMIN SERPL-MCNC: 3.8 G/DL (ref 3.4–5)
ALP SERPL-CCNC: 65 U/L (ref 40–129)
ALT SERPL-CCNC: 27 U/L (ref 10–40)
ANION GAP SERPL CALCULATED.3IONS-SCNC: 10 MMOL/L (ref 3–16)
ANION GAP SERPL CALCULATED.3IONS-SCNC: 7 MMOL/L (ref 3–16)
ANTI-XA UNFRAC HEPARIN: 0.44 IU/ML (ref 0.3–0.7)
APTT BLD: 33.2 SEC (ref 22.1–36.4)
AST SERPL-CCNC: 27 U/L (ref 15–37)
BASE EXCESS BLDA CALC-SCNC: -1 MMOL/L (ref -3–3)
BASE EXCESS BLDA CALC-SCNC: -2 MMOL/L (ref -3–3)
BASE EXCESS BLDA CALC-SCNC: -2 MMOL/L (ref -3–3)
BASE EXCESS BLDA CALC-SCNC: -3 MMOL/L (ref -3–3)
BASE EXCESS BLDA CALC-SCNC: -5 MMOL/L (ref -3–3)
BASE EXCESS BLDA CALC-SCNC: -5 MMOL/L (ref -3–3)
BASE EXCESS BLDA CALC-SCNC: -6 MMOL/L (ref -3–3)
BASE EXCESS BLDA CALC-SCNC: 0 MMOL/L (ref -3–3)
BASE EXCESS BLDA CALC-SCNC: 0 MMOL/L (ref -3–3)
BASE EXCESS BLDA CALC-SCNC: 2 MMOL/L (ref -3–3)
BASE EXCESS BLDA CALC-SCNC: 3 MMOL/L (ref -3–3)
BASOPHILS # BLD: 0 K/UL (ref 0–0.2)
BASOPHILS # BLD: 0.1 K/UL (ref 0–0.2)
BASOPHILS NFR BLD: 0.3 %
BASOPHILS NFR BLD: 0.5 %
BILIRUB DIRECT SERPL-MCNC: 0.2 MG/DL (ref 0–0.3)
BILIRUB INDIRECT SERPL-MCNC: 0.2 MG/DL (ref 0–1)
BILIRUB SERPL-MCNC: 0.4 MG/DL (ref 0–1)
BUN SERPL-MCNC: 17 MG/DL (ref 7–20)
BUN SERPL-MCNC: 19 MG/DL (ref 7–20)
CA-I BLD-SCNC: 1.13 MMOL/L (ref 1.12–1.32)
CA-I BLD-SCNC: 1.13 MMOL/L (ref 1.12–1.32)
CA-I BLD-SCNC: 1.14 MMOL/L (ref 1.12–1.32)
CA-I BLD-SCNC: 1.17 MMOL/L (ref 1.12–1.32)
CA-I BLD-SCNC: 1.17 MMOL/L (ref 1.12–1.32)
CA-I BLD-SCNC: 1.22 MMOL/L (ref 1.12–1.32)
CA-I BLD-SCNC: 1.26 MMOL/L (ref 1.12–1.32)
CA-I BLD-SCNC: 1.27 MMOL/L (ref 1.12–1.32)
CA-I BLD-SCNC: 1.3 MMOL/L (ref 1.12–1.32)
CA-I BLD-SCNC: 1.38 MMOL/L (ref 1.12–1.32)
CA-I BLD-SCNC: 1.42 MMOL/L (ref 1.12–1.32)
CALCIUM SERPL-MCNC: 8 MG/DL (ref 8.3–10.6)
CALCIUM SERPL-MCNC: 9.2 MG/DL (ref 8.3–10.6)
CHLORIDE SERPL-SCNC: 106 MMOL/L (ref 99–110)
CHLORIDE SERPL-SCNC: 114 MMOL/L (ref 99–110)
CHOLEST SERPL-MCNC: 139 MG/DL (ref 0–199)
CO2 BLDA-SCNC: 22 MMOL/L
CO2 BLDA-SCNC: 23 MMOL/L
CO2 BLDA-SCNC: 24 MMOL/L
CO2 BLDA-SCNC: 24 MMOL/L
CO2 BLDA-SCNC: 25 MMOL/L
CO2 BLDA-SCNC: 25 MMOL/L
CO2 BLDA-SCNC: 26 MMOL/L
CO2 BLDA-SCNC: 26 MMOL/L
CO2 BLDA-SCNC: 27 MMOL/L
CO2 BLDA-SCNC: 28 MMOL/L
CO2 BLDA-SCNC: 29 MMOL/L
CO2 SERPL-SCNC: 24 MMOL/L (ref 21–32)
CO2 SERPL-SCNC: 24 MMOL/L (ref 21–32)
CREAT SERPL-MCNC: 1.3 MG/DL (ref 0.8–1.3)
CREAT SERPL-MCNC: 1.4 MG/DL (ref 0.8–1.3)
DEPRECATED RDW RBC AUTO: 15.2 % (ref 12.4–15.4)
DEPRECATED RDW RBC AUTO: 15.7 % (ref 12.4–15.4)
EOSINOPHIL # BLD: 0.3 K/UL (ref 0–0.6)
EOSINOPHIL # BLD: 0.4 K/UL (ref 0–0.6)
EOSINOPHIL NFR BLD: 1.9 %
EOSINOPHIL NFR BLD: 4.5 %
EST. AVERAGE GLUCOSE BLD GHB EST-MCNC: 122.6 MG/DL
FIBRINOGEN PPP-MCNC: 134 MG/DL (ref 227–534)
GFR SERPLBLD CREATININE-BSD FMLA CKD-EPI: 54 ML/MIN/{1.73_M2}
GFR SERPLBLD CREATININE-BSD FMLA CKD-EPI: 59 ML/MIN/{1.73_M2}
GLUCOSE BLD-MCNC: 108 MG/DL (ref 70–99)
GLUCOSE BLD-MCNC: 108 MG/DL (ref 70–99)
GLUCOSE BLD-MCNC: 114 MG/DL (ref 70–99)
GLUCOSE BLD-MCNC: 120 MG/DL (ref 70–99)
GLUCOSE BLD-MCNC: 120 MG/DL (ref 70–99)
GLUCOSE BLD-MCNC: 136 MG/DL (ref 70–99)
GLUCOSE BLD-MCNC: 151 MG/DL (ref 70–99)
GLUCOSE BLD-MCNC: 153 MG/DL (ref 70–99)
GLUCOSE BLD-MCNC: 157 MG/DL (ref 70–99)
GLUCOSE BLD-MCNC: 161 MG/DL (ref 70–99)
GLUCOSE BLD-MCNC: 75 MG/DL (ref 70–99)
GLUCOSE BLD-MCNC: 90 MG/DL (ref 70–99)
GLUCOSE SERPL-MCNC: 101 MG/DL (ref 70–99)
GLUCOSE SERPL-MCNC: 79 MG/DL (ref 70–99)
HBA1C MFR BLD: 5.9 %
HCO3 BLDA-SCNC: 20.7 MMOL/L (ref 21–29)
HCO3 BLDA-SCNC: 21.7 MMOL/L (ref 21–29)
HCO3 BLDA-SCNC: 22.2 MMOL/L (ref 21–29)
HCO3 BLDA-SCNC: 23.1 MMOL/L (ref 21–29)
HCO3 BLDA-SCNC: 23.2 MMOL/L (ref 21–29)
HCO3 BLDA-SCNC: 23.9 MMOL/L (ref 21–29)
HCO3 BLDA-SCNC: 24.4 MMOL/L (ref 21–29)
HCO3 BLDA-SCNC: 24.6 MMOL/L (ref 21–29)
HCO3 BLDA-SCNC: 26 MMOL/L (ref 21–29)
HCO3 BLDA-SCNC: 26.4 MMOL/L (ref 21–29)
HCO3 BLDA-SCNC: 27.3 MMOL/L (ref 21–29)
HCT VFR BLD AUTO: 25 % (ref 40.5–52.5)
HCT VFR BLD AUTO: 26 % (ref 40.5–52.5)
HCT VFR BLD AUTO: 27 % (ref 40.5–52.5)
HCT VFR BLD AUTO: 27 % (ref 40.5–52.5)
HCT VFR BLD AUTO: 29 % (ref 40.5–52.5)
HCT VFR BLD AUTO: 29 % (ref 40.5–52.5)
HCT VFR BLD AUTO: 31 % (ref 40.5–52.5)
HCT VFR BLD AUTO: 32 % (ref 40.5–52.5)
HCT VFR BLD AUTO: 32 % (ref 40.5–52.5)
HCT VFR BLD AUTO: 33 % (ref 40.5–52.5)
HCT VFR BLD AUTO: 34.6 % (ref 40.5–52.5)
HCT VFR BLD AUTO: 41 % (ref 40.5–52.5)
HCT VFR BLD AUTO: 45.3 % (ref 40.5–52.5)
HDLC SERPL-MCNC: 47 MG/DL (ref 40–60)
HGB BLD CALC-MCNC: 10.4 GM/DL (ref 13.5–17.5)
HGB BLD CALC-MCNC: 10.8 GM/DL (ref 13.5–17.5)
HGB BLD CALC-MCNC: 11 GM/DL (ref 13.5–17.5)
HGB BLD CALC-MCNC: 11.2 GM/DL (ref 13.5–17.5)
HGB BLD CALC-MCNC: 13.8 GM/DL (ref 13.5–17.5)
HGB BLD CALC-MCNC: 8.5 GM/DL (ref 13.5–17.5)
HGB BLD CALC-MCNC: 8.9 GM/DL (ref 13.5–17.5)
HGB BLD CALC-MCNC: 9.1 GM/DL (ref 13.5–17.5)
HGB BLD CALC-MCNC: 9.1 GM/DL (ref 13.5–17.5)
HGB BLD CALC-MCNC: 9.8 GM/DL (ref 13.5–17.5)
HGB BLD CALC-MCNC: 9.9 GM/DL (ref 13.5–17.5)
HGB BLD-MCNC: 11.6 G/DL (ref 13.5–17.5)
HGB BLD-MCNC: 15.2 G/DL (ref 13.5–17.5)
INR PPP: 0.99 (ref 0.85–1.15)
LACTATE BLD-SCNC: 1.59 MMOL/L (ref 0.4–2)
LACTATE BLD-SCNC: 1.64 MMOL/L (ref 0.4–2)
LACTATE BLD-SCNC: 1.77 MMOL/L (ref 0.4–2)
LACTATE BLD-SCNC: 2.12 MMOL/L (ref 0.4–2)
LDLC SERPL CALC-MCNC: 69 MG/DL
LYMPHOCYTES # BLD: 2.6 K/UL (ref 1–5.1)
LYMPHOCYTES # BLD: 2.7 K/UL (ref 1–5.1)
LYMPHOCYTES NFR BLD: 15.1 %
LYMPHOCYTES NFR BLD: 31.6 %
MAGNESIUM SERPL-MCNC: 1.61 MG/DL (ref 1.8–2.4)
MAGNESIUM SERPL-MCNC: 2.56 MG/DL (ref 1.8–2.4)
MCH RBC QN AUTO: 29.9 PG (ref 26–34)
MCH RBC QN AUTO: 29.9 PG (ref 26–34)
MCHC RBC AUTO-ENTMCNC: 33.4 G/DL (ref 31–36)
MCHC RBC AUTO-ENTMCNC: 33.7 G/DL (ref 31–36)
MCV RBC AUTO: 88.7 FL (ref 80–100)
MCV RBC AUTO: 89.5 FL (ref 80–100)
MONOCYTES # BLD: 0.8 K/UL (ref 0–1.3)
MONOCYTES # BLD: 1.1 K/UL (ref 0–1.3)
MONOCYTES NFR BLD: 10 %
MONOCYTES NFR BLD: 6.1 %
NEUTROPHILS # BLD: 13.6 K/UL (ref 1.7–7.7)
NEUTROPHILS # BLD: 4.4 K/UL (ref 1.7–7.7)
NEUTROPHILS NFR BLD: 53.4 %
NEUTROPHILS NFR BLD: 76.6 %
PCO2 BLDA: 34.1 MM HG (ref 35–45)
PCO2 BLDA: 37.5 MM HG (ref 35–45)
PCO2 BLDA: 39.1 MM HG (ref 35–45)
PCO2 BLDA: 39.2 MM HG (ref 35–45)
PCO2 BLDA: 39.6 MM HG (ref 35–45)
PCO2 BLDA: 41.7 MM HG (ref 35–45)
PCO2 BLDA: 43.4 MM HG (ref 35–45)
PCO2 BLDA: 46.8 MM HG (ref 35–45)
PCO2 BLDA: 47 MM HG (ref 35–45)
PCO2 BLDA: 49.8 MM HG (ref 35–45)
PCO2 BLDA: 62 MM HG (ref 35–45)
PERFORMED ON: ABNORMAL
PH BLDA: 7.25 [PH] (ref 7.35–7.45)
PH BLDA: 7.28 [PH] (ref 7.35–7.45)
PH BLDA: 7.28 [PH] (ref 7.35–7.45)
PH BLDA: 7.3 [PH] (ref 7.35–7.45)
PH BLDA: 7.3 [PH] (ref 7.35–7.45)
PH BLDA: 7.33 [PH] (ref 7.35–7.45)
PH BLDA: 7.38 [PH] (ref 7.35–7.45)
PH BLDA: 7.4 [PH] (ref 7.35–7.45)
PH BLDA: 7.41 [PH] (ref 7.35–7.45)
PH BLDA: 7.43 [PH] (ref 7.35–7.45)
PH BLDA: 7.49 [PH] (ref 7.35–7.45)
PHOSPHATE SERPL-MCNC: 2.2 MG/DL (ref 2.5–4.9)
PLATELET # BLD AUTO: 104 K/UL (ref 135–450)
PLATELET # BLD AUTO: 188 K/UL (ref 135–450)
PMV BLD AUTO: 9.2 FL (ref 5–10.5)
PMV BLD AUTO: 9.2 FL (ref 5–10.5)
PO2 BLDA: 107.5 MM HG (ref 75–108)
PO2 BLDA: 108.1 MM HG (ref 75–108)
PO2 BLDA: 148.3 MM HG (ref 75–108)
PO2 BLDA: 227.8 MM HG (ref 75–108)
PO2 BLDA: 351.4 MM HG (ref 75–108)
PO2 BLDA: 397.5 MM HG (ref 75–108)
PO2 BLDA: 431.5 MM HG (ref 75–108)
PO2 BLDA: 437.3 MM HG (ref 75–108)
PO2 BLDA: 448.7 MM HG (ref 75–108)
PO2 BLDA: 473.1 MM HG (ref 75–108)
PO2 BLDA: 87.2 MM HG (ref 75–108)
POC SAMPLE TYPE: ABNORMAL
POTASSIUM BLD-SCNC: 4.2 MMOL/L (ref 3.5–5.1)
POTASSIUM BLD-SCNC: 4.2 MMOL/L (ref 3.5–5.1)
POTASSIUM BLD-SCNC: 4.5 MMOL/L (ref 3.5–5.1)
POTASSIUM BLD-SCNC: 4.6 MMOL/L (ref 3.5–5.1)
POTASSIUM BLD-SCNC: 4.6 MMOL/L (ref 3.5–5.1)
POTASSIUM BLD-SCNC: 4.7 MMOL/L (ref 3.5–5.1)
POTASSIUM BLD-SCNC: 5.4 MMOL/L (ref 3.5–5.1)
POTASSIUM BLD-SCNC: 5.9 MMOL/L (ref 3.5–5.1)
POTASSIUM BLD-SCNC: 6.1 MMOL/L (ref 3.5–5.1)
POTASSIUM BLD-SCNC: 6.9 MMOL/L (ref 3.5–5.1)
POTASSIUM BLD-SCNC: 7.1 MMOL/L (ref 3.5–5.1)
POTASSIUM SERPL-SCNC: 4.2 MMOL/L (ref 3.5–5.1)
POTASSIUM SERPL-SCNC: 4.2 MMOL/L (ref 3.5–5.1)
POTASSIUM SERPL-SCNC: 4.9 MMOL/L (ref 3.5–5.1)
PROT SERPL-MCNC: 7.1 G/DL (ref 6.4–8.2)
PROTHROMBIN TIME: 13.3 SEC (ref 11.9–14.9)
RBC # BLD AUTO: 3.86 M/UL (ref 4.2–5.9)
RBC # BLD AUTO: 5.1 M/UL (ref 4.2–5.9)
SAO2 % BLDA: 100 % (ref 93–100)
SAO2 % BLDA: 96 % (ref 93–100)
SAO2 % BLDA: 97 % (ref 93–100)
SAO2 % BLDA: 98 % (ref 93–100)
SAO2 % BLDA: 99 % (ref 93–100)
SODIUM BLD-SCNC: 135 MMOL/L (ref 136–145)
SODIUM BLD-SCNC: 139 MMOL/L (ref 136–145)
SODIUM BLD-SCNC: 140 MMOL/L (ref 136–145)
SODIUM BLD-SCNC: 140 MMOL/L (ref 136–145)
SODIUM BLD-SCNC: 143 MMOL/L (ref 136–145)
SODIUM BLD-SCNC: 143 MMOL/L (ref 136–145)
SODIUM BLD-SCNC: 144 MMOL/L (ref 136–145)
SODIUM BLD-SCNC: 148 MMOL/L (ref 136–145)
SODIUM BLD-SCNC: 148 MMOL/L (ref 136–145)
SODIUM BLD-SCNC: 149 MMOL/L (ref 136–145)
SODIUM BLD-SCNC: 149 MMOL/L (ref 136–145)
SODIUM SERPL-SCNC: 140 MMOL/L (ref 136–145)
SODIUM SERPL-SCNC: 145 MMOL/L (ref 136–145)
TRIGL SERPL-MCNC: 115 MG/DL (ref 0–150)
TRIGL SERPL-MCNC: 58 MG/DL (ref 0–150)
VLDLC SERPL CALC-MCNC: 23 MG/DL
WBC # BLD AUTO: 17.8 K/UL (ref 4–11)
WBC # BLD AUTO: 8.3 K/UL (ref 4–11)

## 2025-01-14 PROCEDURE — 3E033XZ INTRODUCTION OF VASOPRESSOR INTO PERIPHERAL VEIN, PERCUTANEOUS APPROACH: ICD-10-PCS | Performed by: THORACIC SURGERY (CARDIOTHORACIC VASCULAR SURGERY)

## 2025-01-14 PROCEDURE — 7100000010 HC PHASE II RECOVERY - FIRST 15 MIN

## 2025-01-14 PROCEDURE — P9045 ALBUMIN (HUMAN), 5%, 250 ML: HCPCS

## 2025-01-14 PROCEDURE — 6370000000 HC RX 637 (ALT 250 FOR IP): Performed by: THORACIC SURGERY (CARDIOTHORACIC VASCULAR SURGERY)

## 2025-01-14 PROCEDURE — 6360000002 HC RX W HCPCS: Performed by: ANESTHESIOLOGY

## 2025-01-14 PROCEDURE — 85520 HEPARIN ASSAY: CPT

## 2025-01-14 PROCEDURE — 2580000003 HC RX 258

## 2025-01-14 PROCEDURE — C1729 CATH, DRAINAGE: HCPCS | Performed by: THORACIC SURGERY (CARDIOTHORACIC VASCULAR SURGERY)

## 2025-01-14 PROCEDURE — 6370000000 HC RX 637 (ALT 250 FOR IP)

## 2025-01-14 PROCEDURE — 80076 HEPATIC FUNCTION PANEL: CPT

## 2025-01-14 PROCEDURE — 6360000002 HC RX W HCPCS

## 2025-01-14 PROCEDURE — 94002 VENT MGMT INPAT INIT DAY: CPT

## 2025-01-14 PROCEDURE — 3600000018 HC SURGERY OHS ADDTL 15MIN: Performed by: THORACIC SURGERY (CARDIOTHORACIC VASCULAR SURGERY)

## 2025-01-14 PROCEDURE — 2500000003 HC RX 250 WO HCPCS: Performed by: ANESTHESIOLOGY

## 2025-01-14 PROCEDURE — 80069 RENAL FUNCTION PANEL: CPT

## 2025-01-14 PROCEDURE — 85610 PROTHROMBIN TIME: CPT

## 2025-01-14 PROCEDURE — 5A1221Z PERFORMANCE OF CARDIAC OUTPUT, CONTINUOUS: ICD-10-PCS | Performed by: THORACIC SURGERY (CARDIOTHORACIC VASCULAR SURGERY)

## 2025-01-14 PROCEDURE — 85347 COAGULATION TIME ACTIVATED: CPT

## 2025-01-14 PROCEDURE — 94799 UNLISTED PULMONARY SVC/PX: CPT

## 2025-01-14 PROCEDURE — C1751 CATH, INF, PER/CENT/MIDLINE: HCPCS | Performed by: THORACIC SURGERY (CARDIOTHORACIC VASCULAR SURGERY)

## 2025-01-14 PROCEDURE — 85025 COMPLETE CBC W/AUTO DIFF WBC: CPT

## 2025-01-14 PROCEDURE — 2580000003 HC RX 258: Performed by: THORACIC SURGERY (CARDIOTHORACIC VASCULAR SURGERY)

## 2025-01-14 PROCEDURE — 85014 HEMATOCRIT: CPT

## 2025-01-14 PROCEDURE — 83036 HEMOGLOBIN GLYCOSYLATED A1C: CPT

## 2025-01-14 PROCEDURE — 3600000008 HC SURGERY OHS BASE: Performed by: THORACIC SURGERY (CARDIOTHORACIC VASCULAR SURGERY)

## 2025-01-14 PROCEDURE — 3700000000 HC ANESTHESIA ATTENDED CARE: Performed by: THORACIC SURGERY (CARDIOTHORACIC VASCULAR SURGERY)

## 2025-01-14 PROCEDURE — 84295 ASSAY OF SERUM SODIUM: CPT

## 2025-01-14 PROCEDURE — 82803 BLOOD GASES ANY COMBINATION: CPT

## 2025-01-14 PROCEDURE — 83605 ASSAY OF LACTIC ACID: CPT

## 2025-01-14 PROCEDURE — 02100Z9 BYPASS CORONARY ARTERY, ONE ARTERY FROM LEFT INTERNAL MAMMARY, OPEN APPROACH: ICD-10-PCS | Performed by: THORACIC SURGERY (CARDIOTHORACIC VASCULAR SURGERY)

## 2025-01-14 PROCEDURE — 94761 N-INVAS EAR/PLS OXIMETRY MLT: CPT

## 2025-01-14 PROCEDURE — C1889 IMPLANT/INSERT DEVICE, NOC: HCPCS | Performed by: THORACIC SURGERY (CARDIOTHORACIC VASCULAR SURGERY)

## 2025-01-14 PROCEDURE — 83735 ASSAY OF MAGNESIUM: CPT

## 2025-01-14 PROCEDURE — 82947 ASSAY GLUCOSE BLOOD QUANT: CPT

## 2025-01-14 PROCEDURE — 2100000000 HC CCU R&B

## 2025-01-14 PROCEDURE — 94640 AIRWAY INHALATION TREATMENT: CPT

## 2025-01-14 PROCEDURE — B24BZZ4 ULTRASONOGRAPHY OF HEART WITH AORTA, TRANSESOPHAGEAL: ICD-10-PCS | Performed by: THORACIC SURGERY (CARDIOTHORACIC VASCULAR SURGERY)

## 2025-01-14 PROCEDURE — 2700000000 HC OXYGEN THERAPY PER DAY

## 2025-01-14 PROCEDURE — 021209W BYPASS CORONARY ARTERY, THREE ARTERIES FROM AORTA WITH AUTOLOGOUS VENOUS TISSUE, OPEN APPROACH: ICD-10-PCS | Performed by: THORACIC SURGERY (CARDIOTHORACIC VASCULAR SURGERY)

## 2025-01-14 PROCEDURE — 6360000002 HC RX W HCPCS: Performed by: THORACIC SURGERY (CARDIOTHORACIC VASCULAR SURGERY)

## 2025-01-14 PROCEDURE — 6360000002 HC RX W HCPCS: Performed by: INTERNAL MEDICINE

## 2025-01-14 PROCEDURE — 2720000010 HC SURG SUPPLY STERILE: Performed by: THORACIC SURGERY (CARDIOTHORACIC VASCULAR SURGERY)

## 2025-01-14 PROCEDURE — 85384 FIBRINOGEN ACTIVITY: CPT

## 2025-01-14 PROCEDURE — 85730 THROMBOPLASTIN TIME PARTIAL: CPT

## 2025-01-14 PROCEDURE — 3700000001 HC ADD 15 MINUTES (ANESTHESIA): Performed by: THORACIC SURGERY (CARDIOTHORACIC VASCULAR SURGERY)

## 2025-01-14 PROCEDURE — 82330 ASSAY OF CALCIUM: CPT

## 2025-01-14 PROCEDURE — 71045 X-RAY EXAM CHEST 1 VIEW: CPT

## 2025-01-14 PROCEDURE — 76942 ECHO GUIDE FOR BIOPSY: CPT | Performed by: ANESTHESIOLOGY

## 2025-01-14 PROCEDURE — C1713 ANCHOR/SCREW BN/BN,TIS/BN: HCPCS | Performed by: THORACIC SURGERY (CARDIOTHORACIC VASCULAR SURGERY)

## 2025-01-14 PROCEDURE — 7100000011 HC PHASE II RECOVERY - ADDTL 15 MIN

## 2025-01-14 PROCEDURE — 2500000003 HC RX 250 WO HCPCS

## 2025-01-14 PROCEDURE — 6370000000 HC RX 637 (ALT 250 FOR IP): Performed by: ANESTHESIOLOGY

## 2025-01-14 PROCEDURE — 2709999900 HC NON-CHARGEABLE SUPPLY: Performed by: THORACIC SURGERY (CARDIOTHORACIC VASCULAR SURGERY)

## 2025-01-14 PROCEDURE — 94010 BREATHING CAPACITY TEST: CPT | Performed by: INTERNAL MEDICINE

## 2025-01-14 PROCEDURE — 2580000003 HC RX 258: Performed by: ANESTHESIOLOGY

## 2025-01-14 PROCEDURE — 02L70CK OCCLUSION OF LEFT ATRIAL APPENDAGE WITH EXTRALUMINAL DEVICE, OPEN APPROACH: ICD-10-PCS | Performed by: THORACIC SURGERY (CARDIOTHORACIC VASCULAR SURGERY)

## 2025-01-14 PROCEDURE — 84132 ASSAY OF SERUM POTASSIUM: CPT

## 2025-01-14 PROCEDURE — 80061 LIPID PANEL: CPT

## 2025-01-14 PROCEDURE — 2500000003 HC RX 250 WO HCPCS: Performed by: THORACIC SURGERY (CARDIOTHORACIC VASCULAR SURGERY)

## 2025-01-14 PROCEDURE — 06BP4ZZ EXCISION OF RIGHT SAPHENOUS VEIN, PERCUTANEOUS ENDOSCOPIC APPROACH: ICD-10-PCS | Performed by: THORACIC SURGERY (CARDIOTHORACIC VASCULAR SURGERY)

## 2025-01-14 PROCEDURE — P9045 ALBUMIN (HUMAN), 5%, 250 ML: HCPCS | Performed by: ANESTHESIOLOGY

## 2025-01-14 DEVICE — APPLIER CLIP MED SUTURE LESS 45 MM SYS 1 HND STRL ATRICLIP FLX V: Type: IMPLANTABLE DEVICE | Site: HEART | Status: FUNCTIONAL

## 2025-01-14 DEVICE — IMPLANTABLE DEVICE: Type: IMPLANTABLE DEVICE | Site: CHEST | Status: FUNCTIONAL

## 2025-01-14 DEVICE — TEMP PACING WIRE
Type: IMPLANTABLE DEVICE | Site: HEART | Status: FUNCTIONAL
Brand: MYO/WIRE

## 2025-01-14 DEVICE — KIT STRNL CLOSURE X CABLE PLATE KT INCLUDE SCREW CABLE STRL: Type: IMPLANTABLE DEVICE | Site: CHEST | Status: FUNCTIONAL

## 2025-01-14 DEVICE — SCREW BNE 2.7X10 MM THORECON: Type: IMPLANTABLE DEVICE | Site: CHEST | Status: FUNCTIONAL

## 2025-01-14 RX ORDER — ALBUTEROL SULFATE 0.83 MG/ML
2.5 SOLUTION RESPIRATORY (INHALATION)
Status: DISCONTINUED | OUTPATIENT
Start: 2025-01-14 | End: 2025-01-20

## 2025-01-14 RX ORDER — SODIUM CHLORIDE 0.9 % (FLUSH) 0.9 %
5-40 SYRINGE (ML) INJECTION EVERY 12 HOURS SCHEDULED
Status: DISCONTINUED | OUTPATIENT
Start: 2025-01-14 | End: 2025-01-21 | Stop reason: HOSPADM

## 2025-01-14 RX ORDER — ASPIRIN 81 MG/1
81 TABLET, CHEWABLE ORAL DAILY
Status: DISCONTINUED | OUTPATIENT
Start: 2025-01-15 | End: 2025-01-21 | Stop reason: HOSPADM

## 2025-01-14 RX ORDER — POLYETHYLENE GLYCOL 3350 17 G/17G
17 POWDER, FOR SOLUTION ORAL DAILY
Status: DISCONTINUED | OUTPATIENT
Start: 2025-01-15 | End: 2025-01-21 | Stop reason: HOSPADM

## 2025-01-14 RX ORDER — ONDANSETRON 2 MG/ML
4 INJECTION INTRAMUSCULAR; INTRAVENOUS EVERY 6 HOURS PRN
Status: DISCONTINUED | OUTPATIENT
Start: 2025-01-14 | End: 2025-01-21 | Stop reason: HOSPADM

## 2025-01-14 RX ORDER — PROTAMINE SULFATE 10 MG/ML
50 INJECTION, SOLUTION INTRAVENOUS
Status: DISCONTINUED | OUTPATIENT
Start: 2025-01-14 | End: 2025-01-15 | Stop reason: ALTCHOICE

## 2025-01-14 RX ORDER — ALBUMIN HUMAN 50 G/1000ML
25 SOLUTION INTRAVENOUS PRN
Status: DISPENSED | OUTPATIENT
Start: 2025-01-14 | End: 2025-01-16

## 2025-01-14 RX ORDER — NITROGLYCERIN 5 MG/ML
INJECTION, SOLUTION INTRAVENOUS
Status: DISCONTINUED | OUTPATIENT
Start: 2025-01-14 | End: 2025-01-14 | Stop reason: SDUPTHER

## 2025-01-14 RX ORDER — GLYCOPYRROLATE 0.2 MG/ML
INJECTION INTRAMUSCULAR; INTRAVENOUS
Status: DISCONTINUED | OUTPATIENT
Start: 2025-01-14 | End: 2025-01-14 | Stop reason: SDUPTHER

## 2025-01-14 RX ORDER — METOPROLOL TARTRATE 1 MG/ML
2.5 INJECTION, SOLUTION INTRAVENOUS EVERY 10 MIN PRN
Status: DISCONTINUED | OUTPATIENT
Start: 2025-01-14 | End: 2025-01-21 | Stop reason: HOSPADM

## 2025-01-14 RX ORDER — ONDANSETRON 4 MG/1
4 TABLET, ORALLY DISINTEGRATING ORAL EVERY 8 HOURS PRN
Status: DISCONTINUED | OUTPATIENT
Start: 2025-01-14 | End: 2025-01-21 | Stop reason: HOSPADM

## 2025-01-14 RX ORDER — CLOPIDOGREL BISULFATE 75 MG/1
75 TABLET ORAL DAILY
Status: DISCONTINUED | OUTPATIENT
Start: 2025-01-15 | End: 2025-01-21 | Stop reason: HOSPADM

## 2025-01-14 RX ORDER — ALBUMIN HUMAN 50 G/1000ML
SOLUTION INTRAVENOUS
Status: DISCONTINUED | OUTPATIENT
Start: 2025-01-14 | End: 2025-01-14 | Stop reason: SDUPTHER

## 2025-01-14 RX ORDER — METOPROLOL TARTRATE 25 MG/1
12.5 TABLET, FILM COATED ORAL 2 TIMES DAILY
Status: DISCONTINUED | OUTPATIENT
Start: 2025-01-15 | End: 2025-01-18

## 2025-01-14 RX ORDER — FUROSEMIDE 40 MG/1
40 TABLET ORAL 2 TIMES DAILY
Status: DISCONTINUED | OUTPATIENT
Start: 2025-01-17 | End: 2025-01-16

## 2025-01-14 RX ORDER — MORPHINE SULFATE 4 MG/ML
4 INJECTION, SOLUTION INTRAMUSCULAR; INTRAVENOUS
Status: DISCONTINUED | OUTPATIENT
Start: 2025-01-14 | End: 2025-01-21

## 2025-01-14 RX ORDER — BUPIVACAINE HYDROCHLORIDE 5 MG/ML
INJECTION, SOLUTION EPIDURAL; INTRACAUDAL
Status: COMPLETED | OUTPATIENT
Start: 2025-01-14 | End: 2025-01-14

## 2025-01-14 RX ORDER — SODIUM CHLORIDE 9 MG/ML
INJECTION, SOLUTION INTRAVENOUS PRN
Status: DISCONTINUED | OUTPATIENT
Start: 2025-01-14 | End: 2025-01-21 | Stop reason: HOSPADM

## 2025-01-14 RX ORDER — FONDAPARINUX SODIUM 2.5 MG/.5ML
2.5 INJECTION SUBCUTANEOUS DAILY
Status: DISCONTINUED | OUTPATIENT
Start: 2025-01-15 | End: 2025-01-15

## 2025-01-14 RX ORDER — INSULIN LISPRO 100 [IU]/ML
0-12 INJECTION, SOLUTION INTRAVENOUS; SUBCUTANEOUS
Status: DISCONTINUED | OUTPATIENT
Start: 2025-01-17 | End: 2025-01-21 | Stop reason: HOSPADM

## 2025-01-14 RX ORDER — MORPHINE SULFATE 2 MG/ML
2 INJECTION, SOLUTION INTRAMUSCULAR; INTRAVENOUS
Status: DISCONTINUED | OUTPATIENT
Start: 2025-01-14 | End: 2025-01-21

## 2025-01-14 RX ORDER — MIDAZOLAM HYDROCHLORIDE 1 MG/ML
INJECTION, SOLUTION INTRAMUSCULAR; INTRAVENOUS
Status: DISCONTINUED | OUTPATIENT
Start: 2025-01-14 | End: 2025-01-14 | Stop reason: SDUPTHER

## 2025-01-14 RX ORDER — HEPARIN SODIUM 1000 [USP'U]/ML
INJECTION, SOLUTION INTRAVENOUS; SUBCUTANEOUS
Status: DISCONTINUED | OUTPATIENT
Start: 2025-01-14 | End: 2025-01-14 | Stop reason: SDUPTHER

## 2025-01-14 RX ORDER — SODIUM CHLORIDE 0.9 % (FLUSH) 0.9 %
5-40 SYRINGE (ML) INJECTION PRN
Status: DISCONTINUED | OUTPATIENT
Start: 2025-01-14 | End: 2025-01-21 | Stop reason: HOSPADM

## 2025-01-14 RX ORDER — NITROGLYCERIN 20 MG/100ML
5-200 INJECTION INTRAVENOUS CONTINUOUS
Status: DISCONTINUED | OUTPATIENT
Start: 2025-01-14 | End: 2025-01-15 | Stop reason: ALTCHOICE

## 2025-01-14 RX ORDER — MAGNESIUM SULFATE IN WATER 40 MG/ML
2000 INJECTION, SOLUTION INTRAVENOUS PRN
Status: DISCONTINUED | OUTPATIENT
Start: 2025-01-14 | End: 2025-01-21 | Stop reason: HOSPADM

## 2025-01-14 RX ORDER — DEXTROSE MONOHYDRATE AND SODIUM CHLORIDE 5; .45 G/100ML; G/100ML
INJECTION, SOLUTION INTRAVENOUS CONTINUOUS
Status: DISCONTINUED | OUTPATIENT
Start: 2025-01-14 | End: 2025-01-15 | Stop reason: ALTCHOICE

## 2025-01-14 RX ORDER — CEFAZOLIN SODIUM 1 G/3ML
INJECTION, POWDER, FOR SOLUTION INTRAMUSCULAR; INTRAVENOUS PRN
Status: DISCONTINUED | OUTPATIENT
Start: 2025-01-14 | End: 2025-01-14 | Stop reason: ALTCHOICE

## 2025-01-14 RX ORDER — CHLORHEXIDINE GLUCONATE ORAL RINSE 1.2 MG/ML
15 SOLUTION DENTAL 2 TIMES DAILY
Status: DISCONTINUED | OUTPATIENT
Start: 2025-01-14 | End: 2025-01-21 | Stop reason: HOSPADM

## 2025-01-14 RX ORDER — POTASSIUM CHLORIDE 750 MG/1
10 TABLET, EXTENDED RELEASE ORAL
Status: DISCONTINUED | OUTPATIENT
Start: 2025-01-15 | End: 2025-01-21 | Stop reason: HOSPADM

## 2025-01-14 RX ORDER — FUROSEMIDE 10 MG/ML
20 INJECTION INTRAMUSCULAR; INTRAVENOUS EVERY 6 HOURS
Status: DISPENSED | OUTPATIENT
Start: 2025-01-15 | End: 2025-01-17

## 2025-01-14 RX ORDER — SODIUM CHLORIDE, SODIUM GLUCONATE, SODIUM ACETATE, POTASSIUM CHLORIDE AND MAGNESIUM CHLORIDE 526; 502; 368; 37; 30 MG/100ML; MG/100ML; MG/100ML; MG/100ML; MG/100ML
1000 INJECTION, SOLUTION INTRAVENOUS CONTINUOUS
Status: DISCONTINUED | OUTPATIENT
Start: 2025-01-14 | End: 2025-01-14

## 2025-01-14 RX ORDER — FAMOTIDINE 20 MG/1
20 TABLET, FILM COATED ORAL 2 TIMES DAILY
Status: DISCONTINUED | OUTPATIENT
Start: 2025-01-14 | End: 2025-01-15

## 2025-01-14 RX ORDER — DEXAMETHASONE SODIUM PHOSPHATE 4 MG/ML
INJECTION, SOLUTION INTRA-ARTICULAR; INTRALESIONAL; INTRAMUSCULAR; INTRAVENOUS; SOFT TISSUE
Status: DISCONTINUED | OUTPATIENT
Start: 2025-01-14 | End: 2025-01-14 | Stop reason: SDUPTHER

## 2025-01-14 RX ORDER — MEPERIDINE HYDROCHLORIDE 50 MG/ML
25 INJECTION INTRAMUSCULAR; INTRAVENOUS; SUBCUTANEOUS
Status: DISCONTINUED | OUTPATIENT
Start: 2025-01-14 | End: 2025-01-15 | Stop reason: ALTCHOICE

## 2025-01-14 RX ORDER — PROTAMINE SULFATE 10 MG/ML
INJECTION, SOLUTION INTRAVENOUS
Status: DISCONTINUED | OUTPATIENT
Start: 2025-01-14 | End: 2025-01-14 | Stop reason: SDUPTHER

## 2025-01-14 RX ORDER — LANOLIN ALCOHOL/MO/W.PET/CERES
400 CREAM (GRAM) TOPICAL 2 TIMES DAILY
Status: DISCONTINUED | OUTPATIENT
Start: 2025-01-15 | End: 2025-01-21 | Stop reason: HOSPADM

## 2025-01-14 RX ORDER — VANCOMYCIN 1.5 G/300ML
1500 INJECTION, SOLUTION INTRAVENOUS EVERY 12 HOURS
Status: DISCONTINUED | OUTPATIENT
Start: 2025-01-14 | End: 2025-01-15

## 2025-01-14 RX ORDER — NOREPINEPHRINE BITARTRATE 1 MG/ML
INJECTION, SOLUTION INTRAVENOUS
Status: DISCONTINUED | OUTPATIENT
Start: 2025-01-14 | End: 2025-01-14 | Stop reason: SDUPTHER

## 2025-01-14 RX ORDER — DEXMEDETOMIDINE HYDROCHLORIDE 4 UG/ML
.1-1.5 INJECTION, SOLUTION INTRAVENOUS CONTINUOUS
Status: DISCONTINUED | OUTPATIENT
Start: 2025-01-14 | End: 2025-01-15

## 2025-01-14 RX ORDER — OXYCODONE HYDROCHLORIDE 5 MG/1
5 TABLET ORAL EVERY 4 HOURS PRN
Status: DISCONTINUED | OUTPATIENT
Start: 2025-01-14 | End: 2025-01-21 | Stop reason: HOSPADM

## 2025-01-14 RX ORDER — DEXTROSE MONOHYDRATE 100 MG/ML
INJECTION, SOLUTION INTRAVENOUS CONTINUOUS PRN
Status: DISCONTINUED | OUTPATIENT
Start: 2025-01-14 | End: 2025-01-21 | Stop reason: HOSPADM

## 2025-01-14 RX ORDER — ROCURONIUM BROMIDE 10 MG/ML
INJECTION, SOLUTION INTRAVENOUS
Status: DISCONTINUED | OUTPATIENT
Start: 2025-01-14 | End: 2025-01-14 | Stop reason: SDUPTHER

## 2025-01-14 RX ORDER — ASPIRIN 325 MG
325 TABLET, DELAYED RELEASE (ENTERIC COATED) ORAL ONCE
Status: DISCONTINUED | OUTPATIENT
Start: 2025-01-14 | End: 2025-01-16

## 2025-01-14 RX ORDER — ATORVASTATIN CALCIUM 40 MG/1
40 TABLET, FILM COATED ORAL NIGHTLY
Status: DISCONTINUED | OUTPATIENT
Start: 2025-01-15 | End: 2025-01-21 | Stop reason: HOSPADM

## 2025-01-14 RX ORDER — GLUCAGON 1 MG/ML
1 KIT INJECTION PRN
Status: DISCONTINUED | OUTPATIENT
Start: 2025-01-14 | End: 2025-01-21 | Stop reason: HOSPADM

## 2025-01-14 RX ORDER — BISACODYL 10 MG
10 SUPPOSITORY, RECTAL RECTAL DAILY PRN
Status: DISCONTINUED | OUTPATIENT
Start: 2025-01-14 | End: 2025-01-21 | Stop reason: HOSPADM

## 2025-01-14 RX ORDER — INSULIN GLARGINE 100 [IU]/ML
0.15 INJECTION, SOLUTION SUBCUTANEOUS NIGHTLY
Status: DISCONTINUED | OUTPATIENT
Start: 2025-01-16 | End: 2025-01-21 | Stop reason: HOSPADM

## 2025-01-14 RX ORDER — VANCOMYCIN HYDROCHLORIDE 1 G/20ML
INJECTION, POWDER, LYOPHILIZED, FOR SOLUTION INTRAVENOUS PRN
Status: DISCONTINUED | OUTPATIENT
Start: 2025-01-14 | End: 2025-01-14 | Stop reason: ALTCHOICE

## 2025-01-14 RX ORDER — OXYCODONE HYDROCHLORIDE 5 MG/1
10 TABLET ORAL EVERY 4 HOURS PRN
Status: DISCONTINUED | OUTPATIENT
Start: 2025-01-14 | End: 2025-01-21 | Stop reason: HOSPADM

## 2025-01-14 RX ORDER — HYDRALAZINE HYDROCHLORIDE 20 MG/ML
5 INJECTION INTRAMUSCULAR; INTRAVENOUS EVERY 5 MIN PRN
Status: DISCONTINUED | OUTPATIENT
Start: 2025-01-14 | End: 2025-01-21 | Stop reason: HOSPADM

## 2025-01-14 RX ORDER — POTASSIUM CHLORIDE 29.8 MG/ML
20 INJECTION INTRAVENOUS PRN
Status: DISCONTINUED | OUTPATIENT
Start: 2025-01-14 | End: 2025-01-21

## 2025-01-14 RX ORDER — NOREPINEPHRINE BITARTRATE 0.06 MG/ML
1-30 INJECTION, SOLUTION INTRAVENOUS CONTINUOUS PRN
Status: DISCONTINUED | OUTPATIENT
Start: 2025-01-14 | End: 2025-01-19

## 2025-01-14 RX ORDER — AMINOCAPROIC ACID 250 MG/ML
INJECTION, SOLUTION INTRAVENOUS
Status: DISCONTINUED | OUTPATIENT
Start: 2025-01-14 | End: 2025-01-14 | Stop reason: SDUPTHER

## 2025-01-14 RX ORDER — BISACODYL 5 MG/1
5 TABLET, DELAYED RELEASE ORAL DAILY
Status: DISCONTINUED | OUTPATIENT
Start: 2025-01-15 | End: 2025-01-21 | Stop reason: HOSPADM

## 2025-01-14 RX ORDER — SODIUM CHLORIDE, SODIUM LACTATE, POTASSIUM CHLORIDE, CALCIUM CHLORIDE 600; 310; 30; 20 MG/100ML; MG/100ML; MG/100ML; MG/100ML
INJECTION, SOLUTION INTRAVENOUS
Status: DISCONTINUED | OUTPATIENT
Start: 2025-01-14 | End: 2025-01-14 | Stop reason: SDUPTHER

## 2025-01-14 RX ORDER — ACETAMINOPHEN 500 MG
1000 TABLET ORAL EVERY 6 HOURS SCHEDULED
Status: DISCONTINUED | OUTPATIENT
Start: 2025-01-15 | End: 2025-01-21 | Stop reason: HOSPADM

## 2025-01-14 RX ORDER — DEXMEDETOMIDINE HYDROCHLORIDE 4 UG/ML
.1-1.5 INJECTION, SOLUTION INTRAVENOUS CONTINUOUS
Status: DISCONTINUED | OUTPATIENT
Start: 2025-01-14 | End: 2025-01-14 | Stop reason: HOSPADM

## 2025-01-14 RX ORDER — SODIUM CHLORIDE 9 MG/ML
INJECTION, SOLUTION INTRAVENOUS
Status: DISCONTINUED | OUTPATIENT
Start: 2025-01-14 | End: 2025-01-14 | Stop reason: SDUPTHER

## 2025-01-14 RX ORDER — FENTANYL CITRATE 0.05 MG/ML
INJECTION, SOLUTION INTRAMUSCULAR; INTRAVENOUS
Status: DISCONTINUED | OUTPATIENT
Start: 2025-01-14 | End: 2025-01-14 | Stop reason: SDUPTHER

## 2025-01-14 RX ORDER — NOREPINEPHRINE BITARTRATE 0.06 MG/ML
1-100 INJECTION, SOLUTION INTRAVENOUS CONTINUOUS
Status: DISCONTINUED | OUTPATIENT
Start: 2025-01-14 | End: 2025-01-14 | Stop reason: HOSPADM

## 2025-01-14 RX ORDER — MIDAZOLAM HYDROCHLORIDE 1 MG/ML
1 INJECTION, SOLUTION INTRAMUSCULAR; INTRAVENOUS
Status: DISCONTINUED | OUTPATIENT
Start: 2025-01-14 | End: 2025-01-15 | Stop reason: ALTCHOICE

## 2025-01-14 RX ORDER — ETOMIDATE 2 MG/ML
INJECTION, SOLUTION INTRAVENOUS
Status: DISCONTINUED | OUTPATIENT
Start: 2025-01-14 | End: 2025-01-14 | Stop reason: SDUPTHER

## 2025-01-14 RX ORDER — MUPIROCIN 20 MG/G
OINTMENT TOPICAL 2 TIMES DAILY
Status: DISCONTINUED | OUTPATIENT
Start: 2025-01-14 | End: 2025-01-19

## 2025-01-14 RX ORDER — KETAMINE HYDROCHLORIDE 100 MG/ML
INJECTION, SOLUTION INTRAMUSCULAR; INTRAVENOUS
Status: DISCONTINUED | OUTPATIENT
Start: 2025-01-14 | End: 2025-01-14 | Stop reason: SDUPTHER

## 2025-01-14 RX ADMIN — FENTANYL CITRATE 50 MCG: 50 INJECTION, SOLUTION INTRAMUSCULAR; INTRAVENOUS at 08:30

## 2025-01-14 RX ADMIN — HYDRALAZINE HYDROCHLORIDE 5 MG: 20 INJECTION INTRAMUSCULAR; INTRAVENOUS at 11:16

## 2025-01-14 RX ADMIN — MORPHINE SULFATE 2 MG: 2 INJECTION, SOLUTION INTRAMUSCULAR; INTRAVENOUS at 19:57

## 2025-01-14 RX ADMIN — ROCURONIUM BROMIDE 100 MG: 50 INJECTION, SOLUTION INTRAVENOUS at 07:42

## 2025-01-14 RX ADMIN — HEPARIN SODIUM 32000 UNITS: 1000 INJECTION, SOLUTION INTRAVENOUS; SUBCUTANEOUS at 09:31

## 2025-01-14 RX ADMIN — GLYCOPYRROLATE 0.4 MG: 0.2 INJECTION, SOLUTION INTRAMUSCULAR; INTRAVENOUS at 08:48

## 2025-01-14 RX ADMIN — ETOMIDATE 18 MG: 2 INJECTION INTRAVENOUS at 07:41

## 2025-01-14 RX ADMIN — NOREPINEPHRINE BITARTRATE 16 MCG: 1 INJECTION INTRAVENOUS at 13:25

## 2025-01-14 RX ADMIN — INSULIN HUMAN 3 UNITS: 100 INJECTION, SOLUTION PARENTERAL at 12:15

## 2025-01-14 RX ADMIN — ALBUMIN (HUMAN) 12.5 G: 2.5 SOLUTION INTRAVENOUS at 20:33

## 2025-01-14 RX ADMIN — ALBUMIN (HUMAN) 12.5 G: 2.5 SOLUTION INTRAVENOUS at 14:59

## 2025-01-14 RX ADMIN — MORPHINE SULFATE 4 MG: 4 INJECTION, SOLUTION INTRAMUSCULAR; INTRAVENOUS at 22:09

## 2025-01-14 RX ADMIN — FENTANYL CITRATE 50 MCG: 50 INJECTION, SOLUTION INTRAMUSCULAR; INTRAVENOUS at 13:49

## 2025-01-14 RX ADMIN — FENTANYL CITRATE 50 MCG: 50 INJECTION, SOLUTION INTRAMUSCULAR; INTRAVENOUS at 08:51

## 2025-01-14 RX ADMIN — MUPIROCIN: 20 OINTMENT TOPICAL at 20:56

## 2025-01-14 RX ADMIN — FENTANYL CITRATE 50 MCG: 50 INJECTION, SOLUTION INTRAMUSCULAR; INTRAVENOUS at 08:54

## 2025-01-14 RX ADMIN — MIDAZOLAM 2 MG: 1 INJECTION INTRAMUSCULAR; INTRAVENOUS at 14:09

## 2025-01-14 RX ADMIN — CEFAZOLIN 2000 MG: 2 INJECTION, POWDER, FOR SOLUTION INTRAVENOUS at 11:50

## 2025-01-14 RX ADMIN — SODIUM BICARBONATE 50 MEQ: 84 INJECTION INTRAVENOUS at 16:30

## 2025-01-14 RX ADMIN — SUGAMMADEX 200 MG: 100 INJECTION, SOLUTION INTRAVENOUS at 14:50

## 2025-01-14 RX ADMIN — CHLORHEXIDINE GLUCONATE 15 ML: 1.2 RINSE ORAL at 20:57

## 2025-01-14 RX ADMIN — FENTANYL CITRATE 200 MCG: 50 INJECTION, SOLUTION INTRAMUSCULAR; INTRAVENOUS at 07:41

## 2025-01-14 RX ADMIN — BUPIVACAINE HYDROCHLORIDE 20 ML: 5 INJECTION, SOLUTION EPIDURAL; INTRACAUDAL; PERINEURAL at 07:49

## 2025-01-14 RX ADMIN — CHLORHEXIDINE GLUCONATE 118 ML: 40 SOLUTION TOPICAL at 04:15

## 2025-01-14 RX ADMIN — PROTAMINE SULFATE 350 MG: 10 INJECTION, SOLUTION INTRAVENOUS at 13:17

## 2025-01-14 RX ADMIN — SODIUM BICARBONATE 50 MEQ: 84 INJECTION INTRAVENOUS at 20:11

## 2025-01-14 RX ADMIN — POTASSIUM CHLORIDE 20 MEQ: 29.8 INJECTION, SOLUTION INTRAVENOUS at 14:59

## 2025-01-14 RX ADMIN — NITROGLYCERIN 5 MCG/MIN: 20 INJECTION INTRAVENOUS at 15:02

## 2025-01-14 RX ADMIN — FENTANYL CITRATE 100 MCG: 50 INJECTION, SOLUTION INTRAMUSCULAR; INTRAVENOUS at 08:50

## 2025-01-14 RX ADMIN — CEFAZOLIN 2000 MG: 2 INJECTION, POWDER, FOR SOLUTION INTRAVENOUS at 07:50

## 2025-01-14 RX ADMIN — DEXAMETHASONE SODIUM PHOSPHATE 4 MG: 4 INJECTION, SOLUTION INTRAMUSCULAR; INTRAVENOUS at 13:55

## 2025-01-14 RX ADMIN — NOREPINEPHRINE BITARTRATE 8 MCG: 1 INJECTION INTRAVENOUS at 08:25

## 2025-01-14 RX ADMIN — VANCOMYCIN 1500 MG: 1.5 INJECTION, SOLUTION INTRAVENOUS at 07:50

## 2025-01-14 RX ADMIN — NOREPINEPHRINE BITARTRATE 8 MCG: 1 INJECTION INTRAVENOUS at 10:03

## 2025-01-14 RX ADMIN — NOREPINEPHRINE BITARTRATE 12 MCG/MIN: 64 SOLUTION INTRAVENOUS at 15:07

## 2025-01-14 RX ADMIN — ALBUTEROL SULFATE 2.5 MG: 2.5 SOLUTION RESPIRATORY (INHALATION) at 14:59

## 2025-01-14 RX ADMIN — ROCURONIUM BROMIDE 50 MG: 50 INJECTION, SOLUTION INTRAVENOUS at 13:11

## 2025-01-14 RX ADMIN — SODIUM CHLORIDE: 9 INJECTION, SOLUTION INTRAVENOUS at 00:31

## 2025-01-14 RX ADMIN — DEXTROSE AND SODIUM CHLORIDE: 5; .45 INJECTION, SOLUTION INTRAVENOUS at 22:22

## 2025-01-14 RX ADMIN — MIDAZOLAM 4 MG: 1 INJECTION INTRAMUSCULAR; INTRAVENOUS at 07:38

## 2025-01-14 RX ADMIN — BUPIVACAINE 20 ML: 13.3 INJECTION, SUSPENSION, LIPOSOMAL INFILTRATION at 07:49

## 2025-01-14 RX ADMIN — ONDANSETRON 4 MG: 2 INJECTION INTRAMUSCULAR; INTRAVENOUS at 13:55

## 2025-01-14 RX ADMIN — NOREPINEPHRINE BITARTRATE 16 MCG: 1 INJECTION INTRAVENOUS at 13:38

## 2025-01-14 RX ADMIN — FENTANYL CITRATE 50 MCG: 50 INJECTION, SOLUTION INTRAMUSCULAR; INTRAVENOUS at 14:10

## 2025-01-14 RX ADMIN — NOREPINEPHRINE BITARTRATE 16 MCG: 1 INJECTION INTRAVENOUS at 13:36

## 2025-01-14 RX ADMIN — FAMOTIDINE 20 MG: 10 INJECTION, SOLUTION INTRAVENOUS at 20:24

## 2025-01-14 RX ADMIN — FENTANYL CITRATE 100 MCG: 50 INJECTION, SOLUTION INTRAMUSCULAR; INTRAVENOUS at 09:03

## 2025-01-14 RX ADMIN — SODIUM BICARBONATE 50 MEQ: 84 INJECTION, SOLUTION INTRAVENOUS at 13:25

## 2025-01-14 RX ADMIN — VANCOMYCIN 1500 MG: 1.5 INJECTION, SOLUTION INTRAVENOUS at 21:01

## 2025-01-14 RX ADMIN — WATER 2000 MG: 1 INJECTION INTRAMUSCULAR; INTRAVENOUS; SUBCUTANEOUS at 20:24

## 2025-01-14 RX ADMIN — NOREPINEPHRINE BITARTRATE 8 MCG: 1 INJECTION INTRAVENOUS at 08:01

## 2025-01-14 RX ADMIN — KETAMINE HYDROCHLORIDE 50 MG: 100 INJECTION INTRAMUSCULAR; INTRAVENOUS at 07:41

## 2025-01-14 RX ADMIN — MIDAZOLAM HYDROCHLORIDE 1 MG: 1 INJECTION, SOLUTION INTRAMUSCULAR; INTRAVENOUS at 16:00

## 2025-01-14 RX ADMIN — AMINOCAPROIC ACID 5000 MG: 250 INJECTION, SOLUTION INTRAVENOUS at 07:59

## 2025-01-14 RX ADMIN — ROCURONIUM BROMIDE 50 MG: 50 INJECTION, SOLUTION INTRAVENOUS at 09:38

## 2025-01-14 RX ADMIN — HYDRALAZINE HYDROCHLORIDE 5 MG: 20 INJECTION INTRAMUSCULAR; INTRAVENOUS at 11:09

## 2025-01-14 RX ADMIN — ALBUMIN (HUMAN) 12.5 G: 2.5 SOLUTION INTRAVENOUS at 22:36

## 2025-01-14 RX ADMIN — FENTANYL CITRATE 100 MCG: 50 INJECTION, SOLUTION INTRAMUSCULAR; INTRAVENOUS at 08:32

## 2025-01-14 RX ADMIN — ALBUMIN (HUMAN) 12.5 G: 12.5 INJECTION, SOLUTION INTRAVENOUS at 13:41

## 2025-01-14 RX ADMIN — SODIUM CHLORIDE, PRESERVATIVE FREE 10 ML: 5 INJECTION INTRAVENOUS at 20:58

## 2025-01-14 RX ADMIN — NOREPINEPHRINE BITARTRATE 2 MCG/MIN: 0.06 INJECTION, SOLUTION INTRAVENOUS at 13:29

## 2025-01-14 RX ADMIN — FENTANYL CITRATE 100 MCG: 50 INJECTION, SOLUTION INTRAMUSCULAR; INTRAVENOUS at 13:18

## 2025-01-14 RX ADMIN — CHLORHEXIDINE GLUCONATE 15 ML: 1.2 RINSE ORAL at 04:15

## 2025-01-14 RX ADMIN — NITROGLYCERIN 50 MCG: 5 INJECTION, SOLUTION INTRAVENOUS at 13:17

## 2025-01-14 RX ADMIN — MIDAZOLAM 2 MG: 1 INJECTION INTRAMUSCULAR; INTRAVENOUS at 09:38

## 2025-01-14 RX ADMIN — KETAMINE HYDROCHLORIDE 50 MG: 100 INJECTION INTRAMUSCULAR; INTRAVENOUS at 13:50

## 2025-01-14 RX ADMIN — ACETAMINOPHEN 1000 MG: 500 TABLET ORAL at 04:13

## 2025-01-14 RX ADMIN — SODIUM CHLORIDE: 9 INJECTION, SOLUTION INTRAVENOUS at 08:00

## 2025-01-14 RX ADMIN — ASPIRIN 81 MG: 81 TABLET, COATED ORAL at 04:14

## 2025-01-14 RX ADMIN — Medication 2 AMPULE: at 07:00

## 2025-01-14 RX ADMIN — SODIUM CHLORIDE 3 UNITS/HR: 9 INJECTION, SOLUTION INTRAVENOUS at 11:20

## 2025-01-14 RX ADMIN — FENTANYL CITRATE 100 MCG: 50 INJECTION, SOLUTION INTRAMUSCULAR; INTRAVENOUS at 08:58

## 2025-01-14 RX ADMIN — FENTANYL CITRATE 50 MCG: 50 INJECTION, SOLUTION INTRAMUSCULAR; INTRAVENOUS at 13:47

## 2025-01-14 RX ADMIN — Medication 0.4 MCG/KG/HR: at 09:28

## 2025-01-14 RX ADMIN — SODIUM CHLORIDE, SODIUM LACTATE, POTASSIUM CHLORIDE, AND CALCIUM CHLORIDE: .6; .31; .03; .02 INJECTION, SOLUTION INTRAVENOUS at 07:35

## 2025-01-14 RX ADMIN — ALBUTEROL SULFATE 2.5 MG: 2.5 SOLUTION RESPIRATORY (INHALATION) at 20:58

## 2025-01-14 RX ADMIN — NOREPINEPHRINE BITARTRATE 16 MCG: 1 INJECTION INTRAVENOUS at 09:42

## 2025-01-14 RX ADMIN — AMINOCAPROIC ACID 1000 MG/HR: 250 INJECTION, SOLUTION INTRAVENOUS at 08:29

## 2025-01-14 ASSESSMENT — PAIN DESCRIPTION - ONSET: ONSET: ON-GOING

## 2025-01-14 ASSESSMENT — PAIN SCALES - GENERAL
PAINLEVEL_OUTOF10: 8
PAINLEVEL_OUTOF10: 8
PAINLEVEL_OUTOF10: 7
PAINLEVEL_OUTOF10: 6

## 2025-01-14 ASSESSMENT — PAIN DESCRIPTION - DESCRIPTORS: DESCRIPTORS: ACHING;SORE

## 2025-01-14 ASSESSMENT — PULMONARY FUNCTION TESTS
PIF_VALUE: 16
PIF_VALUE: 16

## 2025-01-14 ASSESSMENT — PAIN DESCRIPTION - LOCATION
LOCATION: STERNUM
LOCATION: CHEST

## 2025-01-14 ASSESSMENT — PAIN DESCRIPTION - ORIENTATION: ORIENTATION: MID

## 2025-01-14 ASSESSMENT — PAIN DESCRIPTION - PAIN TYPE: TYPE: SURGICAL PAIN

## 2025-01-14 ASSESSMENT — PAIN - FUNCTIONAL ASSESSMENT: PAIN_FUNCTIONAL_ASSESSMENT: ACTIVITIES ARE NOT PREVENTED

## 2025-01-14 NOTE — ANESTHESIA PROCEDURE NOTES
Peripheral Block    Patient location during procedure: OR  Reason for block: post-op pain management and at surgeon's request  Start time: 1/14/2025 7:49 AM  End time: 1/14/2025 7:52 AM  Staffing  Performed: anesthesiologist   Anesthesiologist: Jf Gomez MD  Performed by: Jf Gomez MD  Authorized by: Jf Gomez MD    Preanesthetic Checklist  Completed: patient identified, IV checked, site marked, risks and benefits discussed, surgical/procedural consents, equipment checked, pre-op evaluation, timeout performed, anesthesia consent given, oxygen available and monitors applied/VS acknowledged  Peripheral Block   Patient position: sitting  Prep: ChloraPrep  Provider prep: mask and sterile gloves  Patient monitoring: cardiac monitor, continuous pulse ox, frequent blood pressure checks and IV access  Block type: PECS II  Laterality: bilateral  Injection technique: single-shot  Guidance: ultrasound guided  Local infiltration: lidocaine  Infiltration strength: 1 %  Local infiltration: lidocaine  Dose: 3 mL    Needle   Needle type: insulated echogenic nerve stimulator needle   Needle gauge: 21 G  Needle localization: ultrasound guidance  Needle length: 10 cm  Assessment   Injection assessment: negative aspiration for heme, no paresthesia on injection and local visualized surrounding nerve on ultrasound  Paresthesia pain: none  Slow fractionated injection: yes  Hemodynamics: stable  Outcomes: patient tolerated procedure well    Medications Administered  BUPivacaine (MARCAINE) PF injection 0.5% - Perineural   20 mL - 1/14/2025 7:49:00 AM  BUPivacaine liposome (EXPAREL) injection 1.3% - Perineural   20 mL - 1/14/2025 7:49:00 AM

## 2025-01-14 NOTE — ANESTHESIA POSTPROCEDURE EVALUATION
Department of Anesthesiology  Postprocedure Note    Patient: Dawood Epps  MRN: [id number]  YOB: 1955  Date of evaluation: 1/14/2025    Procedure Summary       Date: 01/14/25 Room / Location: [place] [address] / [facility]    Anesthesia Start: 0735 Anesthesia Stop: 1451    Procedure: CORONARY ARTERY BYPASS GRAFT TIMES FOUR, CARDIOPULOMARY BYPASS, ENDOSCOPIC SAPHENOUS VEIN GRAFT HARVEST, POSTERIOR PERICARDIOTOMY, ULTRASOUND INTERROGATION OF GRAFTS, LEFT ATRIAL APPENDAGE CLIP PLACEMENT, TRANSESOPHAGEAL ECHOCARDIOGRAM, BILATERAL PECTORALIS BLOCKS (Chest) Diagnosis:       CAD (coronary artery disease)      (CAD (coronary artery disease) [I25.10])    Surgeons: Esthela Gilbert MD Responsible Provider: Jf Gomez MD    Anesthesia Type: general ASA Status: 4            Anesthesia Type: No value filed.    Jason Phase I:      Jason Phase II:      Anesthesia Post Evaluation    Patient location during evaluation: ICU  Patient participation: complete - patient cannot participate  Level of consciousness: sedated and ventilated  Pain score: 0  Airway patency: patent  Nausea & Vomiting: no nausea and no vomiting  Cardiovascular status: vasoactive/inotropes  Respiratory status: acceptable and ventilator  Hydration status: stable    No notable events documented.

## 2025-01-14 NOTE — ANESTHESIA PROCEDURE NOTES
Procedure Performed: MP       Start Time:  1/14/2025 8:00 AM       End Time:   1/14/2025 8:14 AM    Anesthesia Information  Performed Personally        Preanesthesia Checklist:  Patient identified, IV assessed, risks and benefits discussed, monitors and equipment assessed, procedure being performed at surgeon's request and anesthesia consent obtained.    General Procedure Information  Diagnostic Indications for Echo:  hemodynamic monitoring  Physician Requesting Echo: Esthela Gilbert MD  Location performed:  OR  Intubated  Bite block placed  Heart visualized  Probe Insertion:  Easy  Probe Type:  3D  Modalities:  2D, M-mode, pulse wave Doppler, continuous wave Doppler and color flow mapping    Echocardiographic and Doppler Measurements    Ventricles    Ventricle  Cavity Size  Cavity          Dimension  Hypertrophy  Thrombus  Global FXN  EF    RV  normal    No  No  normal      LV  normal    No  No  normal (50-70%)  55%           Valves     Valves  Annulus  Stenosis Measurements   Regurg  Leaflet   Morph  Leaflet   Motion Valve Comments    Aortic normal Stenosis none.      Peak Gradient:9 mmHg  Mean Gradient: 5 mmHg    none   calcified restricted       Mitral normal none             mild normal normal    Tricuspid normal   not present         trace   normal   normal      Pulmonic normal   not present         none              Aorta     Aorta  Size  Diam(cm)  Dissection Morillo Classification    Ascending normal         Arch normal        Descending normal    Dissection not present.            Other Aortic Findings:       Atherosclerotic changes starting at the arch and continuing into descending aorta    Atria     Size  SEC (smoke)  Thrombus  Tumor  Device    Rt Atrium normal        Lt Atrium normal         Left Atrial Appendage: normal        Septa    Interatrial Septal Morphology: normal          Interventricular Septal Morphology: normal              Other Findings  Pericardium:  normal  Pleural Effusion:

## 2025-01-14 NOTE — PROCEDURES
PROCEDURE NOTE  Date: 1/14/2025   Name: Dawood Epps  YOB: 1955    Procedures    REASON FOR TEST:   Cardiothoracic surgery     TEST RESULTS:     SPIROMETRY:  Spirometry quality is questionable with lots of hesitation.   FEV1/FVC ratio is: 45%.  FEV1 is 1.2 L, 32% of predicted while FVC is 2.6 L, 52% of predicted.     IMPRESSION:  Severe obstruction.  Full PFTs recommended.    Clinical correlation recommended.    _____________________________________________________________  Electronically signed by:  Pooja Ma MD,FACP    1/14/2025    4:04 PM.     [facility]  [address], [place] [address]   Phone (office): [phone]

## 2025-01-14 NOTE — ANESTHESIA PRE PROCEDURE
106 01/14/2025 04:20 AM    CO2 24 01/14/2025 04:20 AM    BUN 19 01/14/2025 04:20 AM    CREATININE 1.3 01/14/2025 04:20 AM    GFRAA >60 06/24/2018 03:08 AM    AGRATIO 0.8 02/17/2024 06:02 AM    LABGLOM 59 01/14/2025 04:20 AM    LABGLOM >60 02/17/2024 06:02 AM    LABGLOM 61 03/06/2023 12:00 AM    GLUCOSE 101 01/14/2025 04:20 AM    CALCIUM 9.2 01/14/2025 04:20 AM    BILITOT 0.4 01/14/2025 04:20 AM    ALKPHOS 65 01/14/2025 04:20 AM    AST 27 01/14/2025 04:20 AM    ALT 27 01/14/2025 04:20 AM       POC Tests: No results for input(s): \"POCGLU\", \"POCNA\", \"POCK\", \"POCCL\", \"POCBUN\", \"POCHEMO\", \"POCHCT\" in the last 72 hours.    Coags:   Lab Results   Component Value Date/Time    PROTIME 13.3 01/14/2025 04:20 AM    INR 0.99 01/14/2025 04:20 AM    APTT 26.5 01/11/2025 03:18 PM       HCG (If Applicable): No results found for: \"PREGTESTUR\", \"PREGSERUM\", \"HCG\", \"HCGQUANT\"     ABGs: No results found for: \"PHART\", \"PO2ART\", \"WPK0ZKW\", \"RLQ9JCF\", \"BEART\", \"Z7NIQEOJ\"     Type & Screen (If Applicable):  Lab Results   Component Value Date    ABORH A POS 01/13/2025    LABANTI NEG 01/13/2025       Drug/Infectious Status (If Applicable):  No results found for: \"HIV\", \"HEPCAB\"    COVID-19 Screening (If Applicable):   Lab Results   Component Value Date/Time    COVID19 NOT DETECTED 02/16/2024 12:05 PM           Anesthesia Evaluation  Patient summary reviewed and Nursing notes reviewed  Airway: Mallampati: II  TM distance: >3 FB   Neck ROM: full  Mouth opening: > = 3 FB   Dental: normal exam         Pulmonary:normal exam  breath sounds clear to auscultation  (+)  COPD:          asthma:                            Cardiovascular:    (+) hypertension:, past MI:, CAD:        Rhythm: regular  Rate: normal                    Neuro/Psych:   (+) CVA:            GI/Hepatic/Renal:   (+) hepatitis:, liver disease:          Endo/Other: Negative Endo/Other ROS                    Abdominal:             Vascular: negative vascular ROS.         Other Findings:

## 2025-01-14 NOTE — ANESTHESIA PROCEDURE NOTES
Central Venous Line:    A central venous line was placed using surface landmarks, in the OR for the following indication(s): central venous access and CVP monitoring.1/14/2025 7:45 AM1/14/2025 7:49 AM    Sterility preparation included the following: provider used sterile gloves, gown, hat and mask and maximum sterile barriers used during central venous catheter insertion.    The patient was placed in Trendelenburg position.The right internal jugular vein was prepped.    The site was prepped with Chloraprep.  A 9 Fr (size), 10 (length), introducer triple lumen was placed.    During the procedure, the following specific steps were taken: target vein identified, needle advanced into vein and blood aspirated and guidewire advanced into vein.    Intravenous verification was obtained by venous blood return.    Post insertion care included: all ports aspirated, all ports flushed easily, guidewire removed intact, Biopatch applied, line sutured in place and dressing applied.    During the procedure the patient experienced: patient tolerated procedure well with no complications and EBL < 5mL.      Outcomes: uncomplicated and patient tolerated procedure wellNo  Anesthesia type: general..No  Staffing  Performed: Anesthesiologist   Anesthesiologist: Jf Gomez MD  Performed by: Jf Gomez MD  Authorized by: Jf Gomez MD    Preanesthetic Checklist  Completed: patient identified, timeout performed and monitors applied/VS acknowledged

## 2025-01-15 ENCOUNTER — APPOINTMENT (OUTPATIENT)
Dept: GENERAL RADIOLOGY | Age: 70
DRG: 234 | End: 2025-01-15
Payer: MEDICARE

## 2025-01-15 LAB
ANION GAP SERPL CALCULATED.3IONS-SCNC: 9 MMOL/L (ref 3–16)
BUN SERPL-MCNC: 21 MG/DL (ref 7–20)
CA-I BLD-SCNC: 1.14 MMOL/L (ref 1.12–1.32)
CALCIUM SERPL-MCNC: 8.8 MG/DL (ref 8.3–10.6)
CHLORIDE SERPL-SCNC: 113 MMOL/L (ref 99–110)
CO2 SERPL-SCNC: 24 MMOL/L (ref 21–32)
CREAT SERPL-MCNC: 1.7 MG/DL (ref 0.8–1.3)
DEPRECATED RDW RBC AUTO: 15.6 % (ref 12.4–15.4)
GFR SERPLBLD CREATININE-BSD FMLA CKD-EPI: 43 ML/MIN/{1.73_M2}
GLUCOSE BLD-MCNC: 103 MG/DL (ref 70–99)
GLUCOSE BLD-MCNC: 107 MG/DL (ref 70–99)
GLUCOSE BLD-MCNC: 125 MG/DL (ref 70–99)
GLUCOSE BLD-MCNC: 126 MG/DL (ref 70–99)
GLUCOSE BLD-MCNC: 127 MG/DL (ref 70–99)
GLUCOSE BLD-MCNC: 130 MG/DL (ref 70–99)
GLUCOSE BLD-MCNC: 134 MG/DL (ref 70–99)
GLUCOSE BLD-MCNC: 137 MG/DL (ref 70–99)
GLUCOSE BLD-MCNC: 140 MG/DL (ref 70–99)
GLUCOSE BLD-MCNC: 143 MG/DL (ref 70–99)
GLUCOSE BLD-MCNC: 148 MG/DL (ref 70–99)
GLUCOSE BLD-MCNC: 155 MG/DL (ref 70–99)
GLUCOSE SERPL-MCNC: 148 MG/DL (ref 70–99)
HCT VFR BLD AUTO: 25.6 % (ref 40.5–52.5)
HGB BLD-MCNC: 8.4 G/DL (ref 13.5–17.5)
INR PPP: 1.42 (ref 0.85–1.15)
MAGNESIUM SERPL-MCNC: 2.1 MG/DL (ref 1.8–2.4)
MCH RBC QN AUTO: 29.3 PG (ref 26–34)
MCHC RBC AUTO-ENTMCNC: 32.7 G/DL (ref 31–36)
MCV RBC AUTO: 89.6 FL (ref 80–100)
PERFORMED ON: ABNORMAL
PH BLDV: 7.35 [PH] (ref 7.35–7.45)
PLATELET # BLD AUTO: 105 K/UL (ref 135–450)
PMV BLD AUTO: 9.3 FL (ref 5–10.5)
POTASSIUM SERPL-SCNC: 4.6 MMOL/L (ref 3.5–5.1)
PROTHROMBIN TIME: 17.5 SEC (ref 11.9–14.9)
RBC # BLD AUTO: 2.86 M/UL (ref 4.2–5.9)
SODIUM SERPL-SCNC: 146 MMOL/L (ref 136–145)
WBC # BLD AUTO: 11.9 K/UL (ref 4–11)

## 2025-01-15 PROCEDURE — 94761 N-INVAS EAR/PLS OXIMETRY MLT: CPT

## 2025-01-15 PROCEDURE — 97166 OT EVAL MOD COMPLEX 45 MIN: CPT

## 2025-01-15 PROCEDURE — 6370000000 HC RX 637 (ALT 250 FOR IP)

## 2025-01-15 PROCEDURE — 80048 BASIC METABOLIC PNL TOTAL CA: CPT

## 2025-01-15 PROCEDURE — 2100000000 HC CCU R&B

## 2025-01-15 PROCEDURE — 97162 PT EVAL MOD COMPLEX 30 MIN: CPT

## 2025-01-15 PROCEDURE — 6360000002 HC RX W HCPCS

## 2025-01-15 PROCEDURE — 6370000000 HC RX 637 (ALT 250 FOR IP): Performed by: THORACIC SURGERY (CARDIOTHORACIC VASCULAR SURGERY)

## 2025-01-15 PROCEDURE — 85027 COMPLETE CBC AUTOMATED: CPT

## 2025-01-15 PROCEDURE — 99024 POSTOP FOLLOW-UP VISIT: CPT

## 2025-01-15 PROCEDURE — 97530 THERAPEUTIC ACTIVITIES: CPT

## 2025-01-15 PROCEDURE — P9045 ALBUMIN (HUMAN), 5%, 250 ML: HCPCS

## 2025-01-15 PROCEDURE — 2700000000 HC OXYGEN THERAPY PER DAY

## 2025-01-15 PROCEDURE — 71045 X-RAY EXAM CHEST 1 VIEW: CPT

## 2025-01-15 PROCEDURE — 82330 ASSAY OF CALCIUM: CPT

## 2025-01-15 PROCEDURE — 2500000003 HC RX 250 WO HCPCS

## 2025-01-15 PROCEDURE — 94669 MECHANICAL CHEST WALL OSCILL: CPT

## 2025-01-15 PROCEDURE — 2580000003 HC RX 258

## 2025-01-15 PROCEDURE — 94640 AIRWAY INHALATION TREATMENT: CPT

## 2025-01-15 PROCEDURE — 83735 ASSAY OF MAGNESIUM: CPT

## 2025-01-15 PROCEDURE — 85610 PROTHROMBIN TIME: CPT

## 2025-01-15 RX ORDER — HEPARIN SODIUM 5000 [USP'U]/ML
5000 INJECTION, SOLUTION INTRAVENOUS; SUBCUTANEOUS EVERY 8 HOURS SCHEDULED
Status: DISCONTINUED | OUTPATIENT
Start: 2025-01-16 | End: 2025-01-21 | Stop reason: HOSPADM

## 2025-01-15 RX ORDER — FAMOTIDINE 20 MG/1
20 TABLET, FILM COATED ORAL DAILY
Status: DISCONTINUED | OUTPATIENT
Start: 2025-01-15 | End: 2025-01-21 | Stop reason: HOSPADM

## 2025-01-15 RX ORDER — METHOCARBAMOL 750 MG/1
750 TABLET, FILM COATED ORAL 4 TIMES DAILY
Status: DISCONTINUED | OUTPATIENT
Start: 2025-01-15 | End: 2025-01-21 | Stop reason: HOSPADM

## 2025-01-15 RX ORDER — FLUTICASONE PROPIONATE 50 MCG
2 SPRAY, SUSPENSION (ML) NASAL DAILY PRN
Status: DISCONTINUED | OUTPATIENT
Start: 2025-01-15 | End: 2025-01-21 | Stop reason: HOSPADM

## 2025-01-15 RX ORDER — GABAPENTIN 100 MG/1
100 CAPSULE ORAL 3 TIMES DAILY
Status: DISCONTINUED | OUTPATIENT
Start: 2025-01-15 | End: 2025-01-16

## 2025-01-15 RX ADMIN — WATER 2000 MG: 1 INJECTION INTRAMUSCULAR; INTRAVENOUS; SUBCUTANEOUS at 13:02

## 2025-01-15 RX ADMIN — SODIUM CHLORIDE, PRESERVATIVE FREE 10 ML: 5 INJECTION INTRAVENOUS at 20:46

## 2025-01-15 RX ADMIN — GABAPENTIN 100 MG: 100 CAPSULE ORAL at 13:01

## 2025-01-15 RX ADMIN — ALBUTEROL SULFATE 2.5 MG: 2.5 SOLUTION RESPIRATORY (INHALATION) at 07:43

## 2025-01-15 RX ADMIN — VANCOMYCIN 1500 MG: 1.5 INJECTION, SOLUTION INTRAVENOUS at 08:26

## 2025-01-15 RX ADMIN — ALBUTEROL SULFATE 2.5 MG: 2.5 SOLUTION RESPIRATORY (INHALATION) at 19:29

## 2025-01-15 RX ADMIN — MORPHINE SULFATE 4 MG: 4 INJECTION, SOLUTION INTRAMUSCULAR; INTRAVENOUS at 06:58

## 2025-01-15 RX ADMIN — MORPHINE SULFATE 2 MG: 2 INJECTION, SOLUTION INTRAMUSCULAR; INTRAVENOUS at 03:51

## 2025-01-15 RX ADMIN — ALBUTEROL SULFATE 2.5 MG: 2.5 SOLUTION RESPIRATORY (INHALATION) at 12:03

## 2025-01-15 RX ADMIN — SODIUM CHLORIDE 1.5 UNITS/HR: 9 INJECTION, SOLUTION INTRAVENOUS at 02:11

## 2025-01-15 RX ADMIN — ALBUMIN (HUMAN) 12.5 G: 2.5 SOLUTION INTRAVENOUS at 06:35

## 2025-01-15 RX ADMIN — METHOCARBAMOL TABLETS 750 MG: 750 TABLET, COATED ORAL at 13:01

## 2025-01-15 RX ADMIN — ATORVASTATIN CALCIUM 40 MG: 40 TABLET, FILM COATED ORAL at 20:46

## 2025-01-15 RX ADMIN — ALBUTEROL SULFATE 2.5 MG: 2.5 SOLUTION RESPIRATORY (INHALATION) at 15:28

## 2025-01-15 RX ADMIN — METHOCARBAMOL TABLETS 750 MG: 750 TABLET, COATED ORAL at 20:46

## 2025-01-15 RX ADMIN — FUROSEMIDE 20 MG: 10 INJECTION, SOLUTION INTRAMUSCULAR; INTRAVENOUS at 15:39

## 2025-01-15 RX ADMIN — FONDAPARINUX SODIUM 2.5 MG: 2.5 INJECTION, SOLUTION SUBCUTANEOUS at 09:36

## 2025-01-15 RX ADMIN — OXYCODONE 10 MG: 5 TABLET ORAL at 19:51

## 2025-01-15 RX ADMIN — OXYCODONE 10 MG: 5 TABLET ORAL at 15:39

## 2025-01-15 RX ADMIN — ACETAMINOPHEN 1000 MG: 500 TABLET ORAL at 18:11

## 2025-01-15 RX ADMIN — FLUTICASONE PROPIONATE 2 SPRAY: 50 SPRAY, METERED NASAL at 16:00

## 2025-01-15 RX ADMIN — MUPIROCIN: 20 OINTMENT TOPICAL at 21:32

## 2025-01-15 RX ADMIN — WATER 2000 MG: 1 INJECTION INTRAMUSCULAR; INTRAVENOUS; SUBCUTANEOUS at 03:51

## 2025-01-15 RX ADMIN — ACETAMINOPHEN 1000 MG: 500 TABLET ORAL at 06:53

## 2025-01-15 RX ADMIN — FAMOTIDINE 20 MG: 20 TABLET, FILM COATED ORAL at 09:37

## 2025-01-15 RX ADMIN — METHOCARBAMOL TABLETS 750 MG: 750 TABLET, COATED ORAL at 18:11

## 2025-01-15 RX ADMIN — ALBUMIN (HUMAN) 12.5 G: 2.5 SOLUTION INTRAVENOUS at 02:27

## 2025-01-15 RX ADMIN — MORPHINE SULFATE 4 MG: 4 INJECTION, SOLUTION INTRAMUSCULAR; INTRAVENOUS at 08:22

## 2025-01-15 RX ADMIN — NOREPINEPHRINE BITARTRATE 5 MCG/MIN: 64 SOLUTION INTRAVENOUS at 15:45

## 2025-01-15 RX ADMIN — BISACODYL 5 MG: 5 TABLET, COATED ORAL at 09:37

## 2025-01-15 RX ADMIN — OXYCODONE 10 MG: 5 TABLET ORAL at 00:07

## 2025-01-15 RX ADMIN — FUROSEMIDE 20 MG: 10 INJECTION, SOLUTION INTRAMUSCULAR; INTRAVENOUS at 20:47

## 2025-01-15 RX ADMIN — ASPIRIN 81 MG 81 MG: 81 TABLET ORAL at 09:36

## 2025-01-15 RX ADMIN — CHLORHEXIDINE GLUCONATE 15 ML: 1.2 RINSE ORAL at 20:50

## 2025-01-15 RX ADMIN — GABAPENTIN 100 MG: 100 CAPSULE ORAL at 20:46

## 2025-01-15 RX ADMIN — ACETAMINOPHEN 1000 MG: 500 TABLET ORAL at 13:00

## 2025-01-15 RX ADMIN — POTASSIUM CHLORIDE 10 MEQ: 750 TABLET, EXTENDED RELEASE ORAL at 18:11

## 2025-01-15 RX ADMIN — ACETAMINOPHEN 1000 MG: 500 TABLET ORAL at 00:07

## 2025-01-15 RX ADMIN — FUROSEMIDE 20 MG: 10 INJECTION, SOLUTION INTRAMUSCULAR; INTRAVENOUS at 09:36

## 2025-01-15 RX ADMIN — CLOPIDOGREL BISULFATE 75 MG: 75 TABLET, FILM COATED ORAL at 09:37

## 2025-01-15 RX ADMIN — OXYCODONE 5 MG: 5 TABLET ORAL at 04:57

## 2025-01-15 RX ADMIN — WATER 2000 MG: 1 INJECTION INTRAMUSCULAR; INTRAVENOUS; SUBCUTANEOUS at 19:51

## 2025-01-15 RX ADMIN — Medication 400 MG: at 09:37

## 2025-01-15 RX ADMIN — OXYCODONE 10 MG: 5 TABLET ORAL at 09:36

## 2025-01-15 RX ADMIN — POTASSIUM CHLORIDE 10 MEQ: 750 TABLET, EXTENDED RELEASE ORAL at 13:01

## 2025-01-15 RX ADMIN — Medication 400 MG: at 20:46

## 2025-01-15 RX ADMIN — POTASSIUM CHLORIDE 10 MEQ: 750 TABLET, EXTENDED RELEASE ORAL at 09:37

## 2025-01-15 ASSESSMENT — PAIN SCALES - GENERAL
PAINLEVEL_OUTOF10: 5
PAINLEVEL_OUTOF10: 9
PAINLEVEL_OUTOF10: 10
PAINLEVEL_OUTOF10: 5
PAINLEVEL_OUTOF10: 4
PAINLEVEL_OUTOF10: 7
PAINLEVEL_OUTOF10: 8
PAINLEVEL_OUTOF10: 0
PAINLEVEL_OUTOF10: 7
PAINLEVEL_OUTOF10: 6
PAINLEVEL_OUTOF10: 8
PAINLEVEL_OUTOF10: 4
PAINLEVEL_OUTOF10: 6
PAINLEVEL_OUTOF10: 5
PAINLEVEL_OUTOF10: 5
PAINLEVEL_OUTOF10: 6
PAINLEVEL_OUTOF10: 9

## 2025-01-15 ASSESSMENT — PAIN DESCRIPTION - DESCRIPTORS
DESCRIPTORS: ACHING
DESCRIPTORS: ACHING

## 2025-01-15 ASSESSMENT — PAIN DESCRIPTION - LOCATION
LOCATION: STERNUM
LOCATION: STERNUM;NECK

## 2025-01-15 NOTE — CARE COORDINATION
Writer met with pt and family bedside, explained PT recommendation for IPR, along with CTSurg requiring pt have 24hr supervision upon discharge.  Pt/family verbalized understanding, would like to wait and see how pt progresses before making referrals, writer provided IPR list and explained with Medicare no precert required for any level of care.  CM will continue to follow pt's progress and coordinate discharge arrangements.  MAX Villeda-EMILIANA

## 2025-01-16 ENCOUNTER — APPOINTMENT (OUTPATIENT)
Dept: GENERAL RADIOLOGY | Age: 70
DRG: 234 | End: 2025-01-16
Payer: MEDICARE

## 2025-01-16 LAB
ACTIVATED CLOTTING TIME: 123 SEC (ref 99–130)
ACTIVATED CLOTTING TIME: 412 SEC (ref 99–130)
ACTIVATED CLOTTING TIME: 427 SEC (ref 99–130)
ACTIVATED CLOTTING TIME: 449 SEC (ref 99–130)
ACTIVATED CLOTTING TIME: 463 SEC (ref 99–130)
ACTIVATED CLOTTING TIME: 96 SEC (ref 99–130)
ANION GAP SERPL CALCULATED.3IONS-SCNC: 10 MMOL/L (ref 3–16)
BUN SERPL-MCNC: 20 MG/DL (ref 7–20)
CALCIUM SERPL-MCNC: 8.7 MG/DL (ref 8.3–10.6)
CHLORIDE SERPL-SCNC: 105 MMOL/L (ref 99–110)
CO2 SERPL-SCNC: 25 MMOL/L (ref 21–32)
CREAT SERPL-MCNC: 1.7 MG/DL (ref 0.8–1.3)
DEPRECATED RDW RBC AUTO: 15.5 % (ref 12.4–15.4)
GFR SERPLBLD CREATININE-BSD FMLA CKD-EPI: 43 ML/MIN/{1.73_M2}
GLUCOSE BLD-MCNC: 100 MG/DL (ref 70–99)
GLUCOSE BLD-MCNC: 104 MG/DL (ref 70–99)
GLUCOSE BLD-MCNC: 119 MG/DL (ref 70–99)
GLUCOSE BLD-MCNC: 127 MG/DL (ref 70–99)
GLUCOSE BLD-MCNC: 127 MG/DL (ref 70–99)
GLUCOSE BLD-MCNC: 146 MG/DL (ref 70–99)
GLUCOSE BLD-MCNC: 93 MG/DL (ref 70–99)
GLUCOSE SERPL-MCNC: 122 MG/DL (ref 70–99)
HCT VFR BLD AUTO: 27.5 % (ref 40.5–52.5)
HGB BLD-MCNC: 9.4 G/DL (ref 13.5–17.5)
INR PPP: 1.13 (ref 0.85–1.15)
MAGNESIUM SERPL-MCNC: 1.81 MG/DL (ref 1.8–2.4)
MCH RBC QN AUTO: 30.4 PG (ref 26–34)
MCHC RBC AUTO-ENTMCNC: 34.2 G/DL (ref 31–36)
MCV RBC AUTO: 88.8 FL (ref 80–100)
PERFORMED ON: ABNORMAL
PERFORMED ON: NORMAL
PLATELET # BLD AUTO: 104 K/UL (ref 135–450)
PMV BLD AUTO: 9.3 FL (ref 5–10.5)
POTASSIUM SERPL-SCNC: 4 MMOL/L (ref 3.5–5.1)
PROTHROMBIN TIME: 14.7 SEC (ref 11.9–14.9)
RBC # BLD AUTO: 3.09 M/UL (ref 4.2–5.9)
SODIUM SERPL-SCNC: 140 MMOL/L (ref 136–145)
WBC # BLD AUTO: 11 K/UL (ref 4–11)

## 2025-01-16 PROCEDURE — P9045 ALBUMIN (HUMAN), 5%, 250 ML: HCPCS

## 2025-01-16 PROCEDURE — 94640 AIRWAY INHALATION TREATMENT: CPT

## 2025-01-16 PROCEDURE — 71045 X-RAY EXAM CHEST 1 VIEW: CPT

## 2025-01-16 PROCEDURE — 6370000000 HC RX 637 (ALT 250 FOR IP)

## 2025-01-16 PROCEDURE — 33519 CABG ARTERY-VEIN THREE: CPT | Performed by: THORACIC SURGERY (CARDIOTHORACIC VASCULAR SURGERY)

## 2025-01-16 PROCEDURE — 33508 ENDOSCOPIC VEIN HARVEST: CPT | Performed by: THORACIC SURGERY (CARDIOTHORACIC VASCULAR SURGERY)

## 2025-01-16 PROCEDURE — 85610 PROTHROMBIN TIME: CPT

## 2025-01-16 PROCEDURE — 33533 CABG ARTERIAL SINGLE: CPT | Performed by: THORACIC SURGERY (CARDIOTHORACIC VASCULAR SURGERY)

## 2025-01-16 PROCEDURE — 6360000002 HC RX W HCPCS

## 2025-01-16 PROCEDURE — 2700000000 HC OXYGEN THERAPY PER DAY

## 2025-01-16 PROCEDURE — 85027 COMPLETE CBC AUTOMATED: CPT

## 2025-01-16 PROCEDURE — 2100000000 HC CCU R&B

## 2025-01-16 PROCEDURE — 97530 THERAPEUTIC ACTIVITIES: CPT

## 2025-01-16 PROCEDURE — 97116 GAIT TRAINING THERAPY: CPT

## 2025-01-16 PROCEDURE — 2500000003 HC RX 250 WO HCPCS

## 2025-01-16 PROCEDURE — 94669 MECHANICAL CHEST WALL OSCILL: CPT

## 2025-01-16 PROCEDURE — 99024 POSTOP FOLLOW-UP VISIT: CPT

## 2025-01-16 PROCEDURE — 33268 EXCL LAA OPN OTH PX ANY METH: CPT | Performed by: THORACIC SURGERY (CARDIOTHORACIC VASCULAR SURGERY)

## 2025-01-16 PROCEDURE — 94761 N-INVAS EAR/PLS OXIMETRY MLT: CPT

## 2025-01-16 PROCEDURE — 80048 BASIC METABOLIC PNL TOTAL CA: CPT

## 2025-01-16 PROCEDURE — 83735 ASSAY OF MAGNESIUM: CPT

## 2025-01-16 PROCEDURE — 6370000000 HC RX 637 (ALT 250 FOR IP): Performed by: THORACIC SURGERY (CARDIOTHORACIC VASCULAR SURGERY)

## 2025-01-16 RX ORDER — ALBUMIN HUMAN 50 G/1000ML
25 SOLUTION INTRAVENOUS ONCE
Status: COMPLETED | OUTPATIENT
Start: 2025-01-16 | End: 2025-01-16

## 2025-01-16 RX ORDER — TAMSULOSIN HYDROCHLORIDE 0.4 MG/1
0.4 CAPSULE ORAL DAILY
Status: DISCONTINUED | OUTPATIENT
Start: 2025-01-16 | End: 2025-01-19

## 2025-01-16 RX ADMIN — METHOCARBAMOL TABLETS 750 MG: 750 TABLET, COATED ORAL at 13:08

## 2025-01-16 RX ADMIN — ALBUTEROL SULFATE 2.5 MG: 2.5 SOLUTION RESPIRATORY (INHALATION) at 07:49

## 2025-01-16 RX ADMIN — POTASSIUM CHLORIDE 20 MEQ: 29.8 INJECTION, SOLUTION INTRAVENOUS at 05:29

## 2025-01-16 RX ADMIN — HEPARIN SODIUM 5000 UNITS: 5000 INJECTION INTRAVENOUS; SUBCUTANEOUS at 06:46

## 2025-01-16 RX ADMIN — POTASSIUM CHLORIDE 10 MEQ: 750 TABLET, EXTENDED RELEASE ORAL at 17:34

## 2025-01-16 RX ADMIN — ALBUMIN (HUMAN) 25 G: 12.5 INJECTION, SOLUTION INTRAVENOUS at 13:17

## 2025-01-16 RX ADMIN — METHOCARBAMOL TABLETS 750 MG: 750 TABLET, COATED ORAL at 17:34

## 2025-01-16 RX ADMIN — FUROSEMIDE 20 MG: 10 INJECTION, SOLUTION INTRAMUSCULAR; INTRAVENOUS at 21:06

## 2025-01-16 RX ADMIN — MORPHINE SULFATE 4 MG: 4 INJECTION, SOLUTION INTRAMUSCULAR; INTRAVENOUS at 15:04

## 2025-01-16 RX ADMIN — CLOPIDOGREL BISULFATE 75 MG: 75 TABLET, FILM COATED ORAL at 10:22

## 2025-01-16 RX ADMIN — METHOCARBAMOL TABLETS 750 MG: 750 TABLET, COATED ORAL at 21:06

## 2025-01-16 RX ADMIN — METOPROLOL TARTRATE 12.5 MG: 25 TABLET, FILM COATED ORAL at 21:06

## 2025-01-16 RX ADMIN — ALBUTEROL SULFATE 2.5 MG: 2.5 SOLUTION RESPIRATORY (INHALATION) at 19:16

## 2025-01-16 RX ADMIN — Medication 400 MG: at 21:06

## 2025-01-16 RX ADMIN — BISACODYL 5 MG: 5 TABLET, COATED ORAL at 10:20

## 2025-01-16 RX ADMIN — HEPARIN SODIUM 5000 UNITS: 5000 INJECTION INTRAVENOUS; SUBCUTANEOUS at 15:42

## 2025-01-16 RX ADMIN — SODIUM CHLORIDE, PRESERVATIVE FREE 10 ML: 5 INJECTION INTRAVENOUS at 21:06

## 2025-01-16 RX ADMIN — INSULIN GLARGINE 12 UNITS: 100 INJECTION, SOLUTION SUBCUTANEOUS at 21:06

## 2025-01-16 RX ADMIN — FAMOTIDINE 20 MG: 20 TABLET, FILM COATED ORAL at 10:19

## 2025-01-16 RX ADMIN — POLYETHYLENE GLYCOL 3350 17 G: 17 POWDER, FOR SOLUTION ORAL at 10:22

## 2025-01-16 RX ADMIN — OXYCODONE 10 MG: 5 TABLET ORAL at 02:45

## 2025-01-16 RX ADMIN — ALBUTEROL SULFATE 2.5 MG: 2.5 SOLUTION RESPIRATORY (INHALATION) at 11:26

## 2025-01-16 RX ADMIN — ACETAMINOPHEN 1000 MG: 500 TABLET ORAL at 00:20

## 2025-01-16 RX ADMIN — MAGNESIUM SULFATE IN WATER 2000 MG: 40 INJECTION, SOLUTION INTRAVENOUS at 05:32

## 2025-01-16 RX ADMIN — MUPIROCIN: 20 OINTMENT TOPICAL at 21:14

## 2025-01-16 RX ADMIN — OXYCODONE 10 MG: 5 TABLET ORAL at 10:20

## 2025-01-16 RX ADMIN — ASPIRIN 81 MG 81 MG: 81 TABLET ORAL at 10:22

## 2025-01-16 RX ADMIN — SODIUM CHLORIDE, PRESERVATIVE FREE 10 ML: 5 INJECTION INTRAVENOUS at 10:23

## 2025-01-16 RX ADMIN — FUROSEMIDE 20 MG: 10 INJECTION, SOLUTION INTRAMUSCULAR; INTRAVENOUS at 15:05

## 2025-01-16 RX ADMIN — METHOCARBAMOL TABLETS 750 MG: 750 TABLET, COATED ORAL at 10:20

## 2025-01-16 RX ADMIN — ACETAMINOPHEN 1000 MG: 500 TABLET ORAL at 06:46

## 2025-01-16 RX ADMIN — Medication 400 MG: at 10:20

## 2025-01-16 RX ADMIN — MUPIROCIN: 20 OINTMENT TOPICAL at 10:22

## 2025-01-16 RX ADMIN — ATORVASTATIN CALCIUM 40 MG: 40 TABLET, FILM COATED ORAL at 21:05

## 2025-01-16 RX ADMIN — HEPARIN SODIUM 5000 UNITS: 5000 INJECTION INTRAVENOUS; SUBCUTANEOUS at 21:06

## 2025-01-16 RX ADMIN — ALBUTEROL SULFATE 2.5 MG: 2.5 SOLUTION RESPIRATORY (INHALATION) at 16:24

## 2025-01-16 RX ADMIN — ACETAMINOPHEN 1000 MG: 500 TABLET ORAL at 13:08

## 2025-01-16 RX ADMIN — ACETAMINOPHEN 1000 MG: 500 TABLET ORAL at 17:34

## 2025-01-16 RX ADMIN — POTASSIUM CHLORIDE 10 MEQ: 750 TABLET, EXTENDED RELEASE ORAL at 10:19

## 2025-01-16 RX ADMIN — WATER 2000 MG: 1 INJECTION INTRAMUSCULAR; INTRAVENOUS; SUBCUTANEOUS at 04:43

## 2025-01-16 RX ADMIN — CHLORHEXIDINE GLUCONATE 15 ML: 1.2 RINSE ORAL at 10:22

## 2025-01-16 RX ADMIN — FUROSEMIDE 20 MG: 10 INJECTION, SOLUTION INTRAMUSCULAR; INTRAVENOUS at 10:22

## 2025-01-16 RX ADMIN — TAMSULOSIN HYDROCHLORIDE 0.4 MG: 0.4 CAPSULE ORAL at 13:08

## 2025-01-16 RX ADMIN — METOPROLOL TARTRATE 12.5 MG: 25 TABLET, FILM COATED ORAL at 10:22

## 2025-01-16 RX ADMIN — ACETAMINOPHEN 1000 MG: 500 TABLET ORAL at 23:45

## 2025-01-16 RX ADMIN — POTASSIUM CHLORIDE 10 MEQ: 750 TABLET, EXTENDED RELEASE ORAL at 13:08

## 2025-01-16 ASSESSMENT — PAIN SCALES - GENERAL
PAINLEVEL_OUTOF10: 9
PAINLEVEL_OUTOF10: 4
PAINLEVEL_OUTOF10: 7
PAINLEVEL_OUTOF10: 6
PAINLEVEL_OUTOF10: 7
PAINLEVEL_OUTOF10: 7
PAINLEVEL_OUTOF10: 4
PAINLEVEL_OUTOF10: 3
PAINLEVEL_OUTOF10: 5

## 2025-01-16 ASSESSMENT — PAIN - FUNCTIONAL ASSESSMENT: PAIN_FUNCTIONAL_ASSESSMENT: ACTIVITIES ARE NOT PREVENTED

## 2025-01-16 ASSESSMENT — PAIN DESCRIPTION - PAIN TYPE: TYPE: SURGICAL PAIN

## 2025-01-16 ASSESSMENT — PAIN DESCRIPTION - LOCATION
LOCATION: CHEST
LOCATION: STERNUM
LOCATION: CHEST

## 2025-01-16 ASSESSMENT — PAIN DESCRIPTION - ONSET: ONSET: ON-GOING

## 2025-01-16 ASSESSMENT — PAIN DESCRIPTION - DESCRIPTORS: DESCRIPTORS: ACHING;STABBING;SPASM;SHARP

## 2025-01-16 ASSESSMENT — PAIN DESCRIPTION - ORIENTATION: ORIENTATION: MID

## 2025-01-16 NOTE — OP NOTE
and manual inspection of the ascending aorta confirmed plaque atherosclerosis at the takeoff of the innominate in addition to scattered plaque in the distal ascending aorta.  Cannulation was tailored to an area of the ascending aorta that had no disease.  The remainder of the cannulation was per usual protocol with a venous return cannula in both antegrade and retrograde cardioplegia.  Patient underwent retrograde autologous priming of the cardiopulmonary bypass circuit and was placed on cardiopulmonary bypass.  The aorta was circumferentially dissected to allow placement of the aortic cross-clamp in the mid to proximal ascending aorta to avoid the known plaque more distally.  Cross-clamp was placed with no flow and blood pressure mapped in the 20s to minimize trauma.  Once the cross-clamp was placed electromechanical arrest was obtained with combination of antegrade and retrograde cardioplegia.  Core temperature was allowed to drift.    Following grafts were performed:  1 SVG to PDA  2 SVG to OM 2  3 SVG to OM1  4 LIMA to LAD    Left atrial appendage exclusion  All saphenous vein grafts were performed with a reverse segment of saphenous vein in an end-to-side fashion using running 4-0 Prolene.  Measure of flow with antegrade perfusion of the vein graft was performed for all and was good.  PDA proximal was performed under single cross-clamp.  The obtuse marginal graft was also performed under single cross-clamp with removal of the antegrade cardioplegia site both were marked with a radiopaque marker.  The second obtuse marginal was anastomosed to the lu of the proximal for the other marginal.    After an adequate period of myocardial reperfusion patient was weaned from cardiopulmonary bypass without difficulty.  Decannulation proceeded in the usual fashion uneventfully.  Examination of all proximal and distal anastomosis showed them to be hemostatic with vein grafts laying in good position.  Atrial and ventricular

## 2025-01-17 ENCOUNTER — APPOINTMENT (OUTPATIENT)
Dept: GENERAL RADIOLOGY | Age: 70
DRG: 234 | End: 2025-01-17
Payer: MEDICARE

## 2025-01-17 LAB
ANION GAP SERPL CALCULATED.3IONS-SCNC: 6 MMOL/L (ref 3–16)
BLOOD BANK DISPENSE STATUS: NORMAL
BLOOD BANK DISPENSE STATUS: NORMAL
BLOOD BANK PRODUCT CODE: NORMAL
BLOOD BANK PRODUCT CODE: NORMAL
BPU ID: NORMAL
BPU ID: NORMAL
BUN SERPL-MCNC: 23 MG/DL (ref 7–20)
CALCIUM SERPL-MCNC: 8 MG/DL (ref 8.3–10.6)
CHLORIDE SERPL-SCNC: 104 MMOL/L (ref 99–110)
CO2 SERPL-SCNC: 28 MMOL/L (ref 21–32)
CREAT SERPL-MCNC: 1.5 MG/DL (ref 0.8–1.3)
DEPRECATED RDW RBC AUTO: 15.4 % (ref 12.4–15.4)
DESCRIPTION BLOOD BANK: NORMAL
DESCRIPTION BLOOD BANK: NORMAL
GFR SERPLBLD CREATININE-BSD FMLA CKD-EPI: 49 ML/MIN/{1.73_M2}
GLUCOSE BLD-MCNC: 111 MG/DL (ref 70–99)
GLUCOSE BLD-MCNC: 119 MG/DL (ref 70–99)
GLUCOSE BLD-MCNC: 159 MG/DL (ref 70–99)
GLUCOSE BLD-MCNC: 184 MG/DL (ref 70–99)
GLUCOSE SERPL-MCNC: 183 MG/DL (ref 70–99)
HCT VFR BLD AUTO: 22.9 % (ref 40.5–52.5)
HGB BLD-MCNC: 7.8 G/DL (ref 13.5–17.5)
INR PPP: 1.14 (ref 0.85–1.15)
MAGNESIUM SERPL-MCNC: 1.93 MG/DL (ref 1.8–2.4)
MCH RBC QN AUTO: 30.4 PG (ref 26–34)
MCHC RBC AUTO-ENTMCNC: 34 G/DL (ref 31–36)
MCV RBC AUTO: 89.2 FL (ref 80–100)
PERFORMED ON: ABNORMAL
PLATELET # BLD AUTO: 96 K/UL (ref 135–450)
PLATELET BLD QL SMEAR: ABNORMAL
PMV BLD AUTO: 9.7 FL (ref 5–10.5)
POTASSIUM SERPL-SCNC: 4 MMOL/L (ref 3.5–5.1)
PROTHROMBIN TIME: 14.9 SEC (ref 11.9–14.9)
RBC # BLD AUTO: 2.57 M/UL (ref 4.2–5.9)
SLIDE REVIEW: ABNORMAL
SODIUM SERPL-SCNC: 138 MMOL/L (ref 136–145)
WBC # BLD AUTO: 6.2 K/UL (ref 4–11)

## 2025-01-17 PROCEDURE — 6370000000 HC RX 637 (ALT 250 FOR IP)

## 2025-01-17 PROCEDURE — 97530 THERAPEUTIC ACTIVITIES: CPT

## 2025-01-17 PROCEDURE — 97535 SELF CARE MNGMENT TRAINING: CPT

## 2025-01-17 PROCEDURE — 94640 AIRWAY INHALATION TREATMENT: CPT

## 2025-01-17 PROCEDURE — 99024 POSTOP FOLLOW-UP VISIT: CPT

## 2025-01-17 PROCEDURE — 71045 X-RAY EXAM CHEST 1 VIEW: CPT

## 2025-01-17 PROCEDURE — 94669 MECHANICAL CHEST WALL OSCILL: CPT

## 2025-01-17 PROCEDURE — 2700000000 HC OXYGEN THERAPY PER DAY

## 2025-01-17 PROCEDURE — 6370000000 HC RX 637 (ALT 250 FOR IP): Performed by: THORACIC SURGERY (CARDIOTHORACIC VASCULAR SURGERY)

## 2025-01-17 PROCEDURE — 80048 BASIC METABOLIC PNL TOTAL CA: CPT

## 2025-01-17 PROCEDURE — 85610 PROTHROMBIN TIME: CPT

## 2025-01-17 PROCEDURE — 6360000002 HC RX W HCPCS

## 2025-01-17 PROCEDURE — 94761 N-INVAS EAR/PLS OXIMETRY MLT: CPT

## 2025-01-17 PROCEDURE — 2100000000 HC CCU R&B

## 2025-01-17 PROCEDURE — 83735 ASSAY OF MAGNESIUM: CPT

## 2025-01-17 PROCEDURE — 97116 GAIT TRAINING THERAPY: CPT

## 2025-01-17 PROCEDURE — 85027 COMPLETE CBC AUTOMATED: CPT

## 2025-01-17 PROCEDURE — 2500000003 HC RX 250 WO HCPCS

## 2025-01-17 RX ADMIN — HEPARIN SODIUM 5000 UNITS: 5000 INJECTION INTRAVENOUS; SUBCUTANEOUS at 15:55

## 2025-01-17 RX ADMIN — POTASSIUM CHLORIDE 10 MEQ: 750 TABLET, EXTENDED RELEASE ORAL at 08:58

## 2025-01-17 RX ADMIN — CHLORHEXIDINE GLUCONATE 15 ML: 1.2 RINSE ORAL at 19:34

## 2025-01-17 RX ADMIN — ACETAMINOPHEN 1000 MG: 500 TABLET ORAL at 18:51

## 2025-01-17 RX ADMIN — BISACODYL 5 MG: 5 TABLET, COATED ORAL at 08:58

## 2025-01-17 RX ADMIN — CHLORHEXIDINE GLUCONATE 15 ML: 1.2 RINSE ORAL at 08:59

## 2025-01-17 RX ADMIN — INSULIN LISPRO 2 UNITS: 100 INJECTION, SOLUTION INTRAVENOUS; SUBCUTANEOUS at 05:29

## 2025-01-17 RX ADMIN — FAMOTIDINE 20 MG: 20 TABLET, FILM COATED ORAL at 08:59

## 2025-01-17 RX ADMIN — HEPARIN SODIUM 5000 UNITS: 5000 INJECTION INTRAVENOUS; SUBCUTANEOUS at 06:24

## 2025-01-17 RX ADMIN — ACETAMINOPHEN 1000 MG: 500 TABLET ORAL at 06:24

## 2025-01-17 RX ADMIN — POTASSIUM CHLORIDE 20 MEQ: 29.8 INJECTION, SOLUTION INTRAVENOUS at 05:28

## 2025-01-17 RX ADMIN — ALBUTEROL SULFATE 2.5 MG: 2.5 SOLUTION RESPIRATORY (INHALATION) at 15:14

## 2025-01-17 RX ADMIN — METHOCARBAMOL TABLETS 750 MG: 750 TABLET, COATED ORAL at 09:00

## 2025-01-17 RX ADMIN — METHOCARBAMOL TABLETS 750 MG: 750 TABLET, COATED ORAL at 18:50

## 2025-01-17 RX ADMIN — METHOCARBAMOL TABLETS 750 MG: 750 TABLET, COATED ORAL at 11:54

## 2025-01-17 RX ADMIN — INSULIN GLARGINE 12 UNITS: 100 INJECTION, SOLUTION SUBCUTANEOUS at 19:35

## 2025-01-17 RX ADMIN — METHOCARBAMOL TABLETS 750 MG: 750 TABLET, COATED ORAL at 19:35

## 2025-01-17 RX ADMIN — TAMSULOSIN HYDROCHLORIDE 0.4 MG: 0.4 CAPSULE ORAL at 09:01

## 2025-01-17 RX ADMIN — POTASSIUM CHLORIDE 10 MEQ: 750 TABLET, EXTENDED RELEASE ORAL at 18:50

## 2025-01-17 RX ADMIN — OXYCODONE 10 MG: 5 TABLET ORAL at 19:22

## 2025-01-17 RX ADMIN — FUROSEMIDE 20 MG: 10 INJECTION, SOLUTION INTRAMUSCULAR; INTRAVENOUS at 03:13

## 2025-01-17 RX ADMIN — ATORVASTATIN CALCIUM 40 MG: 40 TABLET, FILM COATED ORAL at 19:35

## 2025-01-17 RX ADMIN — ALBUTEROL SULFATE 2.5 MG: 2.5 SOLUTION RESPIRATORY (INHALATION) at 20:32

## 2025-01-17 RX ADMIN — ACETAMINOPHEN 1000 MG: 500 TABLET ORAL at 11:53

## 2025-01-17 RX ADMIN — SODIUM CHLORIDE, PRESERVATIVE FREE 10 ML: 5 INJECTION INTRAVENOUS at 09:01

## 2025-01-17 RX ADMIN — Medication 400 MG: at 19:35

## 2025-01-17 RX ADMIN — MUPIROCIN: 20 OINTMENT TOPICAL at 19:34

## 2025-01-17 RX ADMIN — CLOPIDOGREL BISULFATE 75 MG: 75 TABLET, FILM COATED ORAL at 08:59

## 2025-01-17 RX ADMIN — ASPIRIN 81 MG 81 MG: 81 TABLET ORAL at 08:22

## 2025-01-17 RX ADMIN — METOPROLOL TARTRATE 12.5 MG: 25 TABLET, FILM COATED ORAL at 09:00

## 2025-01-17 RX ADMIN — POTASSIUM CHLORIDE 10 MEQ: 750 TABLET, EXTENDED RELEASE ORAL at 11:54

## 2025-01-17 RX ADMIN — MUPIROCIN: 20 OINTMENT TOPICAL at 09:00

## 2025-01-17 RX ADMIN — Medication 400 MG: at 08:59

## 2025-01-17 RX ADMIN — POLYETHYLENE GLYCOL 3350 17 G: 17 POWDER, FOR SOLUTION ORAL at 09:00

## 2025-01-17 ASSESSMENT — PAIN DESCRIPTION - ORIENTATION
ORIENTATION: MID
ORIENTATION: MID

## 2025-01-17 ASSESSMENT — PAIN - FUNCTIONAL ASSESSMENT: PAIN_FUNCTIONAL_ASSESSMENT: ACTIVITIES ARE NOT PREVENTED

## 2025-01-17 ASSESSMENT — PAIN DESCRIPTION - DESCRIPTORS
DESCRIPTORS: SORE

## 2025-01-17 ASSESSMENT — PAIN SCALES - GENERAL
PAINLEVEL_OUTOF10: 7
PAINLEVEL_OUTOF10: 4
PAINLEVEL_OUTOF10: 5
PAINLEVEL_OUTOF10: 8
PAINLEVEL_OUTOF10: 0
PAINLEVEL_OUTOF10: 4
PAINLEVEL_OUTOF10: 3

## 2025-01-17 ASSESSMENT — PAIN DESCRIPTION - LOCATION
LOCATION: GENERALIZED
LOCATION: CHEST;BACK

## 2025-01-17 NOTE — CARE COORDINATION
[facility] - Acute Rehab Unit   After review, this patient is felt to be:       []  Appropriate for Acute Inpatient Rehab    [x]  Appropriate for Acute Inpatient Rehab Pending Insurance Authorization    []  Not appropriate for Acute Inpatient Rehab    []  Referral received and ARU reviewing patient; Evaluation ongoing.      Dr. Yap to see patient today.     Will notify DCP with further updates. Thank you for the referral.     YUAN ALDRIDGE RN

## 2025-01-17 NOTE — CARE COORDINATION
Met with the Pt and family regarding IPR recs. Pt still somewhat hesitant, but is agreeable to referral to learn more. He is hopeful to still progress to be able to go home when he is medically ready. Consult made to ARU. Will follow.

## 2025-01-18 ENCOUNTER — APPOINTMENT (OUTPATIENT)
Dept: GENERAL RADIOLOGY | Age: 70
DRG: 234 | End: 2025-01-18
Payer: MEDICARE

## 2025-01-18 LAB
ANION GAP SERPL CALCULATED.3IONS-SCNC: 7 MMOL/L (ref 3–16)
BUN SERPL-MCNC: 21 MG/DL (ref 7–20)
CALCIUM SERPL-MCNC: 9.1 MG/DL (ref 8.3–10.6)
CHLORIDE SERPL-SCNC: 105 MMOL/L (ref 99–110)
CO2 SERPL-SCNC: 27 MMOL/L (ref 21–32)
CREAT SERPL-MCNC: 1.3 MG/DL (ref 0.8–1.3)
DEPRECATED RDW RBC AUTO: 15.8 % (ref 12.4–15.4)
GFR SERPLBLD CREATININE-BSD FMLA CKD-EPI: 59 ML/MIN/{1.73_M2}
GLUCOSE BLD-MCNC: 116 MG/DL (ref 70–99)
GLUCOSE BLD-MCNC: 124 MG/DL (ref 70–99)
GLUCOSE BLD-MCNC: 125 MG/DL (ref 70–99)
GLUCOSE BLD-MCNC: 127 MG/DL (ref 70–99)
GLUCOSE SERPL-MCNC: 109 MG/DL (ref 70–99)
HCT VFR BLD AUTO: 26.6 % (ref 40.5–52.5)
HGB BLD-MCNC: 8.9 G/DL (ref 13.5–17.5)
INR PPP: 0.94 (ref 0.85–1.15)
MAGNESIUM SERPL-MCNC: 2.26 MG/DL (ref 1.8–2.4)
MCH RBC QN AUTO: 30 PG (ref 26–34)
MCHC RBC AUTO-ENTMCNC: 33.4 G/DL (ref 31–36)
MCV RBC AUTO: 90 FL (ref 80–100)
PERFORMED ON: ABNORMAL
PLATELET # BLD AUTO: 154 K/UL (ref 135–450)
PMV BLD AUTO: 9.5 FL (ref 5–10.5)
POTASSIUM SERPL-SCNC: 4.5 MMOL/L (ref 3.5–5.1)
PROTHROMBIN TIME: 12.7 SEC (ref 11.9–14.9)
RBC # BLD AUTO: 2.95 M/UL (ref 4.2–5.9)
SODIUM SERPL-SCNC: 139 MMOL/L (ref 136–145)
WBC # BLD AUTO: 6.5 K/UL (ref 4–11)

## 2025-01-18 PROCEDURE — 6370000000 HC RX 637 (ALT 250 FOR IP)

## 2025-01-18 PROCEDURE — 80048 BASIC METABOLIC PNL TOTAL CA: CPT

## 2025-01-18 PROCEDURE — 85027 COMPLETE CBC AUTOMATED: CPT

## 2025-01-18 PROCEDURE — 6360000002 HC RX W HCPCS

## 2025-01-18 PROCEDURE — 2100000000 HC CCU R&B

## 2025-01-18 PROCEDURE — 94640 AIRWAY INHALATION TREATMENT: CPT

## 2025-01-18 PROCEDURE — 71045 X-RAY EXAM CHEST 1 VIEW: CPT

## 2025-01-18 PROCEDURE — 36415 COLL VENOUS BLD VENIPUNCTURE: CPT

## 2025-01-18 PROCEDURE — 6370000000 HC RX 637 (ALT 250 FOR IP): Performed by: THORACIC SURGERY (CARDIOTHORACIC VASCULAR SURGERY)

## 2025-01-18 PROCEDURE — 85610 PROTHROMBIN TIME: CPT

## 2025-01-18 PROCEDURE — 83735 ASSAY OF MAGNESIUM: CPT

## 2025-01-18 PROCEDURE — 99024 POSTOP FOLLOW-UP VISIT: CPT

## 2025-01-18 PROCEDURE — 94669 MECHANICAL CHEST WALL OSCILL: CPT

## 2025-01-18 PROCEDURE — 2500000003 HC RX 250 WO HCPCS

## 2025-01-18 RX ORDER — METOPROLOL TARTRATE 25 MG/1
25 TABLET, FILM COATED ORAL 2 TIMES DAILY
Status: DISCONTINUED | OUTPATIENT
Start: 2025-01-18 | End: 2025-01-20

## 2025-01-18 RX ORDER — FUROSEMIDE 10 MG/ML
40 INJECTION INTRAMUSCULAR; INTRAVENOUS 2 TIMES DAILY
Status: DISCONTINUED | OUTPATIENT
Start: 2025-01-18 | End: 2025-01-19

## 2025-01-18 RX ORDER — HYDROXYZINE HYDROCHLORIDE 10 MG/1
10 TABLET, FILM COATED ORAL 3 TIMES DAILY PRN
Status: DISCONTINUED | OUTPATIENT
Start: 2025-01-18 | End: 2025-01-21 | Stop reason: HOSPADM

## 2025-01-18 RX ADMIN — POTASSIUM CHLORIDE 10 MEQ: 750 TABLET, EXTENDED RELEASE ORAL at 18:10

## 2025-01-18 RX ADMIN — INSULIN GLARGINE 12 UNITS: 100 INJECTION, SOLUTION SUBCUTANEOUS at 20:22

## 2025-01-18 RX ADMIN — ACETAMINOPHEN 1000 MG: 500 TABLET ORAL at 12:33

## 2025-01-18 RX ADMIN — ALBUTEROL SULFATE 2.5 MG: 2.5 SOLUTION RESPIRATORY (INHALATION) at 08:36

## 2025-01-18 RX ADMIN — CHLORHEXIDINE GLUCONATE 15 ML: 1.2 RINSE ORAL at 20:23

## 2025-01-18 RX ADMIN — MUPIROCIN: 20 OINTMENT TOPICAL at 11:00

## 2025-01-18 RX ADMIN — SODIUM CHLORIDE, PRESERVATIVE FREE 10 ML: 5 INJECTION INTRAVENOUS at 20:24

## 2025-01-18 RX ADMIN — CHLORHEXIDINE GLUCONATE 15 ML: 1.2 RINSE ORAL at 08:54

## 2025-01-18 RX ADMIN — METHOCARBAMOL TABLETS 750 MG: 750 TABLET, COATED ORAL at 20:23

## 2025-01-18 RX ADMIN — METOPROLOL TARTRATE 25 MG: 25 TABLET, FILM COATED ORAL at 20:23

## 2025-01-18 RX ADMIN — ATORVASTATIN CALCIUM 40 MG: 40 TABLET, FILM COATED ORAL at 20:23

## 2025-01-18 RX ADMIN — FLUTICASONE PROPIONATE 2 SPRAY: 50 SPRAY, METERED NASAL at 08:55

## 2025-01-18 RX ADMIN — Medication 400 MG: at 20:23

## 2025-01-18 RX ADMIN — FUROSEMIDE 40 MG: 10 INJECTION, SOLUTION INTRAMUSCULAR; INTRAVENOUS at 12:33

## 2025-01-18 RX ADMIN — OXYCODONE 10 MG: 5 TABLET ORAL at 02:00

## 2025-01-18 RX ADMIN — ACETAMINOPHEN 1000 MG: 500 TABLET ORAL at 23:57

## 2025-01-18 RX ADMIN — ASPIRIN 81 MG 81 MG: 81 TABLET ORAL at 08:52

## 2025-01-18 RX ADMIN — FAMOTIDINE 20 MG: 20 TABLET, FILM COATED ORAL at 08:52

## 2025-01-18 RX ADMIN — POLYETHYLENE GLYCOL 3350 17 G: 17 POWDER, FOR SOLUTION ORAL at 08:53

## 2025-01-18 RX ADMIN — HEPARIN SODIUM 5000 UNITS: 5000 INJECTION INTRAVENOUS; SUBCUTANEOUS at 12:32

## 2025-01-18 RX ADMIN — METHOCARBAMOL TABLETS 750 MG: 750 TABLET, COATED ORAL at 08:53

## 2025-01-18 RX ADMIN — ALBUTEROL SULFATE 2.5 MG: 2.5 SOLUTION RESPIRATORY (INHALATION) at 11:41

## 2025-01-18 RX ADMIN — Medication 400 MG: at 08:53

## 2025-01-18 RX ADMIN — HEPARIN SODIUM 5000 UNITS: 5000 INJECTION INTRAVENOUS; SUBCUTANEOUS at 23:57

## 2025-01-18 RX ADMIN — POTASSIUM CHLORIDE 10 MEQ: 750 TABLET, EXTENDED RELEASE ORAL at 08:53

## 2025-01-18 RX ADMIN — METHOCARBAMOL TABLETS 750 MG: 750 TABLET, COATED ORAL at 17:25

## 2025-01-18 RX ADMIN — OXYCODONE 10 MG: 5 TABLET ORAL at 08:50

## 2025-01-18 RX ADMIN — ALBUTEROL SULFATE 2.5 MG: 2.5 SOLUTION RESPIRATORY (INHALATION) at 15:26

## 2025-01-18 RX ADMIN — BISACODYL 5 MG: 5 TABLET, COATED ORAL at 08:52

## 2025-01-18 RX ADMIN — MUPIROCIN: 20 OINTMENT TOPICAL at 20:24

## 2025-01-18 RX ADMIN — CLOPIDOGREL BISULFATE 75 MG: 75 TABLET, FILM COATED ORAL at 08:52

## 2025-01-18 RX ADMIN — FUROSEMIDE 40 MG: 10 INJECTION, SOLUTION INTRAMUSCULAR; INTRAVENOUS at 17:25

## 2025-01-18 RX ADMIN — OXYCODONE 10 MG: 5 TABLET ORAL at 20:23

## 2025-01-18 RX ADMIN — TAMSULOSIN HYDROCHLORIDE 0.4 MG: 0.4 CAPSULE ORAL at 08:52

## 2025-01-18 RX ADMIN — ACETAMINOPHEN 1000 MG: 500 TABLET ORAL at 17:25

## 2025-01-18 RX ADMIN — ALBUTEROL SULFATE 2.5 MG: 2.5 SOLUTION RESPIRATORY (INHALATION) at 19:35

## 2025-01-18 RX ADMIN — METHOCARBAMOL TABLETS 750 MG: 750 TABLET, COATED ORAL at 12:34

## 2025-01-18 RX ADMIN — METOPROLOL TARTRATE 25 MG: 25 TABLET, FILM COATED ORAL at 08:52

## 2025-01-18 RX ADMIN — POTASSIUM CHLORIDE 10 MEQ: 750 TABLET, EXTENDED RELEASE ORAL at 12:33

## 2025-01-18 ASSESSMENT — PAIN SCALES - GENERAL
PAINLEVEL_OUTOF10: 0
PAINLEVEL_OUTOF10: 5
PAINLEVEL_OUTOF10: 3
PAINLEVEL_OUTOF10: 7
PAINLEVEL_OUTOF10: 5
PAINLEVEL_OUTOF10: 7
PAINLEVEL_OUTOF10: 7
PAINLEVEL_OUTOF10: 5

## 2025-01-18 ASSESSMENT — PAIN DESCRIPTION - LOCATION
LOCATION: STERNUM
LOCATION: ABDOMEN

## 2025-01-18 ASSESSMENT — PAIN - FUNCTIONAL ASSESSMENT: PAIN_FUNCTIONAL_ASSESSMENT: ACTIVITIES ARE NOT PREVENTED

## 2025-01-18 ASSESSMENT — PAIN DESCRIPTION - ORIENTATION
ORIENTATION: MID
ORIENTATION: LEFT
ORIENTATION: LEFT;LOWER

## 2025-01-18 ASSESSMENT — PAIN DESCRIPTION - DESCRIPTORS
DESCRIPTORS: ACHING
DESCRIPTORS: THROBBING

## 2025-01-19 ENCOUNTER — APPOINTMENT (OUTPATIENT)
Dept: GENERAL RADIOLOGY | Age: 70
DRG: 234 | End: 2025-01-19
Payer: MEDICARE

## 2025-01-19 LAB
ANION GAP SERPL CALCULATED.3IONS-SCNC: 10 MMOL/L (ref 3–16)
BUN SERPL-MCNC: 27 MG/DL (ref 7–20)
CALCIUM SERPL-MCNC: 9.7 MG/DL (ref 8.3–10.6)
CHLORIDE SERPL-SCNC: 100 MMOL/L (ref 99–110)
CO2 SERPL-SCNC: 28 MMOL/L (ref 21–32)
CREAT SERPL-MCNC: 1.5 MG/DL (ref 0.8–1.3)
DEPRECATED RDW RBC AUTO: 15.4 % (ref 12.4–15.4)
GFR SERPLBLD CREATININE-BSD FMLA CKD-EPI: 49 ML/MIN/{1.73_M2}
GLUCOSE BLD-MCNC: 104 MG/DL (ref 70–99)
GLUCOSE BLD-MCNC: 109 MG/DL (ref 70–99)
GLUCOSE BLD-MCNC: 138 MG/DL (ref 70–99)
GLUCOSE BLD-MCNC: 140 MG/DL (ref 70–99)
GLUCOSE SERPL-MCNC: 105 MG/DL (ref 70–99)
HCT VFR BLD AUTO: 26.9 % (ref 40.5–52.5)
HGB BLD-MCNC: 9.1 G/DL (ref 13.5–17.5)
INR PPP: 1.07 (ref 0.85–1.15)
MAGNESIUM SERPL-MCNC: 2.39 MG/DL (ref 1.8–2.4)
MCH RBC QN AUTO: 30.1 PG (ref 26–34)
MCHC RBC AUTO-ENTMCNC: 33.7 G/DL (ref 31–36)
MCV RBC AUTO: 89.3 FL (ref 80–100)
PERFORMED ON: ABNORMAL
PLATELET # BLD AUTO: 199 K/UL (ref 135–450)
PMV BLD AUTO: 9.2 FL (ref 5–10.5)
POTASSIUM SERPL-SCNC: 4.5 MMOL/L (ref 3.5–5.1)
PROTHROMBIN TIME: 14.1 SEC (ref 11.9–14.9)
RBC # BLD AUTO: 3.01 M/UL (ref 4.2–5.9)
SODIUM SERPL-SCNC: 138 MMOL/L (ref 136–145)
WBC # BLD AUTO: 7.1 K/UL (ref 4–11)

## 2025-01-19 PROCEDURE — 80048 BASIC METABOLIC PNL TOTAL CA: CPT

## 2025-01-19 PROCEDURE — 6370000000 HC RX 637 (ALT 250 FOR IP)

## 2025-01-19 PROCEDURE — 2100000000 HC CCU R&B

## 2025-01-19 PROCEDURE — 94669 MECHANICAL CHEST WALL OSCILL: CPT

## 2025-01-19 PROCEDURE — 2500000003 HC RX 250 WO HCPCS

## 2025-01-19 PROCEDURE — 85027 COMPLETE CBC AUTOMATED: CPT

## 2025-01-19 PROCEDURE — 36415 COLL VENOUS BLD VENIPUNCTURE: CPT

## 2025-01-19 PROCEDURE — 97530 THERAPEUTIC ACTIVITIES: CPT

## 2025-01-19 PROCEDURE — 94640 AIRWAY INHALATION TREATMENT: CPT

## 2025-01-19 PROCEDURE — 85610 PROTHROMBIN TIME: CPT

## 2025-01-19 PROCEDURE — 97535 SELF CARE MNGMENT TRAINING: CPT

## 2025-01-19 PROCEDURE — 83735 ASSAY OF MAGNESIUM: CPT

## 2025-01-19 PROCEDURE — 99024 POSTOP FOLLOW-UP VISIT: CPT

## 2025-01-19 PROCEDURE — 6360000002 HC RX W HCPCS

## 2025-01-19 PROCEDURE — 71045 X-RAY EXAM CHEST 1 VIEW: CPT

## 2025-01-19 PROCEDURE — 6370000000 HC RX 637 (ALT 250 FOR IP): Performed by: THORACIC SURGERY (CARDIOTHORACIC VASCULAR SURGERY)

## 2025-01-19 RX ORDER — FUROSEMIDE 40 MG/1
40 TABLET ORAL 2 TIMES DAILY
Status: DISCONTINUED | OUTPATIENT
Start: 2025-01-19 | End: 2025-01-19

## 2025-01-19 RX ORDER — FUROSEMIDE 20 MG/1
20 TABLET ORAL 2 TIMES DAILY
Status: DISCONTINUED | OUTPATIENT
Start: 2025-01-19 | End: 2025-01-20

## 2025-01-19 RX ADMIN — SODIUM CHLORIDE, PRESERVATIVE FREE 10 ML: 5 INJECTION INTRAVENOUS at 20:35

## 2025-01-19 RX ADMIN — POTASSIUM CHLORIDE 10 MEQ: 750 TABLET, EXTENDED RELEASE ORAL at 08:11

## 2025-01-19 RX ADMIN — METHOCARBAMOL TABLETS 750 MG: 750 TABLET, COATED ORAL at 17:30

## 2025-01-19 RX ADMIN — METOPROLOL TARTRATE 25 MG: 25 TABLET, FILM COATED ORAL at 20:33

## 2025-01-19 RX ADMIN — METOPROLOL TARTRATE 25 MG: 25 TABLET, FILM COATED ORAL at 08:11

## 2025-01-19 RX ADMIN — POTASSIUM CHLORIDE 10 MEQ: 750 TABLET, EXTENDED RELEASE ORAL at 12:38

## 2025-01-19 RX ADMIN — MUPIROCIN: 20 OINTMENT TOPICAL at 08:12

## 2025-01-19 RX ADMIN — POTASSIUM CHLORIDE 10 MEQ: 750 TABLET, EXTENDED RELEASE ORAL at 17:30

## 2025-01-19 RX ADMIN — OXYCODONE 10 MG: 5 TABLET ORAL at 09:42

## 2025-01-19 RX ADMIN — ASPIRIN 81 MG 81 MG: 81 TABLET ORAL at 08:11

## 2025-01-19 RX ADMIN — METHOCARBAMOL TABLETS 750 MG: 750 TABLET, COATED ORAL at 20:34

## 2025-01-19 RX ADMIN — HEPARIN SODIUM 5000 UNITS: 5000 INJECTION INTRAVENOUS; SUBCUTANEOUS at 14:54

## 2025-01-19 RX ADMIN — FAMOTIDINE 20 MG: 20 TABLET, FILM COATED ORAL at 08:11

## 2025-01-19 RX ADMIN — HEPARIN SODIUM 5000 UNITS: 5000 INJECTION INTRAVENOUS; SUBCUTANEOUS at 23:56

## 2025-01-19 RX ADMIN — INSULIN GLARGINE 12 UNITS: 100 INJECTION, SOLUTION SUBCUTANEOUS at 20:33

## 2025-01-19 RX ADMIN — FUROSEMIDE 40 MG: 10 INJECTION, SOLUTION INTRAMUSCULAR; INTRAVENOUS at 08:11

## 2025-01-19 RX ADMIN — CHLORHEXIDINE GLUCONATE 15 ML: 1.2 RINSE ORAL at 20:36

## 2025-01-19 RX ADMIN — HEPARIN SODIUM 5000 UNITS: 5000 INJECTION INTRAVENOUS; SUBCUTANEOUS at 06:15

## 2025-01-19 RX ADMIN — FUROSEMIDE 20 MG: 20 TABLET ORAL at 17:30

## 2025-01-19 RX ADMIN — ALBUTEROL SULFATE 2.5 MG: 2.5 SOLUTION RESPIRATORY (INHALATION) at 07:38

## 2025-01-19 RX ADMIN — ALBUTEROL SULFATE 2.5 MG: 2.5 SOLUTION RESPIRATORY (INHALATION) at 20:30

## 2025-01-19 RX ADMIN — ATORVASTATIN CALCIUM 40 MG: 40 TABLET, FILM COATED ORAL at 20:33

## 2025-01-19 RX ADMIN — TAMSULOSIN HYDROCHLORIDE 0.4 MG: 0.4 CAPSULE ORAL at 08:11

## 2025-01-19 RX ADMIN — OXYCODONE 5 MG: 5 TABLET ORAL at 20:34

## 2025-01-19 RX ADMIN — Medication 400 MG: at 20:34

## 2025-01-19 RX ADMIN — ACETAMINOPHEN 1000 MG: 500 TABLET ORAL at 06:15

## 2025-01-19 RX ADMIN — ACETAMINOPHEN 1000 MG: 500 TABLET ORAL at 12:38

## 2025-01-19 RX ADMIN — CHLORHEXIDINE GLUCONATE 15 ML: 1.2 RINSE ORAL at 08:17

## 2025-01-19 RX ADMIN — METHOCARBAMOL TABLETS 750 MG: 750 TABLET, COATED ORAL at 12:38

## 2025-01-19 RX ADMIN — INSULIN LISPRO 2 UNITS: 100 INJECTION, SOLUTION INTRAVENOUS; SUBCUTANEOUS at 20:33

## 2025-01-19 RX ADMIN — ACETAMINOPHEN 1000 MG: 500 TABLET ORAL at 17:30

## 2025-01-19 RX ADMIN — METHOCARBAMOL TABLETS 750 MG: 750 TABLET, COATED ORAL at 08:11

## 2025-01-19 RX ADMIN — SODIUM CHLORIDE, PRESERVATIVE FREE 10 ML: 5 INJECTION INTRAVENOUS at 08:12

## 2025-01-19 RX ADMIN — Medication 400 MG: at 08:11

## 2025-01-19 RX ADMIN — ALBUTEROL SULFATE 2.5 MG: 2.5 SOLUTION RESPIRATORY (INHALATION) at 15:00

## 2025-01-19 RX ADMIN — ALBUTEROL SULFATE 2.5 MG: 2.5 SOLUTION RESPIRATORY (INHALATION) at 11:24

## 2025-01-19 RX ADMIN — CLOPIDOGREL BISULFATE 75 MG: 75 TABLET, FILM COATED ORAL at 08:11

## 2025-01-19 RX ADMIN — OXYCODONE 10 MG: 5 TABLET ORAL at 01:16

## 2025-01-19 ASSESSMENT — PAIN SCALES - GENERAL
PAINLEVEL_OUTOF10: 6
PAINLEVEL_OUTOF10: 7
PAINLEVEL_OUTOF10: 4
PAINLEVEL_OUTOF10: 6

## 2025-01-19 ASSESSMENT — PAIN DESCRIPTION - LOCATION
LOCATION: STERNUM
LOCATION: ABDOMEN
LOCATION: ABDOMEN
LOCATION: STERNUM

## 2025-01-19 ASSESSMENT — PAIN DESCRIPTION - ORIENTATION
ORIENTATION: MID
ORIENTATION: MID
ORIENTATION: LEFT

## 2025-01-19 ASSESSMENT — PAIN DESCRIPTION - DESCRIPTORS
DESCRIPTORS: ACHING;SORE
DESCRIPTORS: THROBBING

## 2025-01-19 NOTE — CARE COORDINATION
Approval for ARU received from [employer] medic[employer]. Left VM for weekend CM.    Kimberly Davis, RN BSN  A ARU Clinical Liasion  [phone]

## 2025-01-19 NOTE — PLAN OF CARE
Problem: Cardiovascular - Adult  Goal: Maintains optimal cardiac output and hemodynamic stability  Outcome: Progressing  Flowsheets (Taken 1/12/2025 2141)  Maintains optimal cardiac output and hemodynamic stability:   Monitor blood pressure and heart rate   Monitor urine output and notify Licensed Independent Practitioner for values outside of normal range   Assess for signs of decreased cardiac output     Problem: Cardiovascular - Adult  Goal: Absence of cardiac dysrhythmias or at baseline  Outcome: Progressing  Flowsheets (Taken 1/12/2025 2141)  Absence of cardiac dysrhythmias or at baseline:   Monitor cardiac rate and rhythm   Assess for signs of decreased cardiac output     
  Problem: Chronic Conditions and Co-morbidities  Goal: Patient's chronic conditions and co-morbidity symptoms are monitored and maintained or improved  1/16/2025 0748 by Remigio Franklin RN  Outcome: Progressing  1/16/2025 0207 by Avtar Curran RN  Outcome: Progressing  Flowsheets (Taken 1/15/2025 2000)  Care Plan - Patient's Chronic Conditions and Co-Morbidity Symptoms are Monitored and Maintained or Improved:   Monitor and assess patient's chronic conditions and comorbid symptoms for stability, deterioration, or improvement   Collaborate with multidisciplinary team to address chronic and comorbid conditions and prevent exacerbation or deterioration   Update acute care plan with appropriate goals if chronic or comorbid symptoms are exacerbated and prevent overall improvement and discharge     Problem: Discharge Planning  Goal: Discharge to home or other facility with appropriate resources  1/16/2025 0748 by Remigio Franklin RN  Outcome: Progressing  1/16/2025 0207 by Avtar Curran RN  Outcome: Progressing  Flowsheets (Taken 1/15/2025 2000)  Discharge to home or other facility with appropriate resources:   Identify barriers to discharge with patient and caregiver   Arrange for needed discharge resources and transportation as appropriate   Identify discharge learning needs (meds, wound care, etc)   Refer to discharge planning if patient needs post-hospital services based on physician order or complex needs related to functional status, cognitive ability or social support system     Problem: Pain  Goal: Verbalizes/displays adequate comfort level or baseline comfort level  1/16/2025 0748 by Remigio Franklin RN  Outcome: Progressing  1/16/2025 0207 by Avtar Curran RN  Outcome: Progressing     Problem: Cardiovascular - Adult  Goal: Maintains optimal cardiac output and hemodynamic stability  1/16/2025 0748 by Remigio Franklin RN  Outcome: Progressing  1/16/2025 0207 by Avtar Curran RN  Outcome: 
  Problem: Chronic Conditions and Co-morbidities  Goal: Patient's chronic conditions and co-morbidity symptoms are monitored and maintained or improved  1/17/2025 0940 by Remigio Franklin RN  Outcome: Progressing  1/17/2025 0323 by Avtar Curran RN  Outcome: Progressing  Flowsheets (Taken 1/16/2025 2000)  Care Plan - Patient's Chronic Conditions and Co-Morbidity Symptoms are Monitored and Maintained or Improved:   Collaborate with multidisciplinary team to address chronic and comorbid conditions and prevent exacerbation or deterioration   Monitor and assess patient's chronic conditions and comorbid symptoms for stability, deterioration, or improvement   Update acute care plan with appropriate goals if chronic or comorbid symptoms are exacerbated and prevent overall improvement and discharge     Problem: Discharge Planning  Goal: Discharge to home or other facility with appropriate resources  1/17/2025 0940 by Remigio Franklin RN  Outcome: Progressing  1/17/2025 0323 by Avtar Curran RN  Outcome: Progressing  Flowsheets (Taken 1/16/2025 2000)  Discharge to home or other facility with appropriate resources:   Identify barriers to discharge with patient and caregiver   Arrange for needed discharge resources and transportation as appropriate   Identify discharge learning needs (meds, wound care, etc)   Refer to discharge planning if patient needs post-hospital services based on physician order or complex needs related to functional status, cognitive ability or social support system     Problem: Pain  Goal: Verbalizes/displays adequate comfort level or baseline comfort level  1/17/2025 0940 by Remigio Franklin RN  Outcome: Progressing  1/17/2025 0323 by Avtar Curran RN  Outcome: Progressing     Problem: Cardiovascular - Adult  Goal: Maintains optimal cardiac output and hemodynamic stability  1/17/2025 0940 by Remigio Franklin RN  Outcome: Progressing  1/17/2025 0323 by Avtar Curran RN  Outcome: 
  Problem: Chronic Conditions and Co-morbidities  Goal: Patient's chronic conditions and co-morbidity symptoms are monitored and maintained or improved  1/17/2025 2035 by Bebe Roblero, RN  Outcome: Progressing  1/17/2025 0940 by Remigio Franklin, RN  Outcome: Progressing     
  Problem: Chronic Conditions and Co-morbidities  Goal: Patient's chronic conditions and co-morbidity symptoms are monitored and maintained or improved  1/18/2025 2136 by Heather Kaur RN  Outcome: Progressing  Flowsheets (Taken 1/18/2025 2136)  Care Plan - Patient's Chronic Conditions and Co-Morbidity Symptoms are Monitored and Maintained or Improved:   Monitor and assess patient's chronic conditions and comorbid symptoms for stability, deterioration, or improvement   Collaborate with multidisciplinary team to address chronic and comorbid conditions and prevent exacerbation or deterioration  1/18/2025 1855 by Elmo Vergara RN  Outcome: Progressing     Problem: Discharge Planning  Goal: Discharge to home or other facility with appropriate resources  1/18/2025 1855 by Elmo Vergara RN  Outcome: Progressing     Problem: Pain  Goal: Verbalizes/displays adequate comfort level or baseline comfort level  1/18/2025 2136 by Heather Kaur RN  Outcome: Progressing  Flowsheets (Taken 1/18/2025 2136)  Verbalizes/displays adequate comfort level or baseline comfort level:   Encourage patient to monitor pain and request assistance   Assess pain using appropriate pain scale  1/18/2025 1855 by Elmo Vergara RN  Outcome: Progressing     Problem: Cardiovascular - Adult  Goal: Maintains optimal cardiac output and hemodynamic stability  1/18/2025 1855 by Elmo Vergara RN  Outcome: Progressing  Goal: Absence of cardiac dysrhythmias or at baseline  1/18/2025 1855 by Elmo Vergara RN  Outcome: Progressing     Problem: Metabolic/Fluid and Electrolytes - Adult  Goal: Electrolytes maintained within normal limits  1/18/2025 1855 by Elmo Vergara RN  Outcome: Progressing     Problem: Hematologic - Adult  Goal: Maintains hematologic stability  1/18/2025 1855 by Elmo Vergara RN  Outcome: Progressing     Problem: ABCDS Injury Assessment  Goal: Absence of physical injury  1/18/2025 1855 by Elmo Vergara RN  Outcome: 
  Problem: Chronic Conditions and Co-morbidities  Goal: Patient's chronic conditions and co-morbidity symptoms are monitored and maintained or improved  Outcome: Progressing     Problem: Discharge Planning  Goal: Discharge to home or other facility with appropriate resources  Outcome: Progressing     Problem: Pain  Goal: Verbalizes/displays adequate comfort level or baseline comfort level  Outcome: Progressing     Problem: Cardiovascular - Adult  Goal: Maintains optimal cardiac output and hemodynamic stability  Outcome: Progressing  Goal: Absence of cardiac dysrhythmias or at baseline  Outcome: Progressing     Problem: Metabolic/Fluid and Electrolytes - Adult  Goal: Electrolytes maintained within normal limits  Outcome: Progressing     Problem: Hematologic - Adult  Goal: Maintains hematologic stability  Outcome: Progressing     
  Problem: Chronic Conditions and Co-morbidities  Goal: Patient's chronic conditions and co-morbidity symptoms are monitored and maintained or improved  Outcome: Progressing     Problem: Discharge Planning  Goal: Discharge to home or other facility with appropriate resources  Outcome: Progressing     Problem: Pain  Goal: Verbalizes/displays adequate comfort level or baseline comfort level  Outcome: Progressing     Problem: Cardiovascular - Adult  Goal: Maintains optimal cardiac output and hemodynamic stability  Outcome: Progressing  Goal: Absence of cardiac dysrhythmias or at baseline  Outcome: Progressing     Problem: Metabolic/Fluid and Electrolytes - Adult  Goal: Electrolytes maintained within normal limits  Outcome: Progressing     Problem: Hematologic - Adult  Goal: Maintains hematologic stability  Outcome: Progressing     Problem: ABCDS Injury Assessment  Goal: Absence of physical injury  Outcome: Progressing     Problem: Skin/Tissue Integrity - Adult  Goal: Skin integrity remains intact  Outcome: Progressing     Problem: Safety - Adult  Goal: Free from fall injury  Outcome: Progressing     Problem: Genitourinary - Adult  Goal: Urinary catheter remains patent  Outcome: Progressing     Problem: Nutrition Deficit:  Goal: Optimize nutritional status  Outcome: Progressing     
  Problem: Chronic Conditions and Co-morbidities  Goal: Patient's chronic conditions and co-morbidity symptoms are monitored and maintained or improved  Outcome: Progressing  Flowsheets (Taken 1/13/2025 2243)  Care Plan - Patient's Chronic Conditions and Co-Morbidity Symptoms are Monitored and Maintained or Improved:   Monitor and assess patient's chronic conditions and comorbid symptoms for stability, deterioration, or improvement   Collaborate with multidisciplinary team to address chronic and comorbid conditions and prevent exacerbation or deterioration   Update acute care plan with appropriate goals if chronic or comorbid symptoms are exacerbated and prevent overall improvement and discharge     Problem: Discharge Planning  Goal: Discharge to home or other facility with appropriate resources  Outcome: Progressing  Flowsheets (Taken 1/13/2025 2243)  Discharge to home or other facility with appropriate resources:   Identify barriers to discharge with patient and caregiver   Identify discharge learning needs (meds, wound care, etc)   Refer to discharge planning if patient needs post-hospital services based on physician order or complex needs related to functional status, cognitive ability or social support system   Arrange for needed discharge resources and transportation as appropriate   Arrange for interpreters to assist at discharge as needed     Problem: Pain  Goal: Verbalizes/displays adequate comfort level or baseline comfort level  Outcome: Progressing  Flowsheets (Taken 1/13/2025 2243)  Verbalizes/displays adequate comfort level or baseline comfort level:   Encourage patient to monitor pain and request assistance   Administer analgesics based on type and severity of pain and evaluate response   Consider cultural and social influences on pain and pain management   Assess pain using appropriate pain scale   Implement non-pharmacological measures as appropriate and evaluate response   Notify Licensed 
  Problem: Chronic Conditions and Co-morbidities  Goal: Patient's chronic conditions and co-morbidity symptoms are monitored and maintained or improved  Outcome: Progressing  Flowsheets (Taken 1/14/2025 2000)  Care Plan - Patient's Chronic Conditions and Co-Morbidity Symptoms are Monitored and Maintained or Improved:   Monitor and assess patient's chronic conditions and comorbid symptoms for stability, deterioration, or improvement   Collaborate with multidisciplinary team to address chronic and comorbid conditions and prevent exacerbation or deterioration   Update acute care plan with appropriate goals if chronic or comorbid symptoms are exacerbated and prevent overall improvement and discharge     Problem: Discharge Planning  Goal: Discharge to home or other facility with appropriate resources  Outcome: Progressing  Flowsheets (Taken 1/14/2025 2000)  Discharge to home or other facility with appropriate resources:   Identify barriers to discharge with patient and caregiver   Arrange for needed discharge resources and transportation as appropriate   Identify discharge learning needs (meds, wound care, etc)   Arrange for interpreters to assist at discharge as needed   Refer to discharge planning if patient needs post-hospital services based on physician order or complex needs related to functional status, cognitive ability or social support system     Problem: Pain  Goal: Verbalizes/displays adequate comfort level or baseline comfort level  Outcome: Progressing     Problem: Cardiovascular - Adult  Goal: Maintains optimal cardiac output and hemodynamic stability  Outcome: Progressing  Flowsheets (Taken 1/14/2025 2000)  Maintains optimal cardiac output and hemodynamic stability:   Monitor blood pressure and heart rate   Monitor urine output and notify Licensed Independent Practitioner for values outside of normal range   Assess for signs of decreased cardiac output   Administer fluid and/or volume expanders as 
  Problem: Chronic Conditions and Co-morbidities  Goal: Patient's chronic conditions and co-morbidity symptoms are monitored and maintained or improved  Outcome: Progressing  Flowsheets (Taken 1/15/2025 2000)  Care Plan - Patient's Chronic Conditions and Co-Morbidity Symptoms are Monitored and Maintained or Improved:   Monitor and assess patient's chronic conditions and comorbid symptoms for stability, deterioration, or improvement   Collaborate with multidisciplinary team to address chronic and comorbid conditions and prevent exacerbation or deterioration   Update acute care plan with appropriate goals if chronic or comorbid symptoms are exacerbated and prevent overall improvement and discharge     Problem: Discharge Planning  Goal: Discharge to home or other facility with appropriate resources  Outcome: Progressing  Flowsheets (Taken 1/15/2025 2000)  Discharge to home or other facility with appropriate resources:   Identify barriers to discharge with patient and caregiver   Arrange for needed discharge resources and transportation as appropriate   Identify discharge learning needs (meds, wound care, etc)   Refer to discharge planning if patient needs post-hospital services based on physician order or complex needs related to functional status, cognitive ability or social support system     Problem: Pain  Goal: Verbalizes/displays adequate comfort level or baseline comfort level  Outcome: Progressing     Problem: Cardiovascular - Adult  Goal: Maintains optimal cardiac output and hemodynamic stability  Outcome: Progressing  Goal: Absence of cardiac dysrhythmias or at baseline  Outcome: Progressing     Problem: Metabolic/Fluid and Electrolytes - Adult  Goal: Electrolytes maintained within normal limits  Outcome: Progressing  Flowsheets (Taken 1/15/2025 2000)  Electrolytes maintained within normal limits: Monitor labs and assess patient for signs and symptoms of electrolyte imbalances     Problem: Hematologic - 
  Problem: Chronic Conditions and Co-morbidities  Goal: Patient's chronic conditions and co-morbidity symptoms are monitored and maintained or improved  Outcome: Progressing  Flowsheets (Taken 1/16/2025 2000)  Care Plan - Patient's Chronic Conditions and Co-Morbidity Symptoms are Monitored and Maintained or Improved:   Collaborate with multidisciplinary team to address chronic and comorbid conditions and prevent exacerbation or deterioration   Monitor and assess patient's chronic conditions and comorbid symptoms for stability, deterioration, or improvement   Update acute care plan with appropriate goals if chronic or comorbid symptoms are exacerbated and prevent overall improvement and discharge     Problem: Discharge Planning  Goal: Discharge to home or other facility with appropriate resources  Outcome: Progressing  Flowsheets (Taken 1/16/2025 2000)  Discharge to home or other facility with appropriate resources:   Identify barriers to discharge with patient and caregiver   Arrange for needed discharge resources and transportation as appropriate   Identify discharge learning needs (meds, wound care, etc)   Refer to discharge planning if patient needs post-hospital services based on physician order or complex needs related to functional status, cognitive ability or social support system     Problem: Pain  Goal: Verbalizes/displays adequate comfort level or baseline comfort level  Outcome: Progressing     Problem: Cardiovascular - Adult  Goal: Maintains optimal cardiac output and hemodynamic stability  Outcome: Progressing  Flowsheets (Taken 1/16/2025 2000)  Maintains optimal cardiac output and hemodynamic stability:   Monitor blood pressure and heart rate   Monitor urine output and notify Licensed Independent Practitioner for values outside of normal range   Assess for signs of decreased cardiac output   Administer fluid and/or volume expanders as ordered  Goal: Absence of cardiac dysrhythmias or at 
4 Eyes Skin Assessment     NAME:  Dawood Epps  YOB: 1955  MEDICAL RECORD NUMBER:  [id number]    The patient is being assessed for  Admission    I agree that at least one RN has performed a thorough Head to Toe Skin Assessment on the patient. ALL assessment sites listed below have been assessed.      Areas assessed by both nurses:    Head, Face, Ears, Shoulders, Back, Chest, Arms, Elbows, Hands, Sacrum. Buttock, Coccyx, Ischium, Legs. Feet and Heels, and Under Medical Devices         Does the Patient have a Wound? No noted wound(s)       Martinez Prevention initiated by RN: No  Wound Care Orders initiated by RN: No    Pressure Injury (Stage 3,4, Unstageable, DTI, NWPT, and Complex wounds) if present, place Wound referral order by RN under : No    New Ostomies, if present place, Ostomy referral order under : No     Nurse 1 eSignature: Electronically signed by Jose C Sears RN on 1/12/25 at 6:50 AM EST    **SHARE this note so that the co-signing nurse can place an eSignature**    Nurse 2 eSignature: Electronically signed by Nseha Thakur RN on 1/12/25 at 6:51 AM EST    
Increase patients ADLs/functional status to baseline.    
PT eval complete. Increase function to baseline.    
Reviewed plan of care including dc barriers  
Progressing  1/15/2025 0532 by Avtar Curran, RN  Outcome: Progressing  Flowsheets (Taken 1/14/2025 2000)  Maintains optimal cardiac output and hemodynamic stability:   Monitor blood pressure and heart rate   Monitor urine output and notify Licensed Independent Practitioner for values outside of normal range   Assess for signs of decreased cardiac output   Administer fluid and/or volume expanders as ordered   Administer vasoactive medications as ordered  Goal: Absence of cardiac dysrhythmias or at baseline  1/15/2025 1015 by Nesha Mares RN  Outcome: Progressing  1/15/2025 0532 by Avtar Curran, RN  Outcome: Progressing  Flowsheets (Taken 1/14/2025 2000)  Absence of cardiac dysrhythmias or at baseline:   Monitor cardiac rate and rhythm   Assess for signs of decreased cardiac output   Administer antiarrhythmia medication and electrolyte replacement as ordered

## 2025-01-20 ENCOUNTER — APPOINTMENT (OUTPATIENT)
Dept: GENERAL RADIOLOGY | Age: 70
DRG: 234 | End: 2025-01-20
Payer: MEDICARE

## 2025-01-20 ENCOUNTER — APPOINTMENT (OUTPATIENT)
Dept: VASCULAR LAB | Age: 70
DRG: 234 | End: 2025-01-20
Payer: MEDICARE

## 2025-01-20 DIAGNOSIS — J43.9 BULLOUS EMPHYSEMA (HCC): ICD-10-CM

## 2025-01-20 LAB
ANION GAP SERPL CALCULATED.3IONS-SCNC: 8 MMOL/L (ref 3–16)
BASE EXCESS BLDA CALC-SCNC: -4 MMOL/L (ref -3–3)
BUN SERPL-MCNC: 30 MG/DL (ref 7–20)
CA-I BLD-SCNC: 1.29 MMOL/L (ref 1.12–1.32)
CALCIUM SERPL-MCNC: 9.4 MG/DL (ref 8.3–10.6)
CHLORIDE SERPL-SCNC: 102 MMOL/L (ref 99–110)
CO2 BLDA-SCNC: 24 MMOL/L
CO2 SERPL-SCNC: 29 MMOL/L (ref 21–32)
CREAT SERPL-MCNC: 1.5 MG/DL (ref 0.8–1.3)
DEPRECATED RDW RBC AUTO: 15.5 % (ref 12.4–15.4)
GFR SERPLBLD CREATININE-BSD FMLA CKD-EPI: 49 ML/MIN/{1.73_M2}
GLUCOSE BLD-MCNC: 102 MG/DL (ref 70–99)
GLUCOSE BLD-MCNC: 127 MG/DL (ref 70–99)
GLUCOSE BLD-MCNC: 128 MG/DL (ref 70–99)
GLUCOSE BLD-MCNC: 89 MG/DL (ref 70–99)
GLUCOSE SERPL-MCNC: 103 MG/DL (ref 70–99)
HCO3 BLDA-SCNC: 22.4 MMOL/L (ref 21–29)
HCT VFR BLD AUTO: 25.3 % (ref 40.5–52.5)
HCT VFR BLD AUTO: 33 % (ref 40.5–52.5)
HGB BLD CALC-MCNC: 11.1 GM/DL (ref 13.5–17.5)
HGB BLD-MCNC: 8.7 G/DL (ref 13.5–17.5)
INR PPP: 1.18 (ref 0.85–1.15)
LACTATE BLD-SCNC: 1.64 MMOL/L (ref 0.4–2)
MAGNESIUM SERPL-MCNC: 2.35 MG/DL (ref 1.8–2.4)
MCH RBC QN AUTO: 30.4 PG (ref 26–34)
MCHC RBC AUTO-ENTMCNC: 34.3 G/DL (ref 31–36)
MCV RBC AUTO: 88.6 FL (ref 80–100)
PCO2 BLDA: 43.7 MM HG (ref 35–45)
PERFORMED ON: ABNORMAL
PERFORMED ON: NORMAL
PH BLDA: 7.32 [PH] (ref 7.35–7.45)
PLATELET # BLD AUTO: 254 K/UL (ref 135–450)
PMV BLD AUTO: 8.9 FL (ref 5–10.5)
PO2 BLDA: 96.2 MM HG (ref 75–108)
POC SAMPLE TYPE: ABNORMAL
POTASSIUM BLD-SCNC: 5.1 MMOL/L (ref 3.5–5.1)
POTASSIUM SERPL-SCNC: 4.2 MMOL/L (ref 3.5–5.1)
PROTHROMBIN TIME: 15.2 SEC (ref 11.9–14.9)
RBC # BLD AUTO: 2.85 M/UL (ref 4.2–5.9)
SAO2 % BLDA: 97 % (ref 93–100)
SODIUM BLD-SCNC: 147 MMOL/L (ref 136–145)
SODIUM SERPL-SCNC: 139 MMOL/L (ref 136–145)
WBC # BLD AUTO: 7.2 K/UL (ref 4–11)

## 2025-01-20 PROCEDURE — 6360000002 HC RX W HCPCS

## 2025-01-20 PROCEDURE — 2100000000 HC CCU R&B

## 2025-01-20 PROCEDURE — 94640 AIRWAY INHALATION TREATMENT: CPT

## 2025-01-20 PROCEDURE — 6370000000 HC RX 637 (ALT 250 FOR IP)

## 2025-01-20 PROCEDURE — 97530 THERAPEUTIC ACTIVITIES: CPT

## 2025-01-20 PROCEDURE — 93970 EXTREMITY STUDY: CPT

## 2025-01-20 PROCEDURE — 2500000003 HC RX 250 WO HCPCS

## 2025-01-20 PROCEDURE — 83735 ASSAY OF MAGNESIUM: CPT

## 2025-01-20 PROCEDURE — 36415 COLL VENOUS BLD VENIPUNCTURE: CPT

## 2025-01-20 PROCEDURE — 85610 PROTHROMBIN TIME: CPT

## 2025-01-20 PROCEDURE — 80048 BASIC METABOLIC PNL TOTAL CA: CPT

## 2025-01-20 PROCEDURE — 97110 THERAPEUTIC EXERCISES: CPT

## 2025-01-20 PROCEDURE — 6370000000 HC RX 637 (ALT 250 FOR IP): Performed by: THORACIC SURGERY (CARDIOTHORACIC VASCULAR SURGERY)

## 2025-01-20 PROCEDURE — 94669 MECHANICAL CHEST WALL OSCILL: CPT

## 2025-01-20 PROCEDURE — 85027 COMPLETE CBC AUTOMATED: CPT

## 2025-01-20 PROCEDURE — 71045 X-RAY EXAM CHEST 1 VIEW: CPT

## 2025-01-20 PROCEDURE — 99024 POSTOP FOLLOW-UP VISIT: CPT

## 2025-01-20 RX ORDER — ALBUTEROL SULFATE 90 UG/1
INHALANT RESPIRATORY (INHALATION)
Qty: 18 G | Refills: 3 | OUTPATIENT
Start: 2025-01-20

## 2025-01-20 RX ORDER — FERROUS SULFATE 325(65) MG
325 TABLET ORAL 2 TIMES DAILY WITH MEALS
Status: DISCONTINUED | OUTPATIENT
Start: 2025-01-20 | End: 2025-01-21 | Stop reason: HOSPADM

## 2025-01-20 RX ORDER — ALBUTEROL SULFATE 0.83 MG/ML
2.5 SOLUTION RESPIRATORY (INHALATION) EVERY 4 HOURS PRN
Status: DISCONTINUED | OUTPATIENT
Start: 2025-01-20 | End: 2025-01-21

## 2025-01-20 RX ORDER — METOPROLOL TARTRATE 25 MG/1
37.5 TABLET, FILM COATED ORAL 2 TIMES DAILY
Status: DISCONTINUED | OUTPATIENT
Start: 2025-01-20 | End: 2025-01-21 | Stop reason: HOSPADM

## 2025-01-20 RX ADMIN — POTASSIUM CHLORIDE 10 MEQ: 750 TABLET, EXTENDED RELEASE ORAL at 17:10

## 2025-01-20 RX ADMIN — METOPROLOL TARTRATE 37.5 MG: 25 TABLET, FILM COATED ORAL at 08:42

## 2025-01-20 RX ADMIN — FUROSEMIDE 20 MG: 20 TABLET ORAL at 08:41

## 2025-01-20 RX ADMIN — ACETAMINOPHEN 1000 MG: 500 TABLET ORAL at 11:55

## 2025-01-20 RX ADMIN — METHOCARBAMOL TABLETS 750 MG: 750 TABLET, COATED ORAL at 20:32

## 2025-01-20 RX ADMIN — INSULIN GLARGINE 12 UNITS: 100 INJECTION, SOLUTION SUBCUTANEOUS at 20:35

## 2025-01-20 RX ADMIN — METHOCARBAMOL TABLETS 750 MG: 750 TABLET, COATED ORAL at 17:10

## 2025-01-20 RX ADMIN — SODIUM CHLORIDE, PRESERVATIVE FREE 10 ML: 5 INJECTION INTRAVENOUS at 08:42

## 2025-01-20 RX ADMIN — HEPARIN SODIUM 5000 UNITS: 5000 INJECTION INTRAVENOUS; SUBCUTANEOUS at 13:29

## 2025-01-20 RX ADMIN — ACETAMINOPHEN 1000 MG: 500 TABLET ORAL at 06:16

## 2025-01-20 RX ADMIN — SODIUM CHLORIDE, PRESERVATIVE FREE 10 ML: 5 INJECTION INTRAVENOUS at 20:33

## 2025-01-20 RX ADMIN — BISACODYL 5 MG: 5 TABLET, COATED ORAL at 08:42

## 2025-01-20 RX ADMIN — METHOCARBAMOL TABLETS 750 MG: 750 TABLET, COATED ORAL at 08:41

## 2025-01-20 RX ADMIN — HEPARIN SODIUM 5000 UNITS: 5000 INJECTION INTRAVENOUS; SUBCUTANEOUS at 06:15

## 2025-01-20 RX ADMIN — OXYCODONE 10 MG: 5 TABLET ORAL at 20:31

## 2025-01-20 RX ADMIN — ATORVASTATIN CALCIUM 40 MG: 40 TABLET, FILM COATED ORAL at 20:32

## 2025-01-20 RX ADMIN — POTASSIUM CHLORIDE 10 MEQ: 750 TABLET, EXTENDED RELEASE ORAL at 08:42

## 2025-01-20 RX ADMIN — FLUTICASONE PROPIONATE 2 SPRAY: 50 SPRAY, METERED NASAL at 08:43

## 2025-01-20 RX ADMIN — FERROUS SULFATE TAB 325 MG (65 MG ELEMENTAL FE) 325 MG: 325 (65 FE) TAB at 17:10

## 2025-01-20 RX ADMIN — ACETAMINOPHEN 1000 MG: 500 TABLET ORAL at 18:02

## 2025-01-20 RX ADMIN — ALBUTEROL SULFATE 2.5 MG: 2.5 SOLUTION RESPIRATORY (INHALATION) at 08:16

## 2025-01-20 RX ADMIN — POTASSIUM CHLORIDE 10 MEQ: 750 TABLET, EXTENDED RELEASE ORAL at 11:56

## 2025-01-20 RX ADMIN — ASPIRIN 81 MG 81 MG: 81 TABLET ORAL at 08:42

## 2025-01-20 RX ADMIN — CHLORHEXIDINE GLUCONATE 15 ML: 1.2 RINSE ORAL at 08:46

## 2025-01-20 RX ADMIN — CLOPIDOGREL BISULFATE 75 MG: 75 TABLET, FILM COATED ORAL at 08:41

## 2025-01-20 RX ADMIN — FAMOTIDINE 20 MG: 20 TABLET, FILM COATED ORAL at 08:42

## 2025-01-20 RX ADMIN — OXYCODONE 10 MG: 5 TABLET ORAL at 10:31

## 2025-01-20 RX ADMIN — OXYCODONE 10 MG: 5 TABLET ORAL at 02:48

## 2025-01-20 RX ADMIN — HYDROXYZINE HYDROCHLORIDE 10 MG: 10 TABLET ORAL at 08:41

## 2025-01-20 RX ADMIN — Medication 400 MG: at 20:31

## 2025-01-20 RX ADMIN — CHLORHEXIDINE GLUCONATE 15 ML: 1.2 RINSE ORAL at 20:30

## 2025-01-20 RX ADMIN — FERROUS SULFATE TAB 325 MG (65 MG ELEMENTAL FE) 325 MG: 325 (65 FE) TAB at 11:56

## 2025-01-20 RX ADMIN — METOPROLOL TARTRATE 37.5 MG: 25 TABLET, FILM COATED ORAL at 20:31

## 2025-01-20 RX ADMIN — Medication 400 MG: at 08:42

## 2025-01-20 RX ADMIN — METHOCARBAMOL TABLETS 750 MG: 750 TABLET, COATED ORAL at 13:29

## 2025-01-20 RX ADMIN — ACETAMINOPHEN 1000 MG: 500 TABLET ORAL at 00:00

## 2025-01-20 ASSESSMENT — PAIN DESCRIPTION - DESCRIPTORS
DESCRIPTORS: ACHING;SORE
DESCRIPTORS: SORE;ACHING
DESCRIPTORS: ACHING;SORE

## 2025-01-20 ASSESSMENT — PAIN DESCRIPTION - ORIENTATION
ORIENTATION: MID
ORIENTATION: RIGHT
ORIENTATION: MID

## 2025-01-20 ASSESSMENT — PAIN DESCRIPTION - LOCATION
LOCATION: CHEST
LOCATION: SHOULDER
LOCATION: CHEST
LOCATION: STERNUM
LOCATION: STERNUM
LOCATION: CHEST

## 2025-01-20 ASSESSMENT — PAIN SCALES - GENERAL
PAINLEVEL_OUTOF10: 3
PAINLEVEL_OUTOF10: 4
PAINLEVEL_OUTOF10: 5
PAINLEVEL_OUTOF10: 8
PAINLEVEL_OUTOF10: 5
PAINLEVEL_OUTOF10: 4
PAINLEVEL_OUTOF10: 5
PAINLEVEL_OUTOF10: 2
PAINLEVEL_OUTOF10: 7
PAINLEVEL_OUTOF10: 5
PAINLEVEL_OUTOF10: 5

## 2025-01-20 NOTE — CARE COORDINATION
[facility] Acute [facility]   After review, this patient is felt to be:       [x]  Appropriate for Acute Inpatient Rehab    []  Appropriate for Acute Inpatient Rehab Pending Insurance Authorization    []  Not appropriate for Acute Inpatient Rehab    []  Referral received and ARU reviewing patient; Evaluation ongoing.     Patient approval received. Writer spoke with patient this morning. He states he is willing to come for IPR but feels he could not fully participate at this point. He states he is too short of breath and states he is not getting his usual prescribed inhaler (Trelegy). He states he is having his wife bring it in from home. He states he would like IPR to follow him so that when he feels ready he can transfer.       to follow.     Will notify DCP with further updates. Thank you for the referral.     YUAN ALDRIDGE RN

## 2025-01-20 NOTE — CARE COORDINATION
Spoke to BRIGID Cortés with CTS who states that the Pt may be ready for d/c tomorrow pending progress. Pt has c/o SOB and fatigue/weakness. Poss. U/S BLE to r/o DVT. Pt has also been off of home Trelegy inhaler since admission. Pt's spouse bringing it in today.     To ARU when medically ready. Will follow.

## 2025-01-20 NOTE — CARE COORDINATION
Pt has pre cert approval to [facility]. Message to CTS to inquire if Pt is medically ready for d/c.

## 2025-01-21 ENCOUNTER — TELEPHONE (OUTPATIENT)
Dept: PULMONOLOGY | Age: 70
End: 2025-01-21

## 2025-01-21 ENCOUNTER — HOSPITAL ENCOUNTER (INPATIENT)
Age: 70
LOS: 8 days | Discharge: HOME HEALTH | DRG: 949 | End: 2025-01-29
Attending: STUDENT IN AN ORGANIZED HEALTH CARE EDUCATION/TRAINING PROGRAM | Admitting: STUDENT IN AN ORGANIZED HEALTH CARE EDUCATION/TRAINING PROGRAM
Payer: COMMERCIAL

## 2025-01-21 ENCOUNTER — APPOINTMENT (OUTPATIENT)
Dept: GENERAL RADIOLOGY | Age: 70
DRG: 234 | End: 2025-01-21
Payer: MEDICARE

## 2025-01-21 VITALS
OXYGEN SATURATION: 92 % | TEMPERATURE: 98.9 F | HEART RATE: 97 BPM | HEIGHT: 73 IN | WEIGHT: 173.72 LBS | RESPIRATION RATE: 17 BRPM | BODY MASS INDEX: 23.02 KG/M2 | DIASTOLIC BLOOD PRESSURE: 58 MMHG | SYSTOLIC BLOOD PRESSURE: 110 MMHG

## 2025-01-21 DIAGNOSIS — Z95.1 S/P CABG (CORONARY ARTERY BYPASS GRAFT): Primary | ICD-10-CM

## 2025-01-21 LAB
ANION GAP SERPL CALCULATED.3IONS-SCNC: 12 MMOL/L (ref 3–16)
BASOPHILS # BLD: 0.1 K/UL (ref 0–0.2)
BASOPHILS NFR BLD: 0.6 %
BUN SERPL-MCNC: 29 MG/DL (ref 7–20)
CALCIUM SERPL-MCNC: 10.3 MG/DL (ref 8.3–10.6)
CHLORIDE SERPL-SCNC: 102 MMOL/L (ref 99–110)
CO2 SERPL-SCNC: 22 MMOL/L (ref 21–32)
CREAT SERPL-MCNC: 1.4 MG/DL (ref 0.8–1.3)
DEPRECATED RDW RBC AUTO: 17 % (ref 12.4–15.4)
ECHO BSA: 2.06 M2
EOSINOPHIL # BLD: 0.6 K/UL (ref 0–0.6)
EOSINOPHIL NFR BLD: 6 %
GFR SERPLBLD CREATININE-BSD FMLA CKD-EPI: 54 ML/MIN/{1.73_M2}
GLUCOSE BLD-MCNC: 101 MG/DL (ref 70–99)
GLUCOSE BLD-MCNC: 102 MG/DL (ref 70–99)
GLUCOSE BLD-MCNC: 116 MG/DL (ref 70–99)
GLUCOSE SERPL-MCNC: 96 MG/DL (ref 70–99)
HCT VFR BLD AUTO: 31.7 % (ref 40.5–52.5)
HGB BLD-MCNC: 10.1 G/DL (ref 13.5–17.5)
LYMPHOCYTES # BLD: 2 K/UL (ref 1–5.1)
LYMPHOCYTES NFR BLD: 21.2 %
MCH RBC QN AUTO: 30.3 PG (ref 26–34)
MCHC RBC AUTO-ENTMCNC: 32 G/DL (ref 31–36)
MCV RBC AUTO: 94.7 FL (ref 80–100)
MONOCYTES # BLD: 1.2 K/UL (ref 0–1.3)
MONOCYTES NFR BLD: 12.7 %
NEUTROPHILS # BLD: 5.7 K/UL (ref 1.7–7.7)
NEUTROPHILS NFR BLD: 59.5 %
PERFORMED ON: ABNORMAL
PLATELET # BLD AUTO: 366 K/UL (ref 135–450)
PMV BLD AUTO: 8.1 FL (ref 5–10.5)
POTASSIUM SERPL-SCNC: 4.2 MMOL/L (ref 3.5–5.1)
RBC # BLD AUTO: 3.34 M/UL (ref 4.2–5.9)
SODIUM SERPL-SCNC: 136 MMOL/L (ref 136–145)
WBC # BLD AUTO: 9.5 K/UL (ref 4–11)

## 2025-01-21 PROCEDURE — 6360000002 HC RX W HCPCS

## 2025-01-21 PROCEDURE — 6370000000 HC RX 637 (ALT 250 FOR IP)

## 2025-01-21 PROCEDURE — 6360000002 HC RX W HCPCS: Performed by: STUDENT IN AN ORGANIZED HEALTH CARE EDUCATION/TRAINING PROGRAM

## 2025-01-21 PROCEDURE — 97535 SELF CARE MNGMENT TRAINING: CPT

## 2025-01-21 PROCEDURE — 6370000000 HC RX 637 (ALT 250 FOR IP): Performed by: THORACIC SURGERY (CARDIOTHORACIC VASCULAR SURGERY)

## 2025-01-21 PROCEDURE — 6370000000 HC RX 637 (ALT 250 FOR IP): Performed by: STUDENT IN AN ORGANIZED HEALTH CARE EDUCATION/TRAINING PROGRAM

## 2025-01-21 PROCEDURE — 94640 AIRWAY INHALATION TREATMENT: CPT

## 2025-01-21 PROCEDURE — 85025 COMPLETE CBC W/AUTO DIFF WBC: CPT

## 2025-01-21 PROCEDURE — 6360000002 HC RX W HCPCS: Performed by: INTERNAL MEDICINE

## 2025-01-21 PROCEDURE — 97116 GAIT TRAINING THERAPY: CPT

## 2025-01-21 PROCEDURE — 99024 POSTOP FOLLOW-UP VISIT: CPT

## 2025-01-21 PROCEDURE — 94669 MECHANICAL CHEST WALL OSCILL: CPT

## 2025-01-21 PROCEDURE — 97530 THERAPEUTIC ACTIVITIES: CPT

## 2025-01-21 PROCEDURE — 80048 BASIC METABOLIC PNL TOTAL CA: CPT

## 2025-01-21 PROCEDURE — 93970 EXTREMITY STUDY: CPT | Performed by: INTERNAL MEDICINE

## 2025-01-21 PROCEDURE — 99222 1ST HOSP IP/OBS MODERATE 55: CPT | Performed by: INTERNAL MEDICINE

## 2025-01-21 PROCEDURE — 1280000000 HC REHAB R&B

## 2025-01-21 PROCEDURE — 71045 X-RAY EXAM CHEST 1 VIEW: CPT

## 2025-01-21 PROCEDURE — 2500000003 HC RX 250 WO HCPCS

## 2025-01-21 PROCEDURE — 36415 COLL VENOUS BLD VENIPUNCTURE: CPT

## 2025-01-21 RX ORDER — SODIUM CHLORIDE 9 MG/ML
INJECTION, SOLUTION INTRAVENOUS PRN
Status: DISCONTINUED | OUTPATIENT
Start: 2025-01-21 | End: 2025-01-29 | Stop reason: HOSPADM

## 2025-01-21 RX ORDER — LANOLIN ALCOHOL/MO/W.PET/CERES
400 CREAM (GRAM) TOPICAL DAILY
Status: ON HOLD | DISCHARGE
Start: 2025-01-21 | End: 2025-01-25

## 2025-01-21 RX ORDER — ACETYLCYSTEINE 200 MG/ML
600 SOLUTION ORAL; RESPIRATORY (INHALATION)
Status: ON HOLD | DISCHARGE
Start: 2025-01-21

## 2025-01-21 RX ORDER — OXYCODONE HYDROCHLORIDE 5 MG/1
5 TABLET ORAL EVERY 4 HOURS PRN
Status: CANCELLED | OUTPATIENT
Start: 2025-01-21

## 2025-01-21 RX ORDER — ACETYLCYSTEINE 200 MG/ML
600 SOLUTION ORAL; RESPIRATORY (INHALATION)
Status: DISCONTINUED | OUTPATIENT
Start: 2025-01-22 | End: 2025-01-27

## 2025-01-21 RX ORDER — METOPROLOL TARTRATE 25 MG/1
37.5 TABLET, FILM COATED ORAL 2 TIMES DAILY
Status: DISCONTINUED | OUTPATIENT
Start: 2025-01-21 | End: 2025-01-27

## 2025-01-21 RX ORDER — SODIUM CHLORIDE 0.9 % (FLUSH) 0.9 %
5-40 SYRINGE (ML) INJECTION EVERY 12 HOURS SCHEDULED
Status: DISCONTINUED | OUTPATIENT
Start: 2025-01-21 | End: 2025-01-22

## 2025-01-21 RX ORDER — POTASSIUM CHLORIDE 750 MG/1
10 TABLET, EXTENDED RELEASE ORAL DAILY
Status: ON HOLD | DISCHARGE
Start: 2025-01-21 | End: 2025-01-25

## 2025-01-21 RX ORDER — POTASSIUM CHLORIDE 750 MG/1
10 TABLET, EXTENDED RELEASE ORAL DAILY
Status: CANCELLED | OUTPATIENT
Start: 2025-01-22 | End: 2025-01-26

## 2025-01-21 RX ORDER — ACETAMINOPHEN 500 MG
1000 TABLET ORAL EVERY 8 HOURS SCHEDULED
Status: CANCELLED | OUTPATIENT
Start: 2025-01-21

## 2025-01-21 RX ORDER — FUROSEMIDE 20 MG/1
20 TABLET ORAL DAILY
Status: COMPLETED | OUTPATIENT
Start: 2025-01-22 | End: 2025-01-25

## 2025-01-21 RX ORDER — OXYCODONE HYDROCHLORIDE 5 MG/1
5 TABLET ORAL EVERY 6 HOURS PRN
Status: ON HOLD | OUTPATIENT
Start: 2025-01-21 | End: 2025-01-28

## 2025-01-21 RX ORDER — METOPROLOL TARTRATE 37.5 MG/1
37.5 TABLET ORAL 2 TIMES DAILY
Status: ON HOLD | DISCHARGE
Start: 2025-01-21

## 2025-01-21 RX ORDER — FAMOTIDINE 20 MG/1
20 TABLET, FILM COATED ORAL DAILY
Status: CANCELLED | OUTPATIENT
Start: 2025-01-22

## 2025-01-21 RX ORDER — HEPARIN SODIUM 5000 [USP'U]/ML
5000 INJECTION, SOLUTION INTRAVENOUS; SUBCUTANEOUS EVERY 8 HOURS SCHEDULED
Status: CANCELLED | OUTPATIENT
Start: 2025-01-21

## 2025-01-21 RX ORDER — ALBUTEROL SULFATE 0.83 MG/ML
2.5 SOLUTION RESPIRATORY (INHALATION) 2 TIMES DAILY
Status: CANCELLED | OUTPATIENT
Start: 2025-01-21

## 2025-01-21 RX ORDER — CLOPIDOGREL BISULFATE 75 MG/1
75 TABLET ORAL DAILY
Status: DISCONTINUED | OUTPATIENT
Start: 2025-01-22 | End: 2025-01-29 | Stop reason: HOSPADM

## 2025-01-21 RX ORDER — OXYCODONE HYDROCHLORIDE 5 MG/1
10 TABLET ORAL EVERY 4 HOURS PRN
Status: CANCELLED | OUTPATIENT
Start: 2025-01-21

## 2025-01-21 RX ORDER — PREGABALIN 25 MG/1
50 CAPSULE ORAL 2 TIMES DAILY
Status: DISCONTINUED | OUTPATIENT
Start: 2025-01-21 | End: 2025-01-21 | Stop reason: HOSPADM

## 2025-01-21 RX ORDER — METHOCARBAMOL 750 MG/1
750 TABLET, FILM COATED ORAL 4 TIMES DAILY
Status: ON HOLD | DISCHARGE
Start: 2025-01-21 | End: 2025-01-31

## 2025-01-21 RX ORDER — LANOLIN ALCOHOL/MO/W.PET/CERES
400 CREAM (GRAM) TOPICAL DAILY
Status: COMPLETED | OUTPATIENT
Start: 2025-01-22 | End: 2025-01-25

## 2025-01-21 RX ORDER — ALBUTEROL SULFATE 0.83 MG/ML
2.5 SOLUTION RESPIRATORY (INHALATION) EVERY 4 HOURS PRN
Status: DISCONTINUED | OUTPATIENT
Start: 2025-01-21 | End: 2025-01-21 | Stop reason: HOSPADM

## 2025-01-21 RX ORDER — DEXTROSE MONOHYDRATE 100 MG/ML
INJECTION, SOLUTION INTRAVENOUS CONTINUOUS PRN
Status: DISCONTINUED | OUTPATIENT
Start: 2025-01-21 | End: 2025-01-29 | Stop reason: HOSPADM

## 2025-01-21 RX ORDER — BISACODYL 5 MG/1
5 TABLET, DELAYED RELEASE ORAL DAILY
Status: CANCELLED | OUTPATIENT
Start: 2025-01-22

## 2025-01-21 RX ORDER — METHOCARBAMOL 750 MG/1
750 TABLET, FILM COATED ORAL 4 TIMES DAILY
Status: DISCONTINUED | OUTPATIENT
Start: 2025-01-21 | End: 2025-01-29 | Stop reason: HOSPADM

## 2025-01-21 RX ORDER — ONDANSETRON 2 MG/ML
4 INJECTION INTRAMUSCULAR; INTRAVENOUS EVERY 6 HOURS PRN
Status: DISCONTINUED | OUTPATIENT
Start: 2025-01-21 | End: 2025-01-22

## 2025-01-21 RX ORDER — BISACODYL 5 MG/1
5 TABLET, DELAYED RELEASE ORAL DAILY
Status: DISCONTINUED | OUTPATIENT
Start: 2025-01-22 | End: 2025-01-29 | Stop reason: HOSPADM

## 2025-01-21 RX ORDER — ACETYLCYSTEINE 200 MG/ML
600 SOLUTION ORAL; RESPIRATORY (INHALATION)
Status: DISCONTINUED | OUTPATIENT
Start: 2025-01-21 | End: 2025-01-21 | Stop reason: HOSPADM

## 2025-01-21 RX ORDER — FUROSEMIDE 20 MG/1
20 TABLET ORAL DAILY
Refills: 0 | Status: ON HOLD | DISCHARGE
Start: 2025-01-21 | End: 2025-01-25

## 2025-01-21 RX ORDER — FLUTICASONE PROPIONATE 50 MCG
2 SPRAY, SUSPENSION (ML) NASAL DAILY PRN
Status: CANCELLED | OUTPATIENT
Start: 2025-01-21

## 2025-01-21 RX ORDER — CLOPIDOGREL BISULFATE 75 MG/1
75 TABLET ORAL DAILY
Status: ON HOLD | DISCHARGE
Start: 2025-01-22

## 2025-01-21 RX ORDER — ATORVASTATIN CALCIUM 40 MG/1
40 TABLET, FILM COATED ORAL NIGHTLY
Status: ON HOLD | DISCHARGE
Start: 2025-01-21

## 2025-01-21 RX ORDER — ATORVASTATIN CALCIUM 40 MG/1
40 TABLET, FILM COATED ORAL NIGHTLY
Status: CANCELLED | OUTPATIENT
Start: 2025-01-21

## 2025-01-21 RX ORDER — ATORVASTATIN CALCIUM 40 MG/1
40 TABLET, FILM COATED ORAL NIGHTLY
Status: DISCONTINUED | OUTPATIENT
Start: 2025-01-21 | End: 2025-01-29 | Stop reason: HOSPADM

## 2025-01-21 RX ORDER — POTASSIUM CHLORIDE 750 MG/1
10 TABLET, EXTENDED RELEASE ORAL DAILY
Status: COMPLETED | OUTPATIENT
Start: 2025-01-22 | End: 2025-01-25

## 2025-01-21 RX ORDER — ALBUTEROL SULFATE 0.83 MG/ML
2.5 SOLUTION RESPIRATORY (INHALATION) EVERY 6 HOURS PRN
Qty: 240 ML | Refills: 3 | Status: ON HOLD | OUTPATIENT
Start: 2025-01-21 | End: 2025-04-11

## 2025-01-21 RX ORDER — SODIUM CHLORIDE 0.9 % (FLUSH) 0.9 %
5-40 SYRINGE (ML) INJECTION PRN
Status: CANCELLED | OUTPATIENT
Start: 2025-01-21

## 2025-01-21 RX ORDER — ASPIRIN 81 MG/1
81 TABLET, CHEWABLE ORAL DAILY
Status: ON HOLD | DISCHARGE
Start: 2025-01-22

## 2025-01-21 RX ORDER — CLOPIDOGREL BISULFATE 75 MG/1
75 TABLET ORAL DAILY
Status: CANCELLED | OUTPATIENT
Start: 2025-01-22

## 2025-01-21 RX ORDER — HYDROXYZINE HYDROCHLORIDE 10 MG/1
10 TABLET, FILM COATED ORAL 3 TIMES DAILY PRN
Status: CANCELLED | OUTPATIENT
Start: 2025-01-21

## 2025-01-21 RX ORDER — SODIUM CHLORIDE 0.9 % (FLUSH) 0.9 %
5-40 SYRINGE (ML) INJECTION EVERY 12 HOURS SCHEDULED
Status: CANCELLED | OUTPATIENT
Start: 2025-01-21

## 2025-01-21 RX ORDER — ALBUTEROL SULFATE 0.83 MG/ML
2.5 SOLUTION RESPIRATORY (INHALATION) 2 TIMES DAILY
Status: DISCONTINUED | OUTPATIENT
Start: 2025-01-22 | End: 2025-01-29 | Stop reason: HOSPADM

## 2025-01-21 RX ORDER — HYDROXYZINE HYDROCHLORIDE 10 MG/1
10 TABLET, FILM COATED ORAL 3 TIMES DAILY PRN
Status: DISCONTINUED | OUTPATIENT
Start: 2025-01-21 | End: 2025-01-29 | Stop reason: HOSPADM

## 2025-01-21 RX ORDER — HYDRALAZINE HYDROCHLORIDE 10 MG/1
10 TABLET, FILM COATED ORAL EVERY 6 HOURS PRN
Status: CANCELLED | OUTPATIENT
Start: 2025-01-21

## 2025-01-21 RX ORDER — PREGABALIN 25 MG/1
50 CAPSULE ORAL 2 TIMES DAILY
Status: DISCONTINUED | OUTPATIENT
Start: 2025-01-21 | End: 2025-01-29 | Stop reason: HOSPADM

## 2025-01-21 RX ORDER — FLUTICASONE PROPIONATE 50 MCG
2 SPRAY, SUSPENSION (ML) NASAL DAILY PRN
Status: DISCONTINUED | OUTPATIENT
Start: 2025-01-21 | End: 2025-01-29 | Stop reason: HOSPADM

## 2025-01-21 RX ORDER — ONDANSETRON 4 MG/1
4 TABLET, ORALLY DISINTEGRATING ORAL EVERY 8 HOURS PRN
Status: DISCONTINUED | OUTPATIENT
Start: 2025-01-21 | End: 2025-01-29 | Stop reason: HOSPADM

## 2025-01-21 RX ORDER — METOPROLOL TARTRATE 25 MG/1
37.5 TABLET, FILM COATED ORAL 2 TIMES DAILY
Status: CANCELLED | OUTPATIENT
Start: 2025-01-21

## 2025-01-21 RX ORDER — ALBUTEROL SULFATE 0.83 MG/ML
2.5 SOLUTION RESPIRATORY (INHALATION) EVERY 4 HOURS PRN
Status: CANCELLED | OUTPATIENT
Start: 2025-01-21

## 2025-01-21 RX ORDER — OXYCODONE HYDROCHLORIDE 5 MG/1
10 TABLET ORAL EVERY 4 HOURS PRN
Status: DISCONTINUED | OUTPATIENT
Start: 2025-01-21 | End: 2025-01-29 | Stop reason: HOSPADM

## 2025-01-21 RX ORDER — DEXTROSE MONOHYDRATE 100 MG/ML
INJECTION, SOLUTION INTRAVENOUS CONTINUOUS PRN
Status: CANCELLED | OUTPATIENT
Start: 2025-01-21

## 2025-01-21 RX ORDER — HEPARIN SODIUM 5000 [USP'U]/ML
5000 INJECTION, SOLUTION INTRAVENOUS; SUBCUTANEOUS EVERY 8 HOURS SCHEDULED
Status: DISCONTINUED | OUTPATIENT
Start: 2025-01-21 | End: 2025-01-29 | Stop reason: HOSPADM

## 2025-01-21 RX ORDER — GLUCAGON 1 MG/ML
1 KIT INJECTION PRN
Status: CANCELLED | OUTPATIENT
Start: 2025-01-21

## 2025-01-21 RX ORDER — ACETYLCYSTEINE 200 MG/ML
600 SOLUTION ORAL; RESPIRATORY (INHALATION)
Status: CANCELLED | OUTPATIENT
Start: 2025-01-21

## 2025-01-21 RX ORDER — ACETAMINOPHEN 500 MG
1000 TABLET ORAL EVERY 8 HOURS SCHEDULED
Status: DISCONTINUED | OUTPATIENT
Start: 2025-01-22 | End: 2025-01-26

## 2025-01-21 RX ORDER — ALBUTEROL SULFATE 0.83 MG/ML
2.5 SOLUTION RESPIRATORY (INHALATION) 2 TIMES DAILY
Status: DISCONTINUED | OUTPATIENT
Start: 2025-01-21 | End: 2025-01-21 | Stop reason: HOSPADM

## 2025-01-21 RX ORDER — FUROSEMIDE 20 MG/1
20 TABLET ORAL DAILY
Status: CANCELLED | OUTPATIENT
Start: 2025-01-22 | End: 2025-01-26

## 2025-01-21 RX ORDER — FERROUS SULFATE 325(65) MG
325 TABLET ORAL 2 TIMES DAILY WITH MEALS
Status: CANCELLED | OUTPATIENT
Start: 2025-01-21

## 2025-01-21 RX ORDER — METHOCARBAMOL 750 MG/1
750 TABLET, FILM COATED ORAL 4 TIMES DAILY
Status: CANCELLED | OUTPATIENT
Start: 2025-01-21

## 2025-01-21 RX ORDER — FLUTICASONE PROPIONATE 50 MCG
2 SPRAY, SUSPENSION (ML) NASAL DAILY PRN
Status: ON HOLD | DISCHARGE
Start: 2025-01-21

## 2025-01-21 RX ORDER — FAMOTIDINE 20 MG/1
20 TABLET, FILM COATED ORAL DAILY
Status: DISCONTINUED | OUTPATIENT
Start: 2025-01-22 | End: 2025-01-29 | Stop reason: HOSPADM

## 2025-01-21 RX ORDER — BISACODYL 10 MG
10 SUPPOSITORY, RECTAL RECTAL DAILY PRN
Status: CANCELLED | OUTPATIENT
Start: 2025-01-21

## 2025-01-21 RX ORDER — PREGABALIN 25 MG/1
50 CAPSULE ORAL 2 TIMES DAILY
Status: CANCELLED | OUTPATIENT
Start: 2025-01-21

## 2025-01-21 RX ORDER — HYDROXYZINE HYDROCHLORIDE 10 MG/1
10 TABLET, FILM COATED ORAL 3 TIMES DAILY PRN
Status: ON HOLD | DISCHARGE
Start: 2025-01-21 | End: 2025-01-31

## 2025-01-21 RX ORDER — FERROUS SULFATE 325(65) MG
325 TABLET ORAL 2 TIMES DAILY WITH MEALS
Status: ON HOLD | DISCHARGE
Start: 2025-01-21

## 2025-01-21 RX ORDER — HYDRALAZINE HYDROCHLORIDE 10 MG/1
10 TABLET, FILM COATED ORAL EVERY 6 HOURS PRN
Status: DISCONTINUED | OUTPATIENT
Start: 2025-01-21 | End: 2025-01-29 | Stop reason: HOSPADM

## 2025-01-21 RX ORDER — ONDANSETRON 2 MG/ML
4 INJECTION INTRAMUSCULAR; INTRAVENOUS EVERY 6 HOURS PRN
Status: CANCELLED | OUTPATIENT
Start: 2025-01-21

## 2025-01-21 RX ORDER — ASPIRIN 81 MG/1
81 TABLET, CHEWABLE ORAL DAILY
Status: DISCONTINUED | OUTPATIENT
Start: 2025-01-22 | End: 2025-01-29 | Stop reason: HOSPADM

## 2025-01-21 RX ORDER — ONDANSETRON 4 MG/1
4 TABLET, ORALLY DISINTEGRATING ORAL EVERY 8 HOURS PRN
Status: CANCELLED | OUTPATIENT
Start: 2025-01-21

## 2025-01-21 RX ORDER — SODIUM CHLORIDE 0.9 % (FLUSH) 0.9 %
5-40 SYRINGE (ML) INJECTION PRN
Status: DISCONTINUED | OUTPATIENT
Start: 2025-01-21 | End: 2025-01-29 | Stop reason: HOSPADM

## 2025-01-21 RX ORDER — BISACODYL 10 MG
10 SUPPOSITORY, RECTAL RECTAL DAILY PRN
Status: DISCONTINUED | OUTPATIENT
Start: 2025-01-21 | End: 2025-01-29 | Stop reason: HOSPADM

## 2025-01-21 RX ORDER — ALBUTEROL SULFATE 0.83 MG/ML
2.5 SOLUTION RESPIRATORY (INHALATION) EVERY 4 HOURS PRN
Status: DISCONTINUED | OUTPATIENT
Start: 2025-01-21 | End: 2025-01-29 | Stop reason: HOSPADM

## 2025-01-21 RX ORDER — BISACODYL 5 MG/1
5 TABLET, DELAYED RELEASE ORAL DAILY
Status: ON HOLD | DISCHARGE
Start: 2025-01-22

## 2025-01-21 RX ORDER — SODIUM CHLORIDE 9 MG/ML
INJECTION, SOLUTION INTRAVENOUS PRN
Status: CANCELLED | OUTPATIENT
Start: 2025-01-21

## 2025-01-21 RX ORDER — FERROUS SULFATE 325(65) MG
325 TABLET ORAL 2 TIMES DAILY WITH MEALS
Status: DISCONTINUED | OUTPATIENT
Start: 2025-01-22 | End: 2025-01-29 | Stop reason: HOSPADM

## 2025-01-21 RX ORDER — OXYCODONE HYDROCHLORIDE 5 MG/1
5 TABLET ORAL EVERY 4 HOURS PRN
Status: DISCONTINUED | OUTPATIENT
Start: 2025-01-21 | End: 2025-01-29 | Stop reason: HOSPADM

## 2025-01-21 RX ORDER — POLYETHYLENE GLYCOL 3350 17 G/17G
17 POWDER, FOR SOLUTION ORAL DAILY
Status: CANCELLED | OUTPATIENT
Start: 2025-01-22

## 2025-01-21 RX ORDER — FAMOTIDINE 20 MG/1
20 TABLET, FILM COATED ORAL DAILY
Status: ON HOLD | DISCHARGE
Start: 2025-01-22

## 2025-01-21 RX ORDER — POLYETHYLENE GLYCOL 3350 17 G/17G
17 POWDER, FOR SOLUTION ORAL DAILY
Status: DISCONTINUED | OUTPATIENT
Start: 2025-01-22 | End: 2025-01-29 | Stop reason: HOSPADM

## 2025-01-21 RX ORDER — ASPIRIN 81 MG/1
81 TABLET, CHEWABLE ORAL DAILY
Status: CANCELLED | OUTPATIENT
Start: 2025-01-22

## 2025-01-21 RX ORDER — GLUCAGON 1 MG/ML
1 KIT INJECTION PRN
Status: DISCONTINUED | OUTPATIENT
Start: 2025-01-21 | End: 2025-01-29 | Stop reason: HOSPADM

## 2025-01-21 RX ORDER — OXYCODONE HYDROCHLORIDE 10 MG/1
10 TABLET ORAL EVERY 6 HOURS PRN
Refills: 0 | Status: CANCELLED | DISCHARGE
Start: 2025-01-21 | End: 2025-01-26

## 2025-01-21 RX ORDER — LANOLIN ALCOHOL/MO/W.PET/CERES
400 CREAM (GRAM) TOPICAL DAILY
Status: CANCELLED | OUTPATIENT
Start: 2025-01-22 | End: 2025-01-26

## 2025-01-21 RX ADMIN — ACETAMINOPHEN 1000 MG: 500 TABLET ORAL at 17:24

## 2025-01-21 RX ADMIN — METHOCARBAMOL 750 MG: 750 TABLET ORAL at 21:51

## 2025-01-21 RX ADMIN — POTASSIUM CHLORIDE 10 MEQ: 750 TABLET, EXTENDED RELEASE ORAL at 17:25

## 2025-01-21 RX ADMIN — ACETAMINOPHEN 1000 MG: 500 TABLET ORAL at 11:19

## 2025-01-21 RX ADMIN — ALBUTEROL SULFATE 2.5 MG: 2.5 SOLUTION RESPIRATORY (INHALATION) at 12:59

## 2025-01-21 RX ADMIN — OXYCODONE 10 MG: 5 TABLET ORAL at 05:04

## 2025-01-21 RX ADMIN — BISACODYL 5 MG: 5 TABLET, COATED ORAL at 07:43

## 2025-01-21 RX ADMIN — OXYCODONE HYDROCHLORIDE 10 MG: 5 TABLET ORAL at 22:01

## 2025-01-21 RX ADMIN — METOPROLOL TARTRATE 37.5 MG: 25 TABLET, FILM COATED ORAL at 21:51

## 2025-01-21 RX ADMIN — METHOCARBAMOL TABLETS 750 MG: 750 TABLET, COATED ORAL at 13:42

## 2025-01-21 RX ADMIN — METHOCARBAMOL TABLETS 750 MG: 750 TABLET, COATED ORAL at 07:42

## 2025-01-21 RX ADMIN — HEPARIN SODIUM 5000 UNITS: 5000 INJECTION INTRAVENOUS; SUBCUTANEOUS at 05:05

## 2025-01-21 RX ADMIN — CLOPIDOGREL BISULFATE 75 MG: 75 TABLET, FILM COATED ORAL at 07:43

## 2025-01-21 RX ADMIN — SODIUM CHLORIDE, PRESERVATIVE FREE 10 ML: 5 INJECTION INTRAVENOUS at 07:44

## 2025-01-21 RX ADMIN — HEPARIN SODIUM 5000 UNITS: 5000 INJECTION INTRAVENOUS; SUBCUTANEOUS at 00:01

## 2025-01-21 RX ADMIN — METOPROLOL TARTRATE 37.5 MG: 25 TABLET, FILM COATED ORAL at 07:42

## 2025-01-21 RX ADMIN — ATORVASTATIN CALCIUM 40 MG: 40 TABLET, FILM COATED ORAL at 21:51

## 2025-01-21 RX ADMIN — FAMOTIDINE 20 MG: 20 TABLET, FILM COATED ORAL at 07:42

## 2025-01-21 RX ADMIN — POTASSIUM CHLORIDE 10 MEQ: 750 TABLET, EXTENDED RELEASE ORAL at 07:43

## 2025-01-21 RX ADMIN — ACETAMINOPHEN 1000 MG: 500 TABLET ORAL at 00:01

## 2025-01-21 RX ADMIN — FERROUS SULFATE TAB 325 MG (65 MG ELEMENTAL FE) 325 MG: 325 (65 FE) TAB at 17:25

## 2025-01-21 RX ADMIN — PREGABALIN 50 MG: 25 CAPSULE ORAL at 21:51

## 2025-01-21 RX ADMIN — ASPIRIN 81 MG 81 MG: 81 TABLET ORAL at 07:43

## 2025-01-21 RX ADMIN — METHOCARBAMOL TABLETS 750 MG: 750 TABLET, COATED ORAL at 17:25

## 2025-01-21 RX ADMIN — ACETYLCYSTEINE 600 MG: 200 INHALANT RESPIRATORY (INHALATION) at 19:02

## 2025-01-21 RX ADMIN — CHLORHEXIDINE GLUCONATE 15 ML: 1.2 RINSE ORAL at 07:43

## 2025-01-21 RX ADMIN — Medication 400 MG: at 07:43

## 2025-01-21 RX ADMIN — POTASSIUM CHLORIDE 10 MEQ: 750 TABLET, EXTENDED RELEASE ORAL at 11:20

## 2025-01-21 RX ADMIN — HEPARIN SODIUM 5000 UNITS: 5000 INJECTION INTRAVENOUS; SUBCUTANEOUS at 14:22

## 2025-01-21 RX ADMIN — FERROUS SULFATE TAB 325 MG (65 MG ELEMENTAL FE) 325 MG: 325 (65 FE) TAB at 07:43

## 2025-01-21 RX ADMIN — PREGABALIN 50 MG: 25 CAPSULE ORAL at 11:19

## 2025-01-21 RX ADMIN — ALBUTEROL SULFATE 2.5 MG: 2.5 SOLUTION RESPIRATORY (INHALATION) at 19:01

## 2025-01-21 RX ADMIN — HEPARIN SODIUM 5000 UNITS: 5000 INJECTION INTRAVENOUS; SUBCUTANEOUS at 21:51

## 2025-01-21 ASSESSMENT — PAIN SCALES - GENERAL
PAINLEVEL_OUTOF10: 4
PAINLEVEL_OUTOF10: 6
PAINLEVEL_OUTOF10: 7
PAINLEVEL_OUTOF10: 2
PAINLEVEL_OUTOF10: 4
PAINLEVEL_OUTOF10: 7
PAINLEVEL_OUTOF10: 0
PAINLEVEL_OUTOF10: 0
PAINLEVEL_OUTOF10: 5

## 2025-01-21 ASSESSMENT — PAIN DESCRIPTION - ORIENTATION
ORIENTATION: MID
ORIENTATION: RIGHT;LEFT;POSTERIOR
ORIENTATION: MID

## 2025-01-21 ASSESSMENT — PAIN DESCRIPTION - ONSET: ONSET: ON-GOING

## 2025-01-21 ASSESSMENT — PAIN DESCRIPTION - DESCRIPTORS
DESCRIPTORS: ACHING
DESCRIPTORS: SORE

## 2025-01-21 ASSESSMENT — PAIN DESCRIPTION - LOCATION
LOCATION: SHOULDER;CHEST
LOCATION: CHEST
LOCATION: BACK
LOCATION: CHEST
LOCATION: SHOULDER

## 2025-01-21 ASSESSMENT — PAIN SCALES - WONG BAKER: WONGBAKER_NUMERICALRESPONSE: NO HURT

## 2025-01-21 NOTE — PROGRESS NOTES
01/14/25 1738   Patient Observation   Pulse 80   Respirations 27   SpO2 99 %   Vent Information   Vent Mode (S)  CPAP   Ventilator Settings   PEEP/CPAP (cmH2O) 5  (PS 10)   Vent Patient Data (Readings)   Vt (Measured) 639 mL   Peak Inspiratory Pressure (cmH2O) 16 cmH2O   Rate Measured 27 br/min   Minute Volume (L/min) 11.4 Liters   Mean Airway Pressure (cmH2O) 8.8 cmH20   I:E Ratio 1:.1.8   Flow Sensitivity 3 L/min       
   01/19/25 0738 01/19/25 0739   PEP Therapy Details   Number of Breaths 10 15   PEP Device Acapella EZPAP   Flow Rate  --  8 L/min   Pressure Level  --  10   Patient Observation   Patient Observations pt c/o shortness of breath this morning  --        
  [facility] Medicine Progress Note  V 1.6      Date of Admission: 1/11/2025    Hospital Day: 2      Chief Admission Complaint:  NSTEMI    Subjective:  denies chest and shoulder pain at time of exam.  Denies dyspnea, n/v.    Presenting Admission History: \"69 y.o. male with pmh of hypertension, hyperlipidemia, and COPD who presents with chief complaint of right shoulder pain that went into the right chest.  Patient otherwise states has been in his normal state of health.  Patient and his significant other family history is of heart disease and given the combination of shoulder and chest pain came in for evaluation.  The pain is in the right side and he does not currently have left-sided chest pain.  Denies any exertional component or diaphoresis.  EKG in the ER did show a new right bundle branch block and troponins were significant at 126 with a repeat that up trended to 133.  Patient also does have an SUNG with a creatinine of 1.7.  Patient does endorse not drinking a lot of water recently, but states that his appetite has been normal.  Patient otherwise has no acute complaints at this time.  Cardiology was consulted by the ER and patient was started on heparin infusion\"    Assessment/Plan:      Current Principal Problem:  NSTEMI (non-ST elevated myocardial infarction) (HCC)    NSTEMI.  HS trop appears to have peaked at 133 on 1/11.  It is currently 124.  EKG with no acute ischemic changes.  Continue heparin gtt.  Monitor on telemetry.  Check A1c, lipid panel.  Continue ASA, statin.  Echo ordered 1/11, not yet performed.  Cardiology has been consulted.      Acute kidney injury.  Likely due to poor intake at home, pt reports does not drink much.  Baseline creat likely normal, on admission it was 1.7.  It has improved with IVF to 1.4.  Will continue gentle IVF.  Avoid hypotension, nephrotoxics as able.  Monitor daily renal panel.      R shoulder pain.  Etiologies include tendinitis, MSK, arthritis.  Discussed outpatient 
 Chest tubes meet criteria to remove per open heart protocol.   No  air leak.  no crepitus.  Pt instructed on procedure.  Pt premedicated with morphine 4 mg.  Site cleansed and prepped per protocol.  Chest tubes X 3 removed without difficulty.  Dry sterile dressing applied.  Bilateral breath sounds audible.  O2 Sats 94 on 3L nasal cannula.       Incision site within normal limits. Patient tolerated well.     
Bedside PFT placed in patient chart.   
CVTS Cardiothoracic Progress Note:                              CC: POD # 2     1/14 Surgery with Dr. Gilbert:  s/p CORONARY ARTERY BYPASS GRAFT TIMES FOUR, CARDIOPULOMARY BYPASS, ENDOSCOPIC SAPHENOUS VEIN GRAFT HARVEST, POSTERIOR PERICARDIOTOMY, ULTRASOUND INTERROGATION OF GRAFTS, LEFT ATRIAL APPENDAGE CLIP PLACEMENT, TRANSESOPHAGEAL ECHOCARDIOGRAM, BILATERAL PECTORALIS BLOCKS (Chest)    Subjective:     Doing well.     Dietary Intake: ADAT  Nausea: Denies  Pain Control: Adequate.  Complaints: Post op pain/discomfort  Bowels: No BM. + flatus    HOSPITAL COURSE:    1/14 DOS: Out of OR 1500. Pre-op EF 55-60%, post op EF conserved at 55%. Atrial paced at 80 bpm, underlying rhythm is sinus radha 50-60 bpm. Given 3 bottle Albumin, 1 amp bicarb. BG 75 once in ICU, insulin gtt not turned on until night shift. On Levo 12 mcg & Nitro 5 mcg. Successfully extubated to nasal cannula.    1/15 POD #1: No acute events noted overnight. Given 1 L albumin. Levo gtt decreased but kept on for low MAP & DBP. Nitro gtt turned off at AM shift change. AV paced this AM at 80 bpm. Underlying rhythm sinus radha 50-60 bpm. Plan to attempt weaning of Levo today, all lines/drains to remain in. Plan to add isiah/robaxin for better control and home flonase.    1/16 POD # 2- Doing well. No acute event overnight. OOB to chair this AM. Needs encouragement for IS. Remains on low dose Levo. SBP low-mid 100s. SR 80s w/ PVCs. Elyte replaced. Plan to remove TPW, MS and RP chest tubes.     1/17 POD #3- Doing well. No acute event overnight. OOB to chair this AM. Weaned off Levo since yesterday. SR 80-90s.     1/18 POD # 4- Doing well. HR 90-low 100s. Increased BB to 25 mg BID for tighter HR control. Remove remaining LP chest tube today.     Past Medical History:   Diagnosis Date    Aphasia 12/26/2017    Asthma     Cerebrovascular disease     COPD (chronic obstructive pulmonary disease) (HCC)     Hepatitis C     History of hepatitis C 12/26/2017    Treated with 
CVTS Cardiothoracic Progress Note:                              CC: POD # 2     1/14 Surgery with Dr. Gilbert:  s/p CORONARY ARTERY BYPASS GRAFT TIMES FOUR, CARDIOPULOMARY BYPASS, ENDOSCOPIC SAPHENOUS VEIN GRAFT HARVEST, POSTERIOR PERICARDIOTOMY, ULTRASOUND INTERROGATION OF GRAFTS, LEFT ATRIAL APPENDAGE CLIP PLACEMENT, TRANSESOPHAGEAL ECHOCARDIOGRAM, BILATERAL PECTORALIS BLOCKS (Chest)    Subjective:     Doing well. Denies c/o .     Dietary Intake: ADAT  Nausea: Denies  Pain Control: Adequate. Reports improvement.   Complaints: Post op pain/discomfort  Bowels: No BM. + flatus    HOSPITAL COURSE:    1/14 DOS: Out of OR 1500. Pre-op EF 55-60%, post op EF conserved at 55%. Atrial paced at 80 bpm, underlying rhythm is sinus radha 50-60 bpm. Given 3 bottle Albumin, 1 amp bicarb. BG 75 once in ICU, insulin gtt not turned on until night shift. On Levo 12 mcg & Nitro 5 mcg. Successfully extubated to nasal cannula.    1/15 POD #1: No acute events noted overnight. Given 1 L albumin. Levo gtt decreased but kept on for low MAP & DBP. Nitro gtt turned off at AM shift change. AV paced this AM at 80 bpm. Underlying rhythm sinus radha 50-60 bpm. Plan to attempt weaning of Levo today, all lines/drains to remain in. Plan to add isiah/robaxin for better control and home flonase.    1/6 POD # 2- Doing well. No acute event overnight. OOB to chair this AM. Needs encouragement for IS. Remains on low dose Levo. SBP low-mid 100s. SR 80s w/ PVCs. Elyte replaced. Plan to remove TPW, MS and RP chest tubes.     Past Medical History:   Diagnosis Date    Aphasia 12/26/2017    Asthma     Cerebrovascular disease     COPD (chronic obstructive pulmonary disease) (HCC)     Hepatitis C     History of hepatitis C 12/26/2017    Treated with pills in the past    Hyperlipidemia     Hypertension     MI (myocardial infarction) (HCC)     Peripheral vascular disease (HCC)     left upper leg stent per pt    Stroke (HCC) 2019      Past Surgical History:   Procedure 
CVTS Cardiothoracic Progress Note:                              CC: POD # 2     1/14 Surgery with Dr. Gilbert:  s/p CORONARY ARTERY BYPASS GRAFT TIMES FOUR, CARDIOPULOMARY BYPASS, ENDOSCOPIC SAPHENOUS VEIN GRAFT HARVEST, POSTERIOR PERICARDIOTOMY, ULTRASOUND INTERROGATION OF GRAFTS, LEFT ATRIAL APPENDAGE CLIP PLACEMENT, TRANSESOPHAGEAL ECHOCARDIOGRAM, BILATERAL PECTORALIS BLOCKS (Chest)    Subjective:     Doing well. Reports slightly worsened pain.     Dietary Intake: ADAT  Nausea: Denies  Pain Control: Adequate.  Complaints: Post op pain/discomfort  Bowels: No BM. + flatus    HOSPITAL COURSE:    1/14 DOS: Out of OR 1500. Pre-op EF 55-60%, post op EF conserved at 55%. Atrial paced at 80 bpm, underlying rhythm is sinus radha 50-60 bpm. Given 3 bottle Albumin, 1 amp bicarb. BG 75 once in ICU, insulin gtt not turned on until night shift. On Levo 12 mcg & Nitro 5 mcg. Successfully extubated to nasal cannula.    1/15 POD #1: No acute events noted overnight. Given 1 L albumin. Levo gtt decreased but kept on for low MAP & DBP. Nitro gtt turned off at AM shift change. AV paced this AM at 80 bpm. Underlying rhythm sinus radha 50-60 bpm. Plan to attempt weaning of Levo today, all lines/drains to remain in. Plan to add isiah/robaxin for better control and home flonase.    1/16 POD # 2- Doing well. No acute event overnight. OOB to chair this AM. Needs encouragement for IS. Remains on low dose Levo. SBP low-mid 100s. SR 80s w/ PVCs. Elyte replaced. Plan to remove TPW, MS and RP chest tubes.     1/17 POD #3- Doing well. No acute event overnight. OOB to chair this AM. Weaned off Levo since yesterday. SR 80-90s.     Past Medical History:   Diagnosis Date    Aphasia 12/26/2017    Asthma     Cerebrovascular disease     COPD (chronic obstructive pulmonary disease) (HCC)     Hepatitis C     History of hepatitis C 12/26/2017    Treated with pills in the past    Hyperlipidemia     Hypertension     MI (myocardial infarction) (HCC)     
CVTS Cardiothoracic Progress Note:                              CC: POD # 5     1/14 Surgery with Dr. Gilbert:  s/p CORONARY ARTERY BYPASS GRAFT TIMES FOUR, CARDIOPULOMARY BYPASS, ENDOSCOPIC SAPHENOUS VEIN GRAFT HARVEST, POSTERIOR PERICARDIOTOMY, ULTRASOUND INTERROGATION OF GRAFTS, LEFT ATRIAL APPENDAGE CLIP PLACEMENT, TRANSESOPHAGEAL ECHOCARDIOGRAM, BILATERAL PECTORALIS BLOCKS (Chest)    Subjective:     Doing well. Sitting up. NAD    Dietary Intake: ADAT  Nausea: Denies  Pain Control: Adequate.  Complaints: Post op pain/discomfort  Bowels: No BM. + flatus    HOSPITAL COURSE:    1/14 DOS: Out of OR 1500. Pre-op EF 55-60%, post op EF conserved at 55%. Atrial paced at 80 bpm, underlying rhythm is sinus radha 50-60 bpm. Given 3 bottle Albumin, 1 amp bicarb. BG 75 once in ICU, insulin gtt not turned on until night shift. On Levo 12 mcg & Nitro 5 mcg. Successfully extubated to nasal cannula.    1/15 POD #1: No acute events noted overnight. Given 1 L albumin. Levo gtt decreased but kept on for low MAP & DBP. Nitro gtt turned off at AM shift change. AV paced this AM at 80 bpm. Underlying rhythm sinus radha 50-60 bpm. Plan to attempt weaning of Levo today, all lines/drains to remain in. Plan to add isiah/robaxin for better control and home flonase.    1/16 POD # 2- Doing well. No acute event overnight. OOB to chair this AM. Needs encouragement for IS. Remains on low dose Levo. SBP low-mid 100s. SR 80s w/ PVCs. Elyte replaced. Plan to remove TPW, MS and RP chest tubes.     1/17 POD #3- Doing well. No acute event overnight. OOB to chair this AM. Weaned off Levo since yesterday. SR 80-90s.     1/18 POD # 4- Doing well. HR 90-low 100s. Increased BB to 25 mg BID for tighter HR control. Remove remaining LP chest tube today.     1/19 POD # 5- Progressing well. Weaned to RA. HR 80- lows 100s with increased BB. Likely need to increase pending BP trend. Removing L pleural tube today. Output unchanged. CXR stable. Transition to PO lasix. 
CVTS Cardiothoracic Progress Note:                              CC: POD # 6     1/14 Surgery with Dr. Gilbert:  s/p CORONARY ARTERY BYPASS GRAFT TIMES FOUR, CARDIOPULOMARY BYPASS, ENDOSCOPIC SAPHENOUS VEIN GRAFT HARVEST, POSTERIOR PERICARDIOTOMY, ULTRASOUND INTERROGATION OF GRAFTS, LEFT ATRIAL APPENDAGE CLIP PLACEMENT, TRANSESOPHAGEAL ECHOCARDIOGRAM, BILATERAL PECTORALIS BLOCKS (Chest)    Subjective:     Doing well. Sitting up. NAD    Dietary Intake: ADAT  Nausea: Denies  Pain Control: Adequate.  Complaints: Post op pain/discomfort  Bowels: No BM. + flatus    HOSPITAL COURSE:    1/14 DOS: Out of OR 1500. Pre-op EF 55-60%, post op EF conserved at 55%. Atrial paced at 80 bpm, underlying rhythm is sinus radha 50-60 bpm. Given 3 bottle Albumin, 1 amp bicarb. BG 75 once in ICU, insulin gtt not turned on until night shift. On Levo 12 mcg & Nitro 5 mcg. Successfully extubated to nasal cannula.    1/15 POD #1: No acute events noted overnight. Given 1 L albumin. Levo gtt decreased but kept on for low MAP & DBP. Nitro gtt turned off at AM shift change. AV paced this AM at 80 bpm. Underlying rhythm sinus radha 50-60 bpm. Plan to attempt weaning of Levo today, all lines/drains to remain in. Plan to add isiah/robaxin for better control and home flonase.    1/16 POD # 2- Doing well. No acute event overnight. OOB to chair this AM. Needs encouragement for IS. Remains on low dose Levo. SBP low-mid 100s. SR 80s w/ PVCs. Elyte replaced. Plan to remove TPW, MS and RP chest tubes.     1/17 POD #3- Doing well. No acute event overnight. OOB to chair this AM. Weaned off Levo since yesterday. SR 80-90s.     1/18 POD # 4- Doing well. HR 90-low 100s. Increased BB to 25 mg BID for tighter HR control. Remove remaining LP chest tube today.     1/19 POD # 5- Progressing well. Weaned to RA. HR 80- lows 100s with increased BB. Likely need to increase pending BP trend. Removing L pleural tube today. Output unchanged. CXR stable. Transition to PO lasix. 
Cath planned for 1/23/25 per cards, pt npo AM.   
Comprehensive Nutrition Assessment    Type and Reason for Visit:  Initial, LOS    Nutrition Recommendations/Plan:   Continue regular diet.   Add Ensure Plus daily.  Encourage PO intakes.   Monitor po intakes, nutrition adequacy, weights, pertinent labs, BMs     Malnutrition Assessment:  Malnutrition Status:  At risk for malnutrition (01/17/25 1431)    Context:  Acute Illness         Nutrition Assessment:    LOS assessment. Pt with pmh of hypertension, hyperlipidemia, and COPD who presents with chief complaint of right shoulder pain that went into the right chest, found to have NSTEMI. S/p CABG on 1/14. Currently on a regular diet. PO intakes % per EMR. Pt endorses stable weight history. Pt reported appetite was good before admission and since admission his appetite has been fluctuating. Pt is willing to try Ensure. Pt was given nutrition education on heart healthy nutrition.    Nutrition Related Findings:    BM x1 on 1/13. Labs reviewed. -184 x 24 hr. Chest tubes removed 1/16. Wound Type: Surgical Incision       Current Nutrition Intake & Therapies:    Average Meal Intake: 26-50%, 51-75%, %  Average Supplements Intake: None Ordered  ADULT DIET; Regular  ADULT ORAL NUTRITION SUPPLEMENT; Breakfast; Standard High Calorie/High Protein Oral Supplement    Anthropometric Measures:  Height: 185.4 cm (6' 1\")  Ideal Body Weight (IBW): 184 lbs (84 kg)       Current Body Weight: 84.4 kg (186 lb 1.1 oz),   IBW. Weight Source: Standing scale  Current BMI (kg/m2): 24.6                             BMI Categories: Normal Weight (BMI 22.0 to 24.9) age over 65    Estimated Daily Nutrient Needs:  Energy Requirements Based On: Kcal/kg  Weight Used for Energy Requirements: Admission  Energy (kcal/day): 2100 - 2520  Weight Used for Protein Requirements: Admission  Protein (g/day): 101 - 126  Method Used for Fluid Requirements: 1 ml/kcal  Fluid (ml/day): 2100 - 2520    Nutrition Diagnosis:   Inadequate oral intake related 
L side pleural chest tube- ANUJA drain removed. Pt tolerated well, site clean, no redness or swelling. Covered with petroleum gauze and sterile dressing.   
Occupational Therapy  Facility/Department: [facility] C2 CARD TELEMETRY  Daily Treatment Note  NAME: Dawood Epps  : 1955  MRN: [id number]    Date of Service: 2025    Discharge Recommendations:  IP Rehab, Patient able to tolerate 3 hours of therapy per day  OT Equipment Recommendations  Equipment Needed: No    Therapy discharge recommendations are subject to collaboration from the patient’s interdisciplinary healthcare team, including MD and case management recommendations.    Barriers to Home Discharge:   [] Steps to access home entry or bed/bath:   [x] Unable to transfer, ambulate, or propel wheelchair household distances without assist   [x] Limited available assist at home upon discharge    [] Patient or family requests d/c to post-acute facility    [x] Poor cognition/safety awareness for d/c to home alone    [] Unable to maintain ordered weight bearing status    [] Patient with salient signs of long-standing immobility   [x] Decreased independence with ADLs   [x] Increased risk for falls   [] Other:    If pt is unable to be seen after this session, please let this note serve as discharge summary.  Please see case management note for discharge disposition.  Thank you.    Patient Diagnosis(es): The primary encounter diagnosis was NSTEMI (non-ST elevated myocardial infarction) ([facility]). Diagnoses of SUNG (acute kidney injury) ([facility]), Chest pain, and Coronary artery disease involving native coronary artery of native heart with unstable angina pectoris ([facility]) were also pertinent to this visit.     Assessment   Assessment: Pt agreeable to OT session and tolerated fairly, is limited by endurance and shortness of breath. Pt requires modA for bed mobility, CGA for functional transfers, and CGA for functional mobility with no AD. They completed grooming tasks in stance at sink with SPV (unable to stand at sink d/t shortness of breath). He requires min verbal cues for adherence to sternal px. Pt is functioning below their 
Occupational Therapy  Facility/Department: [facility] C2 CARD TELEMETRY  Daily Treatment Note  NAME: Dawood Epps  : 1955  MRN: [id number]    Date of Service: 2025    Discharge Recommendations:  IP Rehab, Patient able to tolerate 3 hours of therapy per day  OT Equipment Recommendations  Equipment Needed: No  Other: Defer    Therapy discharge recommendations are subject to collaboration from the patient’s interdisciplinary healthcare team, including MD and case management recommendations.    Barriers to Home Discharge:   [] Steps to access home entry or bed/bath:   [x] Unable to transfer, ambulate, or propel wheelchair household distances without assist   [x] Limited available assist at home upon discharge    [x] Patient or family requests d/c to post-acute facility    [x] Poor cognition/safety awareness for d/c to home alone    [] Unable to maintain ordered weight bearing status    [] Patient with salient signs of long-standing immobility   [x] Decreased independence with ADLs   [x] Increased risk for falls   [] Other:    If pt is unable to be seen after this session, please let this note serve as discharge summary.  Please see case management note for discharge disposition.  Thank you.    Patient Diagnosis(es): The primary encounter diagnosis was NSTEMI (non-ST elevated myocardial infarction) ([facility]). Diagnoses of SUNG (acute kidney injury) ([facility]), Chest pain, and Coronary artery disease involving native coronary artery of native heart with unstable angina pectoris ([facility]) were also pertinent to this visit.     Assessment   Assessment: Pt tolerated session well, performing bed mobility w/ min-mod A and functional/ADL transfers w/ CGA. He ambulated short household distance to/from bathroom w/ CGA and no AD. Pt tolerated x20 BUE AROM ex w/ good display of strength. Pt verbalized concern about HR, closely monitored throughout session, slightly elevated to 110's w/ highest at 118 bpm, pt w/ no SOB but did report 
Occupational Therapy  Facility/Department: [facility] C2 CARD TELEMETRY  Daily Treatment Note  NAME: Dawood Epps  : 1955  MRN: [id number]    Date of Service: 2025    Discharge Recommendations:  IP Rehab, Patient able to tolerate 3 hours of therapy per day  OT Equipment Recommendations  Equipment Needed: No    Therapy discharge recommendations are subject to collaboration from the patient’s interdisciplinary healthcare team, including MD and case management recommendations.     Barriers to Home Discharge:   [] Steps to access home entry or bed/bath:   [x] Unable to transfer, ambulate, or propel wheelchair household distances without assist   [x] Limited available assist at home upon discharge    [] Patient or family requests d/c to post-acute facility    [x] Poor cognition/safety awareness for d/c to home alone    [] Unable to maintain ordered weight bearing status    [] Patient with salient signs of long-standing immobility   [x] Decreased independence with ADLs   [x] Increased risk for falls   [] Other:     If pt is unable to be seen after this session, please let this note serve as discharge summary.  Please see case management note for discharge disposition.  Thank you.       Patient Diagnosis(es): The primary encounter diagnosis was NSTEMI (non-ST elevated myocardial infarction) ([facility]). Diagnoses of SUNG (acute kidney injury) ([facility]), Chest pain, and Coronary artery disease involving native coronary artery of native heart with unstable angina pectoris ([facility]) were also pertinent to this visit.     Assessment   Assessment: Patient seen in room for OT treatment session this date and agreeable to therapy. Patient tolerated therapy session fairly well however limited by SOB and HR elevation with activity requiring frequent rest breaks. Patient completed toileting, ambulation and transfers with CGA-Minimal assistance this date. Patient with extensive education provided on PLB, sternal precautions and adherence in 
Occupational Therapy  Facility/Department: [facility] C2 CARD TELEMETRY  Daily Treatment Note  NAME: Dawood Epps  : 1955  MRN: [id number]    Date of Service: 2025    Discharge Recommendations:  IP Rehab, Patient able to tolerate 3 hours of therapy per day  OT Equipment Recommendations  Equipment Needed: No  Other: Defer      Patient Diagnosis(es): The primary encounter diagnosis was NSTEMI (non-ST elevated myocardial infarction) ([facility]). Diagnoses of SUNG (acute kidney injury) ([facility]), Chest pain, and Coronary artery disease involving native coronary artery of native heart with unstable angina pectoris ([facility]) were also pertinent to this visit.     Therapy discharge recommendations are subject to collaboration from the patient’s interdisciplinary healthcare team, including MD and case management recommendations.    Barriers to Home Discharge:   [] Steps to access home entry or bed/bath:   [x] Unable to transfer, ambulate, or propel wheelchair household distances without assist   [x] Limited available assist at home upon discharge    [x] Patient or family requests d/c to post-acute facility    [x] Poor cognition/safety awareness for d/c to home alone    [] Unable to maintain ordered weight bearing status    [] Patient with salient signs of long-standing immobility   [x] Decreased independence with ADLs   [x] Increased risk for falls   [] Other:    If pt is unable to be seen after this session, please let this note serve as discharge summary.  Please see case management note for discharge disposition.  Thank you.     Assessment   Assessment: Pt tolerated OT session well. Pt able to complete stand step transfer with SBA. Pt also able to complete grooming and bathing ADLs while seated with SUPV while adhering to sternal precautions. Pt mod A for managing footwear. Pt able to verbalize sternal precautions independently. Pt would benefit from additional OT to improve overall activity tolerance and increase functional 
Occupational Therapy  Facility/Department: [facility] C2 CARD TELEMETRY  Daily Treatment Note  NAME: Dawood Epps  : 1955  MRN: [id number]    Date of Service: 2025    Discharge Recommendations:  IP Rehab, Patient able to tolerate 3 hours of therapy per day Vs home 24hrSPV and HHOT  OT Equipment Recommendations  Equipment Needed: No    Therapy discharge recommendations are subject to collaboration from the patient’s interdisciplinary healthcare team, including MD and case management recommendations.    Barriers to Home Discharge:   [] Steps to access home entry or bed/bath:   [x] Unable to transfer, ambulate, or propel wheelchair household distances without assist   [x] Limited available assist at home upon discharge    [] Patient or family requests d/c to post-acute facility    [x] Poor cognition/safety awareness for d/c to home alone    [] Unable to maintain ordered weight bearing status    [] Patient with salient signs of long-standing immobility   [x] Decreased independence with ADLs   [x] Increased risk for falls   [] Other:    If pt is unable to be seen after this session, please let this note serve as discharge summary.  Please see case management note for discharge disposition.  Thank you.    Patient Diagnosis(es): The primary encounter diagnosis was NSTEMI (non-ST elevated myocardial infarction) ([facility]). Diagnoses of SUNG (acute kidney injury) ([facility]), Chest pain, and Coronary artery disease involving native coronary artery of native heart with unstable angina pectoris ([facility]) were also pertinent to this visit.     Assessment   Assessment: Pt agreeable to OT session and tolerated well. Pt requires modAx2 for bed mobility, Pal progressing to CGA for functional transfers, and CGA for functional mobility with no AD. Increased tme needed for line mgmt this date and pt declined additional ADL needs. He was educated on safety with sternal px and functional transfers/mobility. Pt is functioning below their baseline 
Occupational Therapy  Facility/Department: [facility] C2 CARD TELEMETRY  Occupational Therapy Initial Assessment and Treatment Note    Name: Dawood Epps  : 1955  MRN: [id number]  Date of Service: 1/15/2025    Discharge Recommendations:  IP Rehab, Patient able to tolerate 3 hours of therapy per day  OT Equipment Recommendations  Equipment Needed: No     Therapy discharge recommendations are subject to collaboration from the patient’s interdisciplinary healthcare team, including MD and case management recommendations.    Barriers to Home Discharge:   [x] Steps to access home entry or bed/bath:   [x] Unable to transfer, ambulate, or propel wheelchair household distances without assist   [] Limited available assist at home upon discharge    [] Patient or family requests d/c to post-acute facility    [x] Poor cognition/safety awareness for d/c to home alone    [] Unable to maintain ordered weight bearing status    [] Patient with salient signs of long-standing immobility   [x] Decreased independence with ADLs   [x] Increased risk for falls   [] Other:    If pt is unable to be seen after this session, please let this note serve as discharge summary.  Please see case management note for discharge disposition.  Thank you.    Patient Diagnosis(es): The primary encounter diagnosis was NSTEMI (non-ST elevated myocardial infarction) ([facility]). Diagnoses of SUNG (acute kidney injury) (HCC), Chest pain, and Coronary artery disease involving native coronary artery of native heart with unstable angina pectoris (HCC) were also pertinent to this visit.  Past Medical History:  has a past medical history of Aphasia, Asthma, Cerebrovascular disease, COPD (chronic obstructive pulmonary disease) (HCC), Hepatitis C, History of hepatitis C, Hyperlipidemia, Hypertension, MI (myocardial infarction) (HCC), Peripheral vascular disease (HCC), and Stroke (HCC).  Past Surgical History:  has a past surgical history that includes hernia repair 
Patient admitted to CVU from [facility] with RN and anesthesia attached to monitors and ventilator.  Report received from anesthesiologist, Dr. Gomez.  Portable chest x-ray taken and reviewed and line and tube placement verified by Dr. Gomez.  Labs drawn, sent to lab and test completed and reviewed on [facility].  Assessment complete.  Continue monitoring hemodynamics per protocol.       RECOVERY PERIOD BEGINS  1500   
Patient following commands. Patient performed a nif of -22. Patient extubated to a 4 liter nasal cannula.   
Patient: Dawood Epps  [id number]  Date: 1/20/2025      Chief Complaint: chest pain    History of Present Illness/Hospital Course:  Dawood Epps is a 69 year old male with a past medical history significant for AAA s/p EVAR (2020), PAD s/p thrombolysis, L iliac artery stenting, carotid artery stenosis s/p bilateral CEA, prior MI, history of hepatitis C, COPD, HTN, and HLD who presented to [facility] on 1/11/25 with right chest and shoulder pain. EKG was negative for ischemic changes, but troponin was elevated. He was admitted with NSTEMI and SUNG. Cardiology was consulted and on 1/13 he underwent LHC which revealed critical disease of distal left main with 99% stenosis. CT Surgery was consulted and on 1/16/25 he underwent CABG x4. Post operatively he was hypotensive requiring levo, but was able to be weaned off on 1/16. Course notable for acute blood loss anemia. He continues to have functional deficits below his baseline. He endorses being independent at baseline. He works full time as a . He lives with his wife in a trilevel house with 2 [facility]. He is highly motivated to work with therapies and is interested in ARU.     Interval History:  No acute events overnight. Today Dawood is seen with his wife present. He reports feeling short of breath. He attributes this to being off of his home inhalers. Discussed that he is approved by insurance for ARU and we will follow for when he is medically ready to admit.      has a past medical history of Aphasia, Asthma, Cerebrovascular disease, COPD (chronic obstructive pulmonary disease) (HCC), Hepatitis C, History of hepatitis C, Hyperlipidemia, Hypertension, MI (myocardial infarction) (HCC), Peripheral vascular disease (HCC), and Stroke (HCC).     has a past surgical history that includes hernia repair (Bilateral, 2017); Carotid artery surgery (Bilateral, 2018); Abdominal aortic aneurysm repair (2020); Tonsillectomy and adenoidectomy; XR Cardiac Cath Procedure 
Patient: Dawood Epps  [id number]  Date: 1/21/2025      Chief Complaint: chest pain    History of Present Illness/Hospital Course:  Dawood Epps is a 69 year old male with a past medical history significant for AAA s/p EVAR (2020), PAD s/p thrombolysis, L iliac artery stenting, carotid artery stenosis s/p bilateral CEA, prior MI, history of hepatitis C, COPD, HTN, and HLD who presented to [facility] on 1/11/25 with right chest and shoulder pain. EKG was negative for ischemic changes, but troponin was elevated. He was admitted with NSTEMI and SUNG. Cardiology was consulted and on 1/13 he underwent LHC which revealed critical disease of distal left main with 99% stenosis. CT Surgery was consulted and on 1/16/25 he underwent CABG x4. Post operatively he was hypotensive requiring levo, but was able to be weaned off on 1/16. Course notable for acute blood loss anemia. He continues to have functional deficits below his baseline. He endorses being independent at baseline. He works full time as a . He lives with his wife in a trilevel house with 2 [facility]. He is highly motivated to work with therapies and is interested in ARU.     Interval History:  No acute events overnight. Today Dawood is seen with his wife and nursing present. He reports feeling improved. He is less short of breath. Discussed plan to admit to rehab today and he is agreeable.      has a past medical history of Aphasia, Asthma, Cerebrovascular disease, COPD (chronic obstructive pulmonary disease) (HCC), Hepatitis C, History of hepatitis C, Hyperlipidemia, Hypertension, MI (myocardial infarction) (HCC), Peripheral vascular disease (HCC), and Stroke (HCC).     has a past surgical history that includes hernia repair (Bilateral, 2017); Carotid artery surgery (Bilateral, 2018); Abdominal aortic aneurysm repair (2020); Tonsillectomy and adenoidectomy; XR Cardiac Cath Procedure (1/13/2025); Cardiac procedure (N/A, 1/13/2025); and Coronary artery bypass graft 
Physical Therapy  Facility/Department: [facility] C2 CARD TELEMETRY  Daily Treatment Note  NAME: Dawood Epps  : 1955  MRN: [id number]    Date of Service: 2025    Discharge Recommendations:  IP Rehab, Patient able to tolerate 3 hours of therapy per day   PT Equipment Recommendations  Equipment Needed: No  Other: defer    Therapy discharge recommendations take into account each patient's current medical complexities and are subject to input/oversight from the patient's healthcare team.   Barriers to Home Discharge:   [x] Steps to access home entry or bed/bath:   [x] Unable to transfer, ambulate, or propel wheelchair household distances without assist   [] Limited available assist at home upon discharge    [] Patient or family requests d/c to post-acute facility    [x] Poor cognition/safety awareness for d/c to home alone    []Unable to maintain ordered weight bearing status    [] Patient with salient signs of long-standing immobility   [x] Patient is at risk for falls    [] Other:    If pt is unable to be seen after this session, please let this note serve as discharge summary.  Please see case management note for discharge disposition.  Thank you.      Patient Diagnosis(es): The primary encounter diagnosis was NSTEMI (non-ST elevated myocardial infarction) ([facility]). Diagnoses of SUNG (acute kidney injury) ([facility]), Chest pain, and Coronary artery disease involving native coronary artery of native heart with unstable angina pectoris ([facility]) were also pertinent to this visit.    Assessment  Assessment: Pt presents to A s/p CABG. Pt presents below baseline for PT treatment with decreased strength, decreased endurance, decreased mobility, decreased balance, and decreased safety awareness. Pt tolerated session fair. Pt able to recall 3/3 sternal precautions upon arrival. Pt sleepy throughout session. Fatigue limits mobility however pt was able to progress since last session. Pt CGA x2 for gait with no AD 8 ft. Pt would 
Physical Therapy  Facility/Department: [facility] C2 CARD TELEMETRY  Physical Therapy Initial Assessment/treatment    Name: Dawood Epps  : 1955  MRN: [id number]  Date of Service: 1/15/2025    Discharge Recommendations:  IP Rehab, Patient able to tolerate 3 hours of therapy per day   PT Equipment Recommendations  Equipment Needed: No  Other: defer    Therapy discharge recommendations take into account each patient's current medical complexities and are subject to input/oversight from the patient's healthcare team.   Barriers to Home Discharge:   [x] Steps to access home entry or bed/bath:   [x] Unable to transfer, ambulate, or propel wheelchair household distances without assist   [x] Limited available assist at home upon discharge    [] Patient or family requests d/c to post-acute facility    [x] Poor cognition/safety awareness for d/c to home alone    []Unable to maintain ordered weight bearing status    [] Patient with salient signs of long-standing immobility   [x] Patient is at risk for falls    [] Other:    If pt is unable to be seen after this session, please let this note serve as discharge summary.  Please see case management note for discharge disposition.  Thank you.    Patient Diagnosis(es): The primary encounter diagnosis was NSTEMI (non-ST elevated myocardial infarction) (HCC). Diagnoses of SUNG (acute kidney injury) (HCC), Chest pain, and Coronary artery disease involving native coronary artery of native heart with unstable angina pectoris (HCC) were also pertinent to this visit.  Past Medical History:  has a past medical history of Aphasia, Asthma, Cerebrovascular disease, COPD (chronic obstructive pulmonary disease) (HCC), Hepatitis C, History of hepatitis C, Hyperlipidemia, Hypertension, MI (myocardial infarction) (HCC), Peripheral vascular disease (HCC), and Stroke (HCC).  Past Surgical History:  has a past surgical history that includes hernia repair (Bilateral, 2017); Carotid artery surgery 
Physical Therapy  Facility/Department: [facility] C2 CARD TELEMETRY  Daily Treatment Note  NAME: Dawood Epps  : 1955  MRN: [id number]    Date of Service: 2025    Discharge Recommendations:  IP Rehab, Patient able to tolerate 3 hours of therapy per day   PT Equipment Recommendations  Equipment Needed: No    Therapy discharge recommendations take into account each patient's current medical complexities and are subject to input/oversight from the patient's healthcare team.   Barriers to Home Discharge:   [x] Steps to access home entry or bed/bath: 2 [facility]   [x] Unable to transfer, ambulate, or propel wheelchair household distances without assist   [] Limited available assist at home upon discharge    [x] Patient or family requests d/c to post-acute facility    [] Poor cognition/safety awareness for d/c to home alone    []Unable to maintain ordered weight bearing status    [] Patient with salient signs of long-standing immobility   [x] Patient is at risk for falls   [] Other:    If pt is unable to be seen after this session, please let this note serve as discharge summary.  Please see case management note for discharge disposition.  Thank you.    Patient Diagnosis(es): The primary encounter diagnosis was NSTEMI (non-ST elevated myocardial infarction) ([facility]). Diagnoses of SUNG (acute kidney injury) ([facility]), Chest pain, and Coronary artery disease involving native coronary artery of native heart with unstable angina pectoris ([facility]) were also pertinent to this visit.    Assessment  Assessment: Pt demonstrating good progress with mobility this date. Pt ambulating up to 100 ft without device with CGA. Pt does fatigue and requires rest breaks between activities. Recommend IP rehab for continued therapy at discharge.  Activity Tolerance: Patient tolerated treatment well  Equipment Needed: No    Plan  Physical Therapy Plan  General Plan: 5-7 times per week  Current Treatment Recommendations: Strengthening;ROM;Functional 
Pt notified that PT wouldn't be working with him today. This RN asked patient if he would like to walk in the rodrigues. Pt refused stating \"I just don't feel like it.\" Pt educated on importance of movement and ambulation as it pertains to his recovery.  
Report given to patients nurse. Pt transferred to room 223. CMU called and monitor is on and verified, will be returned afater transport completed to ICU.  
Shift: 7a-7p    Procedure: CABG X4, THEA CLIP    Admit from OR (time and date): 1/14/25    Transition (time and date):     Surgery, return to OR no     Nursing assessment at handoff  stable    Most recent vitals: /62   Pulse 86   Temp (!) 100.8 °F (38.2 °C) (Bladder)   Resp 30   Ht 1.854 m (6' 1\")   Wt 85.1 kg (187 lb 9.8 oz)   SpO2 94%   BMI 24.75 kg/m²      Increased O2 requirements: no O2 requirements: Oxygen Therapy  SpO2: 94 %  Pulse Oximetry Type: Continuous  Pulse via Oximetry: 59 beats per minute  Pulse Oximeter Device Mode: Continuous  Pulse Oximeter Device Location: Finger  O2 Device: Nasal cannula  Skin Assessment: Clean, dry, & intact  FiO2 : 50 % (initially 70)  O2 Flow Rate (L/min): 5 L/min  Vent Mode: (S) CPAP     Admission weight Weight - Scale: 82.5 kg (181 lb 12.8 oz)  Today's weight   Wt Readings from Last 1 Encounters:   01/16/25 85.1 kg (187 lb 9.8 oz)    01/15/25 85.1kg standing scale    EF: 55%    Drop in Urinary Output no     Rhythm Changes Paced UNDERLYING YONNY    Pacing Wires Removed:  [] Yes  [x] NO  [] Platelets < 50,000  [] Arrhythmia  [] Bradycardia [] Valve Replacement  [] Pacing for Cardiac Index  []Physician Order    Lines/Drains  LDA Insertion Date Discontinued Date Dressing Changes   Art line 1/14/25     Central Line 1/14/25     Wei 1/14/25     Chest Tube 1/14/25 2P, 2M     Wires  1/14/25     ETT 1/14/25     ANUJA Drain      VasCath      Impella        Interventions After Office Hours  Problem(Brief) Date Time Intervention Physician contacted                                               Drip rates at handoff:    sodium chloride      norepinephrine 5 mcg/min (01/15/25 2300)    niCARdipine      insulin 3.8 Units/hr (01/16/25 0438)    dextrose         Hospital Course:  POD# 0  Patient from OR at 1500.  3 bottles of albumin given, 1 amp bicarb.  Can arouse and follow commands.  Unable to stay awake.  Blood pressure labile (Levo 6-15)  PCT R: 55ml  PCT L: 110  MCT: 
Shift: 7a-7p    Procedure: CABG X4, THEA CLIP    Admit from OR (time and date): 1/14/25    Transition (time and date):     Surgery, return to OR no     Nursing assessment at handoff  stable    Most recent vitals: /73   Pulse 93   Temp 98.6 °F (37 °C) (Oral)   Resp 22   Ht 1.854 m (6' 1\")   Wt 79.2 kg (174 lb 9.7 oz)   SpO2 95%   BMI 23.04 kg/m²      Increased O2 requirements: no O2 requirements: Oxygen Therapy  SpO2: 95 %  Pulse Oximetry Type: Continuous  Pulse via Oximetry: 59 beats per minute  Pulse Oximeter Device Mode: Continuous  Pulse Oximeter Device Location: Finger  O2 Device: None (Room air)  Oximetry Probe Site Changed: Yes  Skin Assessment: Clean, dry, & intact  FiO2 : 50 % (initially 70)  O2 Flow Rate (L/min): 1 L/min  Blood Gas  Performed?: No  Vent Mode: (S) CPAP     Admission weight Weight - Scale: 82.5 kg (181 lb 12.8 oz)  Today's weight   Wt Readings from Last 1 Encounters:   01/20/25 79.2 kg (174 lb 9.7 oz)    01/15/25 85.1kg standing scale  01/17/25 84.4 kg standing scale  EF: 55%    Drop in Urinary Output no     Rhythm Changes Paced UNDERLYING YONNY    Pacing Wires Removed:  [x] Yes  [] NO  [] Platelets < 50,000  [] Arrhythmia  [] Bradycardia [] Valve Replacement  [] Pacing for Cardiac Index  []Physician Order    Lines/Drains  LDA Insertion Date Discontinued Date Dressing Changes   Art line 1/14/25 1/16    Central Line 1/14/25     Wei 1/14/25 1/16    Chest Tube 1/14/25 2P, 2M 1P, 2M 1/16---    Wires  1/14/25 1/16    ETT 1/14/25 1/14    ANUJA Drain      VasCath      Impella        Interventions After Office Hours  Problem(Brief) Date Time Intervention Physician contacted                                               Drip rates at handoff:    sodium chloride      dextrose         Hospital Course:  POD# 0  Patient from OR at 1500.  3 bottles of albumin given, 1 amp bicarb.  Can arouse and follow commands.  Unable to stay awake.  Blood pressure labile (Levo 6-15)  PCT R: 55ml  PCT L: 
Shift: 7a-7p    Procedure: CABG X4, THEA CLIP    Admit from OR (time and date): 1/14/25    Transition (time and date):     Surgery, return to OR no     Nursing assessment at handoff  stable    Most recent vitals: BP (!) 145/65   Pulse 72   Temp 99.3 °F (37.4 °C)   Resp 26   Ht 1.854 m (6' 1\")   Wt 85.4 kg (188 lb 4.4 oz)   SpO2 96%   BMI 24.84 kg/m²      Increased O2 requirements: no O2 requirements: Oxygen Therapy  SpO2: 96 %  Pulse Oximetry Type: Continuous  Pulse via Oximetry: 59 beats per minute  Pulse Oximeter Device Mode: Continuous  Pulse Oximeter Device Location: Finger  O2 Device: Nasal cannula  Skin Assessment: Clean, dry, & intact  FiO2 : 50 % (initially 70)  O2 Flow Rate (L/min): 4 L/min  Vent Mode: (S) CPAP     Admission weight Weight - Scale: 82.5 kg (181 lb 12.8 oz)  Today's weight   Wt Readings from Last 1 Encounters:   01/14/25 81.3 kg (179 lb 3.2 oz)    01/15/25 85.1kg standing scale    EF: 55%    Drop in Urinary Output no     Rhythm Changes Paced UNDERLYING YONNY    Pacing Wires Removed:  [] Yes  [x] NO  [] Platelets < 50,000  [] Arrhythmia  [] Bradycardia [] Valve Replacement  [] Pacing for Cardiac Index  []Physician Order    Lines/Drains  LDA Insertion Date Discontinued Date Dressing Changes   Art line 1/14/25     Central Line 1/14/25     Wei 1/14/25     Chest Tube 1/14/25 2P, 2M     Wires  1/14/25     ETT 1/14/25     ANUJA Drain      VasCath      Impella        Interventions After Office Hours  Problem(Brief) Date Time Intervention Physician contacted                                               Drip rates at handoff:    dextrose 5 % and 0.45 % NaCl 75 mL/hr at 01/15/25 0700    sodium chloride      norepinephrine 10 mcg/min (01/15/25 0700)    niCARdipine      insulin 3.2 Units/hr (01/15/25 0700)    dextrose      dexmedeTOMIDine Stopped (01/14/25 2015)       Hospital Course:  POD# 0  Patient from OR at 1500.  3 bottles of albumin given, 1 amp bicarb.  Can arouse and follow commands.  
Shift: 7a-7p    Procedure: CABG X4, THEA CLIP    Admit from OR (time and date): 1/14/25    Transition (time and date):     Surgery, return to OR no     Nursing assessment at handoff  stable    Most recent vitals: BP (!) 145/65   Pulse 80   Temp 97.8 °F (36.6 °C) (Bladder)   Resp 24   Ht 1.854 m (6' 1\")   Wt 81.3 kg (179 lb 3.2 oz)   SpO2 98%   BMI 23.64 kg/m²      Increased O2 requirements: no O2 requirements: Oxygen Therapy  SpO2: 98 %  Pulse Oximetry Type: Continuous  Pulse via Oximetry: 59 beats per minute  Pulse Oximeter Device Mode: Continuous  Pulse Oximeter Device Location: Finger  O2 Device: Ventilator  FiO2 : 50 % (initially 70)  Vent Mode: (S) CPAP     Admission weight Weight - Scale: 82.5 kg (181 lb 12.8 oz)  Today's weight   Wt Readings from Last 1 Encounters:   01/14/25 81.3 kg (179 lb 3.2 oz)         EF: 55%    Drop in Urinary Output no     Rhythm Changes Paced UNDERLYING YONNY    Pacing Wires Removed:  [] Yes  [x] NO  [] Platelets < 50,000  [] Arrhythmia  [] Bradycardia [] Valve Replacement  [] Pacing for Cardiac Index  []Physician Order    Lines/Drains  LDA Insertion Date Discontinued Date Dressing Changes   Art line 1/14/25     Central Line 1/14/25     Wei 1/14/25     Chest Tube 1/14/25 2P, 2M     Wires  1/14/25     ETT 1/14/25     ANUJA Drain      VasCath      Impella        Interventions After Office Hours  Problem(Brief) Date Time Intervention Physician contacted                                               Drip rates at handoff:    dextrose 5 % and 0.45 % NaCl      sodium chloride      norepinephrine 12 mcg/min (01/14/25 1507)    niCARdipine      insulin      dextrose      nitroGLYCERIN 5 mcg/min (01/14/25 1502)    dexmedeTOMIDine         Hospital Course:  POD# 0  Patient from OR at 1500.  3 bottles of albumin given, 1 amp bicarb.  Can arouse and follow commands.  Unable to stay awake.  Blood pressure labile (Levo 6-15)  PCT R: 55ml  PCT L: 110  MCT: 285  UOP: 935       Lab 
Shift: 7a-7p    Procedure: CABG X4, THEA CLIP    Admit from OR (time and date): 1/14/25    Transition (time and date):     Surgery, return to OR no     Nursing assessment at handoff  stable    Most recent vitals: BP (!) 85/48   Pulse 80   Temp 99.8 °F (37.7 °C) (Oral)   Resp 16   Ht 1.854 m (6' 1\")   Wt 85.1 kg (187 lb 9.8 oz)   SpO2 95%   BMI 24.75 kg/m²      Increased O2 requirements: no O2 requirements: Oxygen Therapy  SpO2: 95 %  Pulse Oximetry Type: Continuous  Pulse via Oximetry: 59 beats per minute  Pulse Oximeter Device Mode: Continuous  Pulse Oximeter Device Location: Finger  O2 Device: Nasal cannula  Skin Assessment: Clean, dry, & intact  FiO2 : 50 % (initially 70)  O2 Flow Rate (L/min): 3 L/min  Vent Mode: (S) CPAP     Admission weight Weight - Scale: 82.5 kg (181 lb 12.8 oz)  Today's weight   Wt Readings from Last 1 Encounters:   01/16/25 85.1 kg (187 lb 9.8 oz)    01/15/25 85.1kg standing scale    EF: 55%    Drop in Urinary Output no     Rhythm Changes Paced UNDERLYING YONNY    Pacing Wires Removed:  [x] Yes  [] NO  [] Platelets < 50,000  [] Arrhythmia  [] Bradycardia [] Valve Replacement  [] Pacing for Cardiac Index  []Physician Order    Lines/Drains  LDA Insertion Date Discontinued Date Dressing Changes   Art line 1/14/25 1/16    Central Line 1/14/25     Wei 1/14/25 1/16    Chest Tube 1/14/25 2P, 2M 1P, 2M 1/16---    Wires  1/14/25 1/16    ETT 1/14/25 1/14    ANUJA Drain      VasCath      Impella        Interventions After Office Hours  Problem(Brief) Date Time Intervention Physician contacted                                               Drip rates at handoff:    sodium chloride      norepinephrine Stopped (01/16/25 1355)    niCARdipine      dextrose         Hospital Course:  POD# 0  Patient from OR at 1500.  3 bottles of albumin given, 1 amp bicarb.  Can arouse and follow commands.  Unable to stay awake.  Blood pressure labile (Levo 6-15)  PCT R: 55ml  PCT L: 110  MCT: 285  UOP: 935    POD#1 
Shift: 7a-7p    Procedure: CABG X4, THEA CLIP    Admit from OR (time and date): 1/14/25    Transition (time and date):     Surgery, return to OR no     Nursing assessment at handoff  stable    Most recent vitals: BP 92/69   Pulse 83   Temp 99.4 °F (37.4 °C) (Oral)   Resp 15   Ht 1.854 m (6' 1\")   Wt 85.1 kg (187 lb 9.8 oz)   SpO2 99%   BMI 24.75 kg/m²      Increased O2 requirements: no O2 requirements: Oxygen Therapy  SpO2: 99 %  Pulse Oximetry Type: Continuous  Pulse via Oximetry: 59 beats per minute  Pulse Oximeter Device Mode: Continuous  Pulse Oximeter Device Location: Finger  O2 Device: Nasal cannula  Skin Assessment: Clean, dry, & intact  FiO2 : 50 % (initially 70)  O2 Flow Rate (L/min): 3 L/min  Vent Mode: (S) CPAP     Admission weight Weight - Scale: 82.5 kg (181 lb 12.8 oz)  Today's weight   Wt Readings from Last 1 Encounters:   01/16/25 85.1 kg (187 lb 9.8 oz)    01/15/25 85.1kg standing scale  01/17/25 84.4 kg standing scale  EF: 55%    Drop in Urinary Output no     Rhythm Changes Paced UNDERLYING YONNY    Pacing Wires Removed:  [x] Yes  [] NO  [] Platelets < 50,000  [] Arrhythmia  [] Bradycardia [] Valve Replacement  [] Pacing for Cardiac Index  []Physician Order    Lines/Drains  LDA Insertion Date Discontinued Date Dressing Changes   Art line 1/14/25 1/16    Central Line 1/14/25     Wei 1/14/25 1/16    Chest Tube 1/14/25 2P, 2M 1P, 2M 1/16---    Wires  1/14/25 1/16    ETT 1/14/25 1/14    ANUJA Drain      VasCath      Impella        Interventions After Office Hours  Problem(Brief) Date Time Intervention Physician contacted                                               Drip rates at handoff:    sodium chloride      norepinephrine Stopped (01/16/25 5755)    niCARdipine      dextrose         Hospital Course:  POD# 0  Patient from OR at 1500.  3 bottles of albumin given, 1 amp bicarb.  Can arouse and follow commands.  Unable to stay awake.  Blood pressure labile (Levo 6-15)  PCT R: 55ml  PCT L: 
Shift: 7a-7p    Procedure: CABG X4, THEA CLIP    Admit from OR (time and date): 1/14/25    Transition (time and date):     Surgery, return to OR no     Nursing assessment at handoff  stable    Most recent vitals: BP 96/60   Pulse 99   Temp 98.6 °F (37 °C) (Axillary)   Resp 16   Ht 1.854 m (6' 1\")   Wt 84.4 kg (186 lb 1.1 oz)   SpO2 96%   BMI 24.55 kg/m²      Increased O2 requirements: no O2 requirements: Oxygen Therapy  SpO2: 96 %  Pulse Oximetry Type: Continuous  Pulse via Oximetry: 59 beats per minute  Pulse Oximeter Device Mode: Continuous  Pulse Oximeter Device Location: Finger  O2 Device: None (Room air)  Skin Assessment: Clean, dry, & intact  FiO2 : 50 % (initially 70)  O2 Flow Rate (L/min): 2 L/min  Vent Mode: (S) CPAP     Admission weight Weight - Scale: 82.5 kg (181 lb 12.8 oz)  Today's weight   Wt Readings from Last 1 Encounters:   01/17/25 84.4 kg (186 lb 1.1 oz)    01/15/25 85.1kg standing scale  01/17/25 84.4 kg standing scale  EF: 55%    Drop in Urinary Output no     Rhythm Changes Paced UNDERLYING YONNY    Pacing Wires Removed:  [x] Yes  [] NO  [] Platelets < 50,000  [] Arrhythmia  [] Bradycardia [] Valve Replacement  [] Pacing for Cardiac Index  []Physician Order    Lines/Drains  LDA Insertion Date Discontinued Date Dressing Changes   Art line 1/14/25 1/16    Central Line 1/14/25     Wei 1/14/25 1/16    Chest Tube 1/14/25 2P, 2M 1P, 2M 1/16---    Wires  1/14/25 1/16    ETT 1/14/25 1/14    ANUJA Drain      VasCath      Impella        Interventions After Office Hours  Problem(Brief) Date Time Intervention Physician contacted                                               Drip rates at handoff:    sodium chloride      norepinephrine Stopped (01/16/25 1355)    niCARdipine      dextrose         Hospital Course:  POD# 0  Patient from OR at 1500.  3 bottles of albumin given, 1 amp bicarb.  Can arouse and follow commands.  Unable to stay awake.  Blood pressure labile (Levo 6-15)  PCT R: 55ml  PCT L: 
for O2 Device No   Treatment   $Treatment Type $Inhaled Therapy/Meds   Respiratory   Respiratory Interventions (S)  Suction - ETT  (oral airway inserted  - pt biting tube, hhn with aerogen)       
cues for some    Objective  Vitals  Pulse: 88  Heart Rate Source: Monitor  BP: 106/60  BP Location: Left upper arm  BP Method: Automatic  Patient Position: Semi fowlers  MAP (Calculated): 75  SpO2: 97 %  O2 Device: Nasal cannula (2 L)  Bed Mobility Training  Bed Mobility Training: Yes  Interventions: Safety awareness training;Verbal cues;Manual cues  Supine to Sit: Maximum assistance (to L side, assist with trunk)  Sit to Supine: Moderate assistance (BLEs)  Scooting: Maximum assistance (up in bed, pt uses legs to assist)  Balance  Sitting: Intact  Standing: Impaired  Standing - Static: Fair;Good (CGA for standing at toilet for urination (pt misses toilet repetatively, recomend sitting))  Standing - Dynamic: Fair  Transfer Training  Transfer Training: Yes  Interventions: Safety awareness training;Verbal cues  Sit to Stand: Contact-guard assistance;Stand-by assistance (no AD, progress to SBA, 4x during session)  Stand to Sit: Contact-guard assistance;Stand-by assistance  Gait  Gait Training: Yes  Overall Level of Assistance: Contact-guard assistance  Distance (ft): 80 Feet  Assistive Device: Gait belt;None  Interventions: Safety awareness training;Verbal cues;Weight shifting training/pressure relief           Safety Devices  Type of Devices: Left in bed;Call light within reach;Patient at risk for falls;Nurse notified;Gait belt;All fall risk precautions in place;Bed alarm in place  Restraints  Restraints Initially in Place: No      Goals  Short Term Goals  Time Frame for Short Term Goals: 1/22  Short Term Goal 1: pt will perform STS with LRAD and SBA  Short Term Goal 2: pt will transfer bed to chair with LRAD and SBA  Short Term Goal 3: pt will tolerate gait assessment if/when appropriate for goal to be set  Short Term Goal 4: pt will participate in BLE exercises x10 INDEP by 1/19  Short Term Goal 5: pt will navigate 4 steps with 1 HR and SBA  Patient Goals   Patient Goals : \"to go home\"    Education  Patient 
  Anticipated Discharge Day/Date: >3 days  Barriers to Discharge:   --------------------------------------------------    MDM (any 2 required for High level billing)    A. Problems (any 1)  [x] Acute/Chronic Illness/injury posing ongoing threat to life and/or bodily function without ongoing treatment: NSTEMI/MV CAD  [] Severe exacerbation of chronic illness    --------------------------------------------------  B. Risk of Treatment (any 1)    [x] Drugs/treatments that require intensive monitoring for toxicity    [] IV ABX (Vancomycin, Aminoglycosides, etc)     [] Post-Cath/Contrast study requiring serial monitoring    [] IV Narcotic analgesia    [] Aggressive IV diuresis    [] Hypertonic Saline    [] Critical electrolyte abnormalities requiring IV replacement    [] Insulin - Scheduled/SSI or Insulin gtt    [x] Anticoagulation (Heparin gtt or Coumadin - other anticoagulants in special circumstances)    [] Cardiac Medications (IV Amiodarone/Diltiazem, Tikosyn, etc)    [] Hemodialysis    [] Other -    [] Change in code status    [] Decision to escalate care    [x] Major surgery/procedure with associated risk factors: CABG 1/14  --------------------------------------------------  C. Data (any 2)    [x] Data Review (any 3)    [x] Consultant notes from yesterday/today    [x] All available current labs reviewed interpreted for clinical significance    [x] Appropriate follow-up labs were ordered  [] Collateral history obtained     [x] Independent Interpretation of tests (any 1)    [x] Telemetry (Rhythm Strip) personally reviewed and interpreted: SB  [] Imaging personally reviewed and interpreted     [x] Discussion (any 1)  [x] Multi-Disciplinary Rounds with Case Management  [] Discussed management of the case with           Labs:  Personally reviewed on 1/14/2025 and interpreted for clinical significance as documented above.     Recent Labs     01/12/25  0604 01/13/25  0636 01/14/25  0420   WBC 6.8 6.4 8.3   HGB 13.5 13.7 
Moving all extremities spontaneously.  Chest tubes/Drains: Chest tube #3 (mediastinal) with 625 cc serosanguinous drainage/24 hrs; Chest tube #2 (Right pleural) with 155 cc serosanguinous drainage/24 hrs; Chest tube #1 (Left pleural) with 370 cc serosanguinous drainage/24 hrs; tidaling and intermittent bubble noted in atrium chamber.    MEDICATIONS  CARDIAC:   ASA 81 QD   Atorvastatin 40 QHS   Plavix 75 QD   Metoprolol 12.5 BID   PRN IV Hydralazine 5 mg   PRN IV Metoprolol 2.5 mg  NON-CARDIAC:   Tylenol   Albuterol   Dulcolax tabs   Famotidine   IV Lasix 20 Q6H  HSQ Q8H  SSI  Mag Oxide  Glycolax  Potassium ER tabs  PRN MOM  PRN Morphine  PRN Oxy    Assessment:   Post-op: day 1.   Condition: In stable condition.     Plan:   1. Cardiovascular: POD #1 s/p CABG X 4. On ASA 81, statin, & Plavix. BB 12.5 mg BID held because patient requiring Levophed  - Required 8-10 mcg Levophed overnight. Nitro gtt turned off at shift change without difficulty. Will continue to attempt weaning off Levo.  - Goal MAP 65, -130 mm Hg. Noted to have MAP <65 and low DBP 40s-50s overnight. HR atrial paced at 80 bpm, AV paced in the morning.  - Hold BB while on Levo     2. Pulmonary:  - Continue to wean off nasal cannula as able.  - Continue aggressive pulmonary expansion maneuvers (IS, EZPAP, Acapella)    3. Neurology:  - Added on Robaxin and Gabapentin  - Sedation & analgesia PRN    4. :    - IV Lasix 20 Q6H X 8 doses. Up 2.9 kg from pre-op weight.  - Urethral Wei catheter in place.  - Cr 1.7. Will continue to monitor  - I/O to monitor UOP.     5. ABD/GI: last BM 1/12  - Continue to monitor for BM  - Scheduled dulcolax tabs & Glycolax. PRN MOM & suppository    6. Endocrinology: no hx of DM II. Last A1C 5.9%  - Continue insulin gtt X 48 hrs  - Will transition to medium corrective dose insulin & Lantus    7. Hematology:  - Acute blood loss anemia. 8.4/25.6. Expected, will continue to monitor.  - Platelets 105 this AM, no prior hx of 
\"BILIDIR\", \"BILITOT\", \"ALKPHOS\" in the last 72 hours.  Recent Labs     01/11/25  1518   INR 0.98       Urine Cultures: No results found for: \"LABURIN\"  Blood Cultures:   Lab Results   Component Value Date/Time    BC No Growth after 4 days of incubation. 02/13/2024 09:35 PM     Lab Results   Component Value Date/Time    BLOODCULT2 No Growth after 4 days of incubation. 02/13/2024 09:39 PM     Organism: No results found for: \"ORG\"      Luisito Washington, APRN - CNP    
beta-blocker   The patient is NOT on an ace-i/ARB secondary to hypotension   The patient is on a statin   The patient is on oral antiplatelet therapy  ________________________________________________________________________  Taniya Choi PA-C  1/21/2025  12:50 PM

## 2025-01-21 NOTE — TELEPHONE ENCOUNTER
----- Message from Dr. Pooja Ma MD sent at 1/21/2025  3:14 PM EST -----  · Follow-up in the pulmonary clinic in 4 to 6 weeks.

## 2025-01-21 NOTE — CARE COORDINATION
Pulm consulted per CTS for pt's continued c/o SOB. Once cleared by Pulm, the Pt can d/c to ARU. ARU liaison updated and aware.

## 2025-01-21 NOTE — DISCHARGE SUMMARY
Cardiac, Vascular & Thoracic Surgery  Discharge Summary    Patient:  Dawood Epps 1955 [id number]   Admission Date:  1/11/2025  2:27 PM  Discharge Date:  1/21/2025    Principle Diagnosis: NSTEMI (non-ST elevated myocardial infarction) (HCC)    Secondary Diagnosis: Principal Problem:    NSTEMI (non-ST elevated myocardial infarction) (HCC)  Active Problems:    Chest pain  Resolved Problems:    * No resolved hospital problems. *    1/13 Cardiac Cath with Dr. Vidal: Coronary Findings  Diagnostic  Dominance: Right  Left Main   Dist LM lesion, 99% stenosed.      Left Anterior Descending   Ost LAD lesion, 99% stenosed.      Left Circumflex   Ost Cx lesion, 99% stenosed.      Right Coronary Artery   Ost RCA lesion, 70% stenosed.      Intervention     No interventions have been documented.     Left Heart  Left Ventricle The left ventricular size is normal. The left ventricular systolic function is normal. LV systolic pressure is normal. LV end diastolic pressure is normal. LV EDP is: 13. The ejection fraction is 65%. The estimated EF = grossly normal. There are (is) no wall motion abnormalities in the left ventricle. The outflow tract is normal.     1/14 Surgery with Dr. Gilbert: CORONARY ARTERY BYPASS GRAFT TIMES FOUR, CARDIOPULOMARY BYPASS, ENDOSCOPIC SAPHENOUS VEIN GRAFT HARVEST, POSTERIOR PERICARDIOTOMY, ULTRASOUND INTERROGATION OF GRAFTS, LEFT ATRIAL APPENDAGE CLIP PLACEMENT, TRANSESOPHAGEAL ECHOCARDIOGRAM, BILATERAL PECTORALIS BLOCKS (Chest)     History: The patient is a 69 y.o. male with significant pmhx of AAA s/p EVAR (10/2020), PAD s/p thrombolysis, L iliac artery stenting (12/2020), carotid artery stenosis s/p bilateral CEA (2018), prior MI, prior Hep C, HTN, HLD, & COPD who presented to the ER on 1/11 with complaints of right shoulder pain.  Patient states he had been experiencing this for shoulder pain for a few days, however he attributed it to his line of work, as patient and his family state he uses his arms

## 2025-01-21 NOTE — CARE COORDINATION
CASE MANAGEMENT DISCHARGE SUMMARY      Discharge to: [facility]    Precertification completed: n/a    IMM given:1/21/25    Transportation: wheelchair       Confirmed discharge plan with: nursing and MD     Patient: yes     Family:  yes     Facility/Agency, name:  [facility] pulled from [facility] per staff   Phone number for report to facility: [phone]    Note: Discharging nurse to complete [facility], reconcile [facility], and place final copy with patient's discharge packet. RN to ensure that written prescriptions for  Level II medications are sent with patient to the facility as per protocol.

## 2025-01-21 NOTE — DISCHARGE INSTR - COC
Clean;Dry;Intact 01/21/25 0800   Incision Cleansed Soap and water;Chlorhexidine 01/20/25 2000   Dressing/Treatment Open to air 01/21/25 0800   Incision Assessment Dry 01/21/25 0800   Drainage Amount None (dry) 01/21/25 0800   Drainage Description Thick;Sanguinous 01/19/25 0830   Odor None 01/21/25 0800   Chelsea-incision Assessment Ecchymosis 01/20/25 2000   Number of days: 7        Elimination:  Continence:   Bowel: Yes  Bladder: Yes  Urinary Catheter: None   Colostomy/Ileostomy/Ileal Conduit: No       Date of Last BM: 1/21/25    Intake/Output Summary (Last 24 hours) at 1/21/2025 1427  Last data filed at 1/21/2025 0500  Gross per 24 hour   Intake 400 ml   Output 450 ml   Net -50 ml     I/O last 3 completed shifts:  In: 1610 [P.O.:1610]  Out: 1400 [Urine:1400]    Safety Concerns:     None    Impairments/Disabilities:      Impaired Hearing    Nutrition Therapy:  Current Nutrition Therapy:   - Oral Diet:  General    Routes of Feeding: Oral  Liquids: No Restrictions  Daily Fluid Restriction: no  Last Modified Barium Swallow with Video (Video Swallowing Test): not done    Treatments at the Time of Hospital Discharge:   Respiratory Treatments:   Oxygen Therapy:  is not on home oxygen therapy.  Ventilator:    - No ventilator support    Rehab Therapies: Physical Therapy  Weight Bearing Status/Restrictions: No weight bearing restrictions  Other Medical Equipment (for information only, NOT a DME order):  cardiac equipment, Sternal   Other Treatments:     Patient's personal belongings (please select all that are sent with patient):  None    RN SIGNATURE:  Electronically signed by Anne Ng RN on 1/21/25 at 7:26 PM EST    CASE MANAGEMENT/SOCIAL WORK SECTION    Inpatient Status Date: 1/11/25    Readmission Risk Assessment Score:  [facility] RISK OF UNPLANNED READMISSION 2.0             13.5 Total Score        Discharging to Facility/ Agency   Name: [facility]  Phone: [phone]  Fax:[phone]    /Social

## 2025-01-21 NOTE — CONSULTS
Pharmacy to Manage Heparin Infusion per Hospital Nomogram    Dx: Elevated troponin  Pt wt = _82.5 kg__ (actual weight)  Baseline aPTT = ordered.     Oral factor Xa-inhibitors may alter and elevate anti-Xa levels used for unfractionated heparin monitoring. As a result, anti-Xa monitoring is not accurate while Xa-inhibitor activity is detectable. Utilize aPTT monitoring when patient received an oral factor Xa-inhibitor (apixaban, betrixaban, edoxaban or rivaroxaban) within 72 hours prior to admission (please document last administration time). The goal is to allow a washout of oral factor Xa-inhibitors by using aPTT for 72 hours, then change to ant-Xa levels for UFH.       Heparin (weight-based) Infusion: CAD/STEMI/NSTEMI/UA/AFib)   Heparin 60 units/kg IVP bolus (max 4,000 units) followed by Heparin infusion at 12 units/kg/hr (recommended max initial rate: 1000 units/hr).  Recheck anti-Xa (unless aPTT being used) in 6 hours.  Goal anti-Xa 0.3-0.7 IU/mL  Goal aPTT =  seconds.    anti-Xa < 0.1            Heparin 60 units/kg bolus (max 4,000 units)    Increase infusion by 4 units/kg/hr  anti-Xa 0.1-0.29       Heparin 30 units/kg bolus (max 2,000 units)    Increase infusion by 2 units/kg/hr  anti-Xa 0.3-0.7         No bolus                                                           No change   anti-Xa 0.71-0.8       No bolus                                                           Decrease infusion by 1 units/kg/hr  anti-Xa 0.81-0.99     No bolus                                                           Decrease infusion by 2 units/kg/hr  anti-Xa 1 or more     Hold heparin for 1 hour                                    Decrease infusion by 3 units/kg/hr    When Anti-Xa  is within target range for two consecutive times, check Anti-Xa once daily with morning labs.     Chelsea Willett, PharmD 1/11/2025  5:34 PM      1/12 at 0034  Anti-Xa: 0.36  Plan: continue current heparin infusion rate  Recheck anti-Xa 0630  Al 
Consult Placed     Who: [facility] ([place])  Date: 1/12/2025  Time: 0806     Electronically signed by Nabila Pulido RN on 1/12/2025 at 8:08 AM  
PULMONARY AND CRITICAL CARE INPATIENT NOTE        Dawood Epps   : 1955  MRN: [id number]     Admitting Physician: No admitting provider for patient encounter.  Attending Physician: Esthela Gilbert MD  PCP: Suha Ruff MD    Admission: 2025   Date of Service: 2025    Chief Complaint   Patient presents with    Shoulder Pain     R shoulder pain w/ numbness for 3-4 days. NKI. Wife concerned about heart issues.            ASSESSMENT & PLAN       69 y.o. pleasant  male patient with:    Assessment:  Persistent shortness of breath  Severe COPD with moderate to severe emphysema and severe obstruction.  On Trelegy 200 and albuterol at baseline.  CAD/non-STEMI with critical LAD stenosis 99%.  Status post CABG THEA ligation .  Complex history of PAD: AAA status post EVAR , PAD status post thrombolysis/iliac stenting, carotid artery stenosis status post CEA times 2018, CVA  Metabolic syndrome: HTN, HLD  50-pack-year smoking history.  Quit 2024.  Qualifies for lung cancer screening overnight      Plan:              Patient reports significant improvement on his breathing when he is home Trelegy inhaler was approved to start using since yesterday.  Continue with home Trelegy to get mics with mouth washing after use  Also felt much better with albuterol nebulizer today and could more.  Will order albuterol nebulizer to be given 15 minutes before rehab therapy sessions.  Also will order nebulizer machine and albuterol to use at home before anticipated exertion.  Congratulated the patient on his complete smoking cessation 8 weeks ago.  Patient did not take nicotine replacement for more than a week since admission and with CT surgery preference of not taking nicotine he is okay to stay away from smoking and nicotine replacement  Continue flutter valve and incentive spirometer  Follow-up in the pulmonary clinic in 4 to 6 weeks.    LOS: Hospital Day: 11     Code:Full 
Patient: Dawood Epps  [id number]  Date: 1/17/2025      Chief Complaint: chest pain    History of Present Illness/Hospital Course:  Dawood Epps is a 69 year old male with a past medical history significant for AAA s/p EVAR (2020), PAD s/p thrombolysis, L iliac artery stenting, carotid artery stenosis s/p bilateral CEA, prior MI, history of hepatitis C, COPD, HTN, and HLD who presented to [facility] on 1/11/25 with right chest and shoulder pain. EKG was negative for ischemic changes, but troponin was elevated. He was admitted with NSTEMI and SUNG. Cardiology was consulted and on 1/13 he underwent LHC which revealed critical disease of distal left main with 99% stenosis. CT Surgery was consulted and on 1/16/25 he underwent CABG x4. Post operatively he was hypotensive requiring levo, but was able to be weaned off on 1/16. Course notable for acute blood loss anemia. He continues to have functional deficits below his baseline. Today he is seen with therapy and his wife present. He remains on oxygen which he is not on at home. He reports feeling well. He endorses being independent at baseline. He works full time as a . He lives with his wife in a trilevel house with 2 [facility]. He is highly motivated to work with therapies and is interested in ARU.      has a past medical history of Aphasia, Asthma, Cerebrovascular disease, COPD (chronic obstructive pulmonary disease) (HCC), Hepatitis C, History of hepatitis C, Hyperlipidemia, Hypertension, MI (myocardial infarction) (HCC), Peripheral vascular disease (HCC), and Stroke (HCC).     has a past surgical history that includes hernia repair (Bilateral, 2017); Carotid artery surgery (Bilateral, 2018); Abdominal aortic aneurysm repair (2020); Tonsillectomy and adenoidectomy; XR Cardiac Cath Procedure (1/13/2025); Cardiac procedure (N/A, 1/13/2025); and Coronary artery bypass graft (N/A, 1/14/2025).     reports that he quit smoking about 12 months ago. His smoking use 
04/08/2024 10:30 AM     PT/INR:  No results found for: \"PTINR\"  HgBA1c:No results found for: \"LABA1C\"  Lab Results   Component Value Date    TROPONINI <0.01 12/26/2017       Lab Results   Component Value Date    CHOL 142 04/08/2024    CHOL 138 03/06/2023    CHOL 187 12/27/2017     Lab Results   Component Value Date    TRIG 87 04/08/2024    TRIG 85 03/06/2023    TRIG 130 12/27/2017     Lab Results   Component Value Date    HDL 46 04/08/2024    HDL 43 03/06/2023    HDL 27 (L) 12/27/2017     No components found for: \"LDLCHOLESTEROL\", \"LDLCALC\"  Lab Results   Component Value Date    VLDL 17 04/08/2024    VLDL 26 12/27/2017     Lab Results   Component Value Date    CHOLHDLRATIO 3.2 03/06/2023        Cardiac Data:     Telemetry personally reviewed: sinus rhythm    Creat 1.4  Troponin 126, 133, 115, 127, 124    EKG 1/11/2025: NSR @ 68 bpm with incomplete RBBB and nonspecific ST abnormality    Echo 12/26/2017: Summary   Normal left ventricle systolic function with an estimated ejection fraction of 55%.   No regional wall motion abnormalities are seen.   Normal left ventricular diastolic filling pressure.   A bubble study was performed and fails to show evidence of shunting.   Aortic valve leaflets appear sclerotic.   Systolic pulmonary artery pressure (SPAP) is normal and estimated at 24 mmHg   (RA pressure 3 mmHg).    Stress Test:    Cardiac catheterization:    Additional studies:   CT Chest 02/13/2024: IMPRESSION:  No evidence of a pulmonary embolus     Mildly dilated and moderately atherosclerotic thoracic aorta with no aneurysm  or dissection     Moderate coronary artery calcifications     Small lymph nodes scattered in the mediastinum which are not pathologic by  size criteria suggest follow-up to assure stability     Bullous emphysematous changes of the lungs with hazy reticulonodular and  ground-glass infiltrates scattered along the right lower lobe and less  prominent reticulonodular opacities scattered on the left 
normal. Trace regurgitation. No stenosis noted.   Aorta Normal sized aortic root and ascending aorta.   IVC/Hepatic Veins IVC diameter is less than or equal to 21 mm and decreases greater than 50% during inspiration; therefore the estimated right atrial pressure is normal (~3 mmHg). IVC size is normal.   Pericardium No pericardial effusion.     1/13 CT Chest: FINDINGS:  LUNGS AND AIRWAYS: Airways are patent. Mild centrilobular emphysematous changes  are identified. In the right lower lobe there are linear and groundglass  opacities noted posteriorly.     PLEURA: No pleural effusions or pleural thickening.     HEART AND GREAT VESSELS: Cardiac size appears normal. Moderate coronary  atherosclerotic calcifications are identified. Aortic valvular calcifications  are also noted. Small protrusion or ulceration of the aortic arch identified  without significant change measuring approximately 11 mm in longitudinal length  by 10 mm transversely. The thoracic aorta is normal in diameter and  configuration.     ADENOPATHY/MEDIASTINUM: No adenopathy.     CHEST WALL / LOWER NECK: No significant abnormality.     UPPER ABDOMEN: Left renal small calculi are noted.     BONES: No evidence of osteolytic process. Diffuse mild thoracic degenerative  disc space narrowing is identified.     IMPRESSION:  1. Moderate coronary atherosclerosis.  2. Aortic valvular calcifications are noted.  3. Small aortic arch ulceration as described.  4. Probable left nephrolithiasis, nonobstructive.    1/13 Carotid US: Interpretation Summary    Mild (<50%) stenosis in the right internal carotid artery.  Homogeneous plaque in the right internal carotid artery.    Mild (<50%) stenosis in the left internal carotid artery.    Normal antegrade flow involving the right vertebral artery.    Normal antegrade flow involving the left vertebral artery.    Right Carotid  Patient is s/p carotid endarterectomy.     Internal Carotid Artery: Mild (<50%) stenosis. Minimal

## 2025-01-22 LAB
ANION GAP SERPL CALCULATED.3IONS-SCNC: 8 MMOL/L (ref 3–16)
BASOPHILS # BLD: 0.1 K/UL (ref 0–0.2)
BASOPHILS NFR BLD: 0.8 %
BUN SERPL-MCNC: 25 MG/DL (ref 7–20)
CALCIUM SERPL-MCNC: 10 MG/DL (ref 8.3–10.6)
CHLORIDE SERPL-SCNC: 105 MMOL/L (ref 99–110)
CO2 SERPL-SCNC: 27 MMOL/L (ref 21–32)
CREAT SERPL-MCNC: 1.4 MG/DL (ref 0.8–1.3)
DEPRECATED RDW RBC AUTO: 16.2 % (ref 12.4–15.4)
EOSINOPHIL # BLD: 0.6 K/UL (ref 0–0.6)
EOSINOPHIL NFR BLD: 6.5 %
GFR SERPLBLD CREATININE-BSD FMLA CKD-EPI: 54 ML/MIN/{1.73_M2}
GLUCOSE SERPL-MCNC: 110 MG/DL (ref 70–99)
HCT VFR BLD AUTO: 28.5 % (ref 40.5–52.5)
HGB BLD-MCNC: 9.7 G/DL (ref 13.5–17.5)
LYMPHOCYTES # BLD: 1.9 K/UL (ref 1–5.1)
LYMPHOCYTES NFR BLD: 21.9 %
MCH RBC QN AUTO: 30.5 PG (ref 26–34)
MCHC RBC AUTO-ENTMCNC: 33.9 G/DL (ref 31–36)
MCV RBC AUTO: 90.1 FL (ref 80–100)
MONOCYTES # BLD: 1.2 K/UL (ref 0–1.3)
MONOCYTES NFR BLD: 14.3 %
NEUTROPHILS # BLD: 4.8 K/UL (ref 1.7–7.7)
NEUTROPHILS NFR BLD: 56.5 %
PLATELET # BLD AUTO: 455 K/UL (ref 135–450)
PMV BLD AUTO: 7.9 FL (ref 5–10.5)
POTASSIUM SERPL-SCNC: 4.7 MMOL/L (ref 3.5–5.1)
RBC # BLD AUTO: 3.17 M/UL (ref 4.2–5.9)
SODIUM SERPL-SCNC: 140 MMOL/L (ref 136–145)
WBC # BLD AUTO: 8.6 K/UL (ref 4–11)

## 2025-01-22 PROCEDURE — 97110 THERAPEUTIC EXERCISES: CPT

## 2025-01-22 PROCEDURE — 97530 THERAPEUTIC ACTIVITIES: CPT

## 2025-01-22 PROCEDURE — 6360000002 HC RX W HCPCS: Performed by: STUDENT IN AN ORGANIZED HEALTH CARE EDUCATION/TRAINING PROGRAM

## 2025-01-22 PROCEDURE — 36415 COLL VENOUS BLD VENIPUNCTURE: CPT

## 2025-01-22 PROCEDURE — 97162 PT EVAL MOD COMPLEX 30 MIN: CPT

## 2025-01-22 PROCEDURE — 80048 BASIC METABOLIC PNL TOTAL CA: CPT

## 2025-01-22 PROCEDURE — 97116 GAIT TRAINING THERAPY: CPT

## 2025-01-22 PROCEDURE — 97166 OT EVAL MOD COMPLEX 45 MIN: CPT

## 2025-01-22 PROCEDURE — 6370000000 HC RX 637 (ALT 250 FOR IP): Performed by: STUDENT IN AN ORGANIZED HEALTH CARE EDUCATION/TRAINING PROGRAM

## 2025-01-22 PROCEDURE — 94640 AIRWAY INHALATION TREATMENT: CPT

## 2025-01-22 PROCEDURE — 85025 COMPLETE CBC W/AUTO DIFF WBC: CPT

## 2025-01-22 PROCEDURE — 1280000000 HC REHAB R&B

## 2025-01-22 PROCEDURE — 97535 SELF CARE MNGMENT TRAINING: CPT

## 2025-01-22 RX ORDER — MECOBALAMIN 5000 MCG
5 TABLET,DISINTEGRATING ORAL NIGHTLY PRN
Status: DISCONTINUED | OUTPATIENT
Start: 2025-01-22 | End: 2025-01-29 | Stop reason: HOSPADM

## 2025-01-22 RX ADMIN — HEPARIN SODIUM 5000 UNITS: 5000 INJECTION INTRAVENOUS; SUBCUTANEOUS at 13:41

## 2025-01-22 RX ADMIN — POTASSIUM CHLORIDE 10 MEQ: 750 TABLET, EXTENDED RELEASE ORAL at 10:33

## 2025-01-22 RX ADMIN — CLOPIDOGREL BISULFATE 75 MG: 75 TABLET ORAL at 10:32

## 2025-01-22 RX ADMIN — FERROUS SULFATE TAB 325 MG (65 MG ELEMENTAL FE) 325 MG: 325 (65 FE) TAB at 17:48

## 2025-01-22 RX ADMIN — ALBUTEROL SULFATE 2.5 MG: 2.5 SOLUTION RESPIRATORY (INHALATION) at 20:26

## 2025-01-22 RX ADMIN — Medication 400 MG: at 10:32

## 2025-01-22 RX ADMIN — PREGABALIN 50 MG: 25 CAPSULE ORAL at 20:38

## 2025-01-22 RX ADMIN — HEPARIN SODIUM 5000 UNITS: 5000 INJECTION INTRAVENOUS; SUBCUTANEOUS at 05:53

## 2025-01-22 RX ADMIN — ACETAMINOPHEN 1000 MG: 500 TABLET ORAL at 13:41

## 2025-01-22 RX ADMIN — FAMOTIDINE 20 MG: 20 TABLET, FILM COATED ORAL at 10:32

## 2025-01-22 RX ADMIN — ACETAMINOPHEN 1000 MG: 500 TABLET ORAL at 05:53

## 2025-01-22 RX ADMIN — ACETYLCYSTEINE 600 MG: 200 INHALANT RESPIRATORY (INHALATION) at 20:26

## 2025-01-22 RX ADMIN — ATORVASTATIN CALCIUM 40 MG: 40 TABLET, FILM COATED ORAL at 20:38

## 2025-01-22 RX ADMIN — METHOCARBAMOL 750 MG: 750 TABLET ORAL at 20:38

## 2025-01-22 RX ADMIN — METHOCARBAMOL 750 MG: 750 TABLET ORAL at 13:41

## 2025-01-22 RX ADMIN — ASPIRIN 81 MG: 81 TABLET, CHEWABLE ORAL at 10:32

## 2025-01-22 RX ADMIN — METHOCARBAMOL 750 MG: 750 TABLET ORAL at 17:48

## 2025-01-22 RX ADMIN — METHOCARBAMOL 750 MG: 750 TABLET ORAL at 10:33

## 2025-01-22 RX ADMIN — HEPARIN SODIUM 5000 UNITS: 5000 INJECTION INTRAVENOUS; SUBCUTANEOUS at 20:47

## 2025-01-22 RX ADMIN — ACETAMINOPHEN 1000 MG: 500 TABLET ORAL at 20:37

## 2025-01-22 RX ADMIN — METOPROLOL TARTRATE 37.5 MG: 25 TABLET, FILM COATED ORAL at 10:30

## 2025-01-22 RX ADMIN — METOPROLOL TARTRATE 37.5 MG: 25 TABLET, FILM COATED ORAL at 20:38

## 2025-01-22 RX ADMIN — FUROSEMIDE 20 MG: 20 TABLET ORAL at 10:32

## 2025-01-22 RX ADMIN — OXYCODONE HYDROCHLORIDE 10 MG: 5 TABLET ORAL at 20:46

## 2025-01-22 RX ADMIN — OXYCODONE HYDROCHLORIDE 5 MG: 5 TABLET ORAL at 05:53

## 2025-01-22 RX ADMIN — ACETYLCYSTEINE 600 MG: 200 INHALANT RESPIRATORY (INHALATION) at 07:33

## 2025-01-22 RX ADMIN — PREGABALIN 50 MG: 25 CAPSULE ORAL at 10:33

## 2025-01-22 RX ADMIN — OXYCODONE HYDROCHLORIDE 5 MG: 5 TABLET ORAL at 10:29

## 2025-01-22 RX ADMIN — FERROUS SULFATE TAB 325 MG (65 MG ELEMENTAL FE) 325 MG: 325 (65 FE) TAB at 10:38

## 2025-01-22 RX ADMIN — ALBUTEROL SULFATE 2.5 MG: 2.5 SOLUTION RESPIRATORY (INHALATION) at 07:33

## 2025-01-22 ASSESSMENT — PAIN DESCRIPTION - LOCATION
LOCATION: CHEST
LOCATION: CHEST

## 2025-01-22 ASSESSMENT — PAIN SCALES - GENERAL
PAINLEVEL_OUTOF10: 4
PAINLEVEL_OUTOF10: 0

## 2025-01-22 ASSESSMENT — PAIN - FUNCTIONAL ASSESSMENT: PAIN_FUNCTIONAL_ASSESSMENT: ACTIVITIES ARE NOT PREVENTED

## 2025-01-22 ASSESSMENT — PAIN DESCRIPTION - DESCRIPTORS: DESCRIPTORS: DISCOMFORT

## 2025-01-22 ASSESSMENT — PAIN DESCRIPTION - ORIENTATION: ORIENTATION: MID

## 2025-01-22 NOTE — H&P
Patient: Dawood Epps  [id number]  Date: 1/22/2025      Chief Complaint: chest pain    History of Present Illness/Hospital Course:  Dawood Epps is a 69 year old male with a past medical history significant for AAA s/p EVAR (2020), PAD s/p thrombolysis, L iliac artery stenting, carotid artery stenosis s/p bilateral CEA, prior MI, history of hepatitis C, COPD, HTN, and HLD who presented to [facility] on 1/11/25 with right chest and shoulder pain. EKG was negative for ischemic changes, but troponin was elevated. He was admitted with NSTEMI and SUNG. Cardiology was consulted and on 1/13 he underwent LHC which revealed critical disease of distal left main with 99% stenosis. CT Surgery was consulted and on 1/16/25 he underwent CABG x4. Post operatively he was hypotensive requiring levo, but was able to be weaned off on 1/16. Course notable for acute blood loss anemia. He was admitted to [facility] on 1/21 due to functional deficits below his baseline. Today Dawood is seen without family present. He reports some continued shortness of breath. He reports that his last bowel movement was today. He works full time as a . He lives with his wife in a trilevel house with 2 ROHIT.     Prior Level of Function:  Independent for self care, indoor mobility, stairs, functional cognition     Current Level of Function:  Supervision for sit to stand, chair/bed transfer, toilet transfer  Max assist for toileting hygiene, putting on/taking off footwear, roll left and right, sit to lying, lying to sitting on side of bed  Dependent for lower body dressing     has a past medical history of Aphasia, Asthma, Cerebrovascular disease, COPD (chronic obstructive pulmonary disease) (HCC), Hepatitis C, History of hepatitis C, Hyperlipidemia, Hypertension, MI (myocardial infarction) (HCC), Peripheral vascular disease (HCC), and Stroke (HCC).     has a past surgical history that includes hernia repair (Bilateral, 2017); Carotid artery surgery

## 2025-01-22 NOTE — PATIENT CARE CONFERENCE
[facility]  Inpatient Rehabilitation  Weekly Team Conference Note    Date: 2025  Patient Name: Dawood Epps        MRN: [id number]    : 1955  (69 y.o.)  Gender: male   Referring Practitioner: Claudia Yap MD  Diagnosis: CABGx4      Interventions to be utilized toward barriers to discharge, per discipline:  NURSING  Nursing observed barriers to dc: Pain, Upper extremity weakness, Lower extremity weakness, and Medical complications  Nursing interventions: Assist with ADLs, pain management, wound management  Family Education: No  Fall Risk:  Yes    Stay with me?: No    PHYSICAL THERAPY  Physical therapy observed barriers to dc:    Baseline: independent mobility no AD, lives with spouse in multi level house   Current level: up to min A bed mobility, CGA transfers and gait up to 220 ft with no AD, 8 stairs with CGA    Barriers to DC: endurance, stairs in home, sternal precautions, level of assist at home    Needs in order to achieve dc home/next level of care: mod-ind mobility, home  A initially, HHPT, anticipate no DME needs       Physical therapy interventions:   Current Treatment Recommendations: Strengthening, ROM, Functional mobility training, Balance training, Transfer training, Gait training, Stair training, Neuromuscular re-education, Equipment evaluation, education, & procurement, Therapeutic activities, Endurance training, Home exercise program, Safety education & training, Patient/Caregiver education & training    PT Goals:            Short Term Goals  Time Frame for Short Term Goals: 4 days (25)  Short Term Goal 1: pt will complete bed mobility with supv  Short Term Goal 2: pt will perform functional transfers with LRAD and supv  Short Term Goal 3: pt will ambulate 150 ft with LRAD and supv  Short Term Goal 4: pt will perform 4 stairs with HR and supv            Long Term Goals  Time Frame for Long Term Goals : 11 days (25)  Long Term Goal 1: pt will  DME recs and provide at discharge as appropriate.        Interdisciplinary Goals:   1.) pt will perform functional transfers with no AD independently   2.)Pt will perform toilet transfer with Mod I    3.)  4.)  5.)      []  Family Training discussed at conference and to be scheduled.      Discharge Plan   Estimated discharge date: 2/1/25  Destination: home health  Pass: No  Services at Discharge: Home Health  Physical Therapy, Occupational Therapy, and Nursing   Equipment at Discharge: TTB    Team Members Present at Conference:  : Cornelia Grover RN    Occupational Therapist: Checo Chandler, OTR/L    Physical Therapist: Umer Frank, PT  Speech Therapist: N/A  Nurse: Yen Ty, EMILIANA  Dietician: Jennifer Palma RD  Scribe: Crystal Doan, PT, DPT  : Trinidad Claire, OTR/L  Psychiatry: N/A    Family members present at conference: No      I led this team conference and I approve the established interdisciplinary plan of care as documented within the medical record of Dawood Epps.    MD:  NICHOLAS VenturaO. M.P.H  PM&R  1/23/2025  3:07 PM

## 2025-01-22 NOTE — RT PROTOCOL NOTE
RT Inhaler-Nebulizer Bronchodilator Protocol Note    There is a bronchodilator order in the chart from a provider indicating to follow the RT Bronchodilator Protocol and there is an “Initiate RT Inhaler-Nebulizer Bronchodilator Protocol” order as well (see protocol at bottom of note).    CXR Findings:  XR CHEST PORTABLE    Result Date: 1/21/2025  Consolidative left basilar airspace disease. Small left pleural effusion. Stable right basilar atelectasis. Electronically signed by Byrnn Diaz DO    XR CHEST PORTABLE    Result Date: 1/20/2025  Bibasilar densities favor subsegmental atelectasis. No significant change. Electronically signed by MD Reg Brandon      The findings from the last RT Protocol Assessment were as follows:   History Pulmonary Disease: Smoker 15 pack years or more  Respiratory Pattern: Regular pattern and RR 12-20 bpm  Breath Sounds: Slightly diminished and/or crackles  Cough: Strong, spontaneous, non-productive  Indication for Bronchodilator Therapy: On home bronchodilators  Bronchodilator Assessment Score: 3    Aerosolized bronchodilator medication orders have been revised according to the RT Inhaler-Nebulizer Bronchodilator Protocol below.    Respiratory Therapist to perform RT Therapy Protocol Assessment initially then follow the protocol.  Repeat RT Therapy Protocol Assessment PRN for score 0-3 or on second treatment, BID, and PRN for scores above 3.    No Indications - adjust the frequency to every 6 hours PRN wheezing or bronchospasm, if no treatments needed after 48 hours then discontinue using Per Protocol order mode.     If indication present, adjust the RT bronchodilator orders based on the Bronchodilator Assessment Score as indicated below.  Use Inhaler orders unless patient has one or more of the following: on home nebulizer, not able to hold breath for 10 seconds, is not alert and oriented, cannot activate and use MDI correctly, or respiratory rate 25 breaths per minute or more, then

## 2025-01-22 NOTE — PROGRESS NOTES
Physical Therapy  Facility/Department: [facility]  Rehabilitation Physical Therapy Initial Assessment/Treatment    NAME: Dawood Epps  : 1955 (69 y.o.)  MRN: [id number]  CODE STATUS: Full Code    Date of Service: 25      Past Medical History:   Diagnosis Date    Aphasia 2017    Asthma     Cerebrovascular disease     COPD (chronic obstructive pulmonary disease) (HCC)     Hepatitis C     History of hepatitis C 2017    Treated with pills in the past    Hyperlipidemia     Hypertension     MI (myocardial infarction) (HCC)     Peripheral vascular disease (HCC)     left upper leg stent per pt    Stroke (HCC)      Past Surgical History:   Procedure Laterality Date    ABDOMINAL AORTIC ANEURYSM REPAIR  2020    CARDIAC CATH PROCEDURE  2025    CARDIAC PROCEDURE N/A 2025    Left heart cath / coronary angiography performed by Quiana Vidal MD at [facility] CARDIAC CATH LAB    CAROTID ARTERY SURGERY Bilateral 2018    CORONARY ARTERY BYPASS GRAFT N/A 2025    CORONARY ARTERY BYPASS GRAFT TIMES FOUR, CARDIOPULOMARY BYPASS, ENDOSCOPIC SAPHENOUS VEIN GRAFT HARVEST, POSTERIOR PERICARDIOTOMY, ULTRASOUND INTERROGATION OF GRAFTS, LEFT ATRIAL APPENDAGE CLIP PLACEMENT, TRANSESOPHAGEAL ECHOCARDIOGRAM, BILATERAL PECTORALIS BLOCKS performed by Esthela Gilbert MD at [facility] CVOR    HERNIA REPAIR Bilateral 2017    bilateral inguinal hernia repair    TONSILLECTOMY AND ADENOIDECTOMY         Chart Reviewed: Yes  Patient assessed for rehabilitation services?: Yes  Family/Caregiver Present: Yes (daughter)  Referring Practitioner: Claudia Yap MD  Referral Date : 25  Diagnosis: CABGx4  General  General Comments: pt found in bed, denies pain at rest    Restrictions:  Restrictions/Precautions: General Precautions;Cardiac  Position Activity Restriction  Sternal Precautions: 5# Lifting Restrictions;No Pushing;No Pulling     SUBJECTIVE  Subjective: pt in bed on entry, agreeable to therapy, daughter  present      Prior Level of Function:  Social/Functional History  Lives With: Spouse  Type of Home: House  Home Layout: Multi-level (trilevel)  Home Access: Stairs to enter with rails  Entrance Stairs - Number of Steps: 2 ROHIT LHR, 5 steps to bedroom LHR  Bathroom Shower/Tub: Tub/Shower unit  Bathroom Toilet: Standard  Bathroom Equipment: None  Home Equipment: Crutches  Has the patient had two or more falls in the past year or any fall with injury in the past year?: No  Prior Level of Assist for ADLs: Independent  Prior Level of Assist for Homemaking: Independent  Prior Level of Assist for Ambulation: Independent household ambulator, with or without device, Independent community ambulator, with or without device (no AD)  Prior Level of Assist for Transfers: Independent  Active : Yes  Occupation: Full time employment  Type of Occupation:  at school  Additional Comments: wife works (soon to retire); PRN A available      OBJECTIVE  Vision  Vision: Impaired  Vision Exceptions: Wears glasses for reading    Hearing  Hearing: Exceptions to [facility]  Hearing Exceptions: Hard of hearing/hearing concerns;No hearing aid    Cognition  Overall Cognitive Status: WFL  Cognition Comment: able to recall 3/3 sternal precautions but requires cues to maintain occasionally    ROM  AROM RLE (degrees)  RLE AROM: WFL  AROM LLE (degrees)  LLE AROM : WFL    Strength  Strength RLE  Strength RLE: WFL  Comment: grossly 4+/5  Strength LLE  Strength LLE: WFL  Comment: grossly 4+/5      Sensation  Overall Sensation Status: WNL    Functional Mobility  Bed mobility  Rolling to Left: Contact guard assistance  Rolling to Right: Contact guard assistance  Supine to Sit: Minimal assistance  Sit to Supine: Minimal assistance  Scooting: Contact guard assistance  Bed Mobility Comments: no bed rails, HOB flat  Transfers  Sit to Stand: Contact guard assistance  Stand to Sit: Contact guard assistance  Bed to Chair: Contact guard assistance (no

## 2025-01-22 NOTE — PLAN OF CARE
ARU PATIENT TREATMENT PLAN  [facility]  [address] [place]  [address]  [phone]    Dawood Mich    : 1955  [facility] #: [id number]  MRN: [id number]   PHYSICIAN:  Claudia Yap MD  Primary Problem    Patient Active Problem List   Diagnosis    History of CVA (cerebrovascular accident)    HTN (hypertension), benign    Dyslipidemia    Environmental allergies    History of carotid stenosis    Demand ischemia (HCC)    Bullous emphysema (HCC)    NSTEMI (non-ST elevated myocardial infarction) (HCC)    Chest pain    CAD (coronary artery disease)    S/P CABG (coronary artery bypass graft)       Rehabilitation Diagnosis:     S/P CABG (coronary artery bypass graft) [Z95.1]       ADMIT DATE:2025    Patient Goals: \"Be able to do my normal things and go home\"     Admitting Impairments: Pt. Admitted s/p CABG resulting in Decreased functional mobility ;Decreased ADL status;Decreased endurance;Decreased balance;Decreased posture;Decreased high-level IADLs   Barriers: level of assistance, endurance, comorbidities   Participation: Good     CARE PLAN     NURSING:  Dawood Epps while on this unit will:     [x] Be continent of bowel and bladder     [x] Have an adequate number of bowel movements  [x] Urinate with no urinary retention >300ml in bladder  [] Complete bladder protocol with raines removal  [x] Maintain O2 SATs at >90%  [x] Have pain managed while on ARU       [] Be pain free by discharge   [x] Have no skin breakdown while on ARU  [x] Have improved skin integrity via wound measurements  [x] Have no signs/symptoms of infection at the wound site  [x] Be free from injury during hospitalization   [x] Complete education with patient/family with understanding demonstrated for:  [] Adjustment   [] Other:   Nursing interventions may include bowel/bladder training, education for medical assistive devices, medication education, O2 saturation management, energy conservation, stress

## 2025-01-22 NOTE — CARE COORDINATION
CM update: Spoke with wife Anika and patient's daughter at bedside to introduce myself and to reinforce CM role in discharge planning. No questions/concerns from family at this time. Assured family that I would keep them updated when discharge recommendations have been made.     Cornelia Grover, RN

## 2025-01-22 NOTE — PROGRESS NOTES
Patient was discharged from the ICU room 223 at this time and transferred by wheelchair to the ARU room 160. Report given to ARU RN. All of the patient's belongings were packed and sent with him to the ARU. No complications at this time.

## 2025-01-22 NOTE — PROGRESS NOTES
Comprehensive Nutrition Assessment    Type and Reason for Visit:  Initial, Consult    Nutrition Recommendations/Plan:   Continue regular diet, encourage intake  Continue Ensure BID, prefers chocolate  Monitor nutrition adequacy, pertinent labs, bowel habits, wt changes, and clinical progress     Malnutrition Assessment:  Malnutrition Status:  No malnutrition (01/22/25 1209)    Context:  Acute Illness     Findings of the 6 clinical characteristics of malnutrition:  Energy Intake:  Mild decrease in energy intake  Weight Loss:  Mild weight loss     Body Fat Loss:  Unable to assess     Muscle Mass Loss:  Unable to assess    Fluid Accumulation:  No fluid accumulation     Strength:  Not Performed    Nutrition Assessment:    Patient assessed as new ARU admit. Unable to speak with pt as he was with other dicipline at visit, hx obtained via chart review. Patient admitted from acute status where he underwent CABG x4 on 1/16. Patient is tolerating a regular diet with good po intake and accepting ONS as well. Bowels are moving and regimen scheduled. Noted wt loss throughout previous admit, likely fluid related. RD will complete full interview and NFPE as able at f/u and provide intervention as appropriate.    Nutrition Related Findings:    +BM 1/22, , A1C 5.9         Current Nutrition Intake & Therapies:    Average Meal Intake: 51-75%, %, 26-50%  Average Supplements Intake: 51-75%, %  ADULT DIET; Regular  ADULT ORAL NUTRITION SUPPLEMENT; Breakfast; Standard High Calorie/High Protein Oral Supplement    Anthropometric Measures:  Height: 185.4 cm (6' 0.99\")  Ideal Body Weight (IBW): 184 lbs (84 kg)    Current Body Weight: 78.3 kg (172 lb 9.9 oz),   IBW. Weight Source: Standing scale  Current BMI (kg/m2): 22.8    Estimated Daily Nutrient Needs:  Energy Requirements Based On: Kcal/kg  Weight Used for Energy Requirements: Current  Energy (kcal/day): 1987-0569 (25-28 kcal/kg CBW)  Weight Used for Protein

## 2025-01-22 NOTE — PROGRESS NOTES
IRF CLIF Admission Assessment  [facility] Acute Rehab Unit    Patient:Dawood Epps     Rehab Dx/Hx: S/P CABG (coronary artery bypass graft) [Z95.1]   :1955  MRN:[id number]  Date of Admit: 2025     Pain Effect on Sleep:  Over the past 5 days, how much of the time has pain made it hard for you to sleep at night?  []  0. Does not apply - I have not had any pain or hurting in the past 5 days  [x]  1. Rarely or not at all  []  2. Occasionally  []  3. Frequently  []  4. Almost constantly  []  8. Unable to answer    Over the past 5 days, how often have you limited your participation in rehabilitation therapy sessions due to pain?  [x]  0. Does not apply - I have not received rehabilitation therapy in the past 5 days  []  1. Rarely or not at all  []  2. Occasionally  []  3. Frequently  []  4. Almost constantly  []  8. Unable to answer    Pain Interference with Day-to-Day Activities:  Over the past 5 days, how often have you limited your day-to-day activities (excluding rehabilitation therapy session)?  [x]  1. Rarely or not at all  []  2. Occasionally  []  3. Frequently  []  4. Almost constantly  []  8. Unable to answer      High-Risk Drug Classes: Use and Indication   Is taking: Check if the pt is taking any medications by pharmacological classification  Indication noted: If column 1 is checked, check if there is an indication noted for all meds in the drug class Is taking  (check all that apply) Indication noted (check all that apply)   Antipsychotic [] []   Anticoagulant [x] [x]   Antibiotic [] []   Opioid [x] [x]   Antiplatelet [] []   Hypoglycemic (including insulin) [] []   None of the above [] []     Special Treatments, Procedures, and Programs   Check all of the following treatments, procedures, and programs that apply on admission.  On admission (check all that apply)   Cancer Treatments    A1. Chemotherapy []   A2. IV []   A3. Oral []   A10. Other []   B1. Radiation []   Respiratory Therapies    C1.  Oxygen Therapy []   C2. Continuous []   C3. Intermittent []   C4. High-concentration []   D1. Suctioning []   D2. Scheduled []   D3. As needed []   E1. Tracheostomy Care []   F1. Invasive Mechanical Ventilator (ventilator or respirator) []   G1. Non-invasive Mechanical Ventilator []   G2. BiPAP []   G3. CPAP []   Other    H1. IV Medications []   H2. Vasoactive medications []   H3. Antibiotics []   H4. Anticoagulation []   H10. Other []   I1. Transfusions []   J1. Dialysis []   J2. Hemodialysis []   J3. Peritoneal dialysis []   O1. IV access []   O2. Peripheral []   O3. Midline []   O4. Central (PICC, tunneled, port) []   None of the above [x]     Signature: Cate Lin RN

## 2025-01-22 NOTE — CARE COORDINATION
Case Management ARU Admission Assessment   [facility]    Patient:Dawood Epps     Rehab Dx/Hx: S/P CABG (coronary artery bypass graft) [Z95.1]   :1955  MRN:[id number]  Date of Admit: 2025  Room #: 0160/0160-02       Objective:  met with the patient to complete initial assessment and review the role of Case Management while on the ARU. Patient educated on team conferences. Discussed family training with the patient/family on how it is encouraged on the unit. Order for \"discharge planning\" has been addressed.          Family Present: none   Primary : Wife Anika Epps [phone]   Health Care Decision Maker:   Primary Decision Maker: Anika Roque - Beth   Current PCP:  Suha Ruff MD       Admit date:  2025   Insurance: Medicare A / [place] BCBS   Precert required for SNF:  [x] No       [] Yes   3 Night stay required: [] No       [x] Yes   Admitting diagnosis: S/P CABG (coronary artery bypass graft) [Z95.1]       Current home situation: Lives with wife Anika and 14yr old nephew in multi-level house w/ 2STE   DME at home: [] Walker     [] Cane       [] RTS        [] BSC      [] Shower Chair        [] O2       [] HHN     [] CPAP       [] BiPap      [] Hospital Bed       [x] W/C  ?      [] Other:    Medical concerns requiring training: CABG restrictions   Medication concerns:  Require financial assistance with meds? 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 Denies transportation barriers   Has

## 2025-01-22 NOTE — PROGRESS NOTES
Occupational Therapy  Facility/Department: [facility]  Rehabilitation Occupational Therapy Evaluation and Treatment Note       Date: 25  Patient Name: Dawood Epps       Room: 0160/0160-02  MRN: [id number]  Account: [id number]   : 1955  (69 y.o.) Gender: male     Referring Practitioner: JADA Yap  Diagnosis: NSTEMI, CABG x4 2025     Restrictions  Restrictions/Precautions: General Precautions;Cardiac    Subjective  Subjective: No c/o pain      Vitals  Respirations: 17  Oxygen Therapy  SpO2: 99 %  O2 Device: None (Room air)      Objective     Cognition  Overall Cognitive Status: WFL  Orientation  Overall Orientation Status: Within Normal Limits  Orientation Level: Oriented X4          Fine Motor Skills  Coordination  Movements Are Fluid And Coordinated: Yes        Social/Functional History  Lives With: Spouse  Type of Home: House  Home Layout: Multi-level (trilevel)  Home Access: Stairs to enter with rails  Entrance Stairs - Number of Steps: 2 ROHIT LHR, 5 steps to bedroom LHR  Bathroom Shower/Tub: Tub/Shower unit  Bathroom Toilet: Standard  Bathroom Equipment: None  Home Equipment: Crutches  Has the patient had two or more falls in the past year or any fall with injury in the past year?: No  Prior Level of Assist for ADLs: Independent  Prior Level of Assist for Homemaking: Independent  Prior Level of Assist for Ambulation: Independent household ambulator, with or without device;Independent community ambulator, with or without device  Prior Level of Assist for Transfers: Independent  Active : Yes  Occupation: Full time employment  Type of Occupation:  at school  Additional Comments: wife works (soon to retire    Functional Mobility  Balance  Sitting Balance: Supervision  Standing Balance: Minimal assistance (CGA to min A for dynamic ADL tasks)  Transfers  Stand Pivot Transfers: Contact guard assistance (no device)  Sit to stand: Minimal assistance (Min A)  Stand to sit: Minimal

## 2025-01-22 NOTE — PROGRESS NOTES
NURSING ASSESSMENT: ARU ADMISSION  [facility]    Patient:Dawood Epps     Rehab Dx/Hx: CABG   :1955  MRN:[id number]  Date of Admit: 2025  Room #: 0160/0160-02    Subjective:   Patient admitted to room 160 from ICU via Wheelchair.   Alert and oriented x4.  Oriented to room and call light system. Oriented to rehab routine and therapy schedules. Informed about care conferences and ordering of meals with PCA.    Drug / Medication Review:   Medications were reviewed by RN at time of admission  [x]  No potential or actual clinically significant medication issues were noted.   []  Potential or actual clinically significant medication issues were found and MD was notified. (Specified below if applicable)   []  Allergy to medication   []  Drug interactions (drug/drug, drug/food, drug/disease interactions)   []  Duplicate drug   []  Omission (drug missing from prescribed regimen)   []  Non adherence   []  Adverse reaction   []  Wrong patient, drug, dose, route, time error   []  Ineffective drug therapy    Section GG: Rolling Right and Left   []  Code 06, Independent: if the patient completes the activity by themself with no assistance from a helper.   []  Code 05, Setup or clean up assist: if the helper sets up or cleans up; patient completes activity   [x]  Code 04, Supervision or touching assist: If the helper provides verbal cues and/or touching/steadying and/or contact guard assistance as patient completes activity   []  Code 03, Partial/Moderate Assist: If the helper does LESS THAN HALF the effort. Mastic lifts, holds, or supports trunk or limbs, but provides less than half the effort.   []  Code 02, Substantial/Maximal Assist: if the helper does MORE THAN HALF the effort. Mastic lifts or holds trunk or limbs and provides more than half the effort.  []  Code 01,Dependent: If the helper does ALL of the effort. Patient does none of the effort to complete the activity; or the assistance of

## 2025-01-23 PROCEDURE — 6370000000 HC RX 637 (ALT 250 FOR IP): Performed by: STUDENT IN AN ORGANIZED HEALTH CARE EDUCATION/TRAINING PROGRAM

## 2025-01-23 PROCEDURE — 6360000002 HC RX W HCPCS: Performed by: STUDENT IN AN ORGANIZED HEALTH CARE EDUCATION/TRAINING PROGRAM

## 2025-01-23 PROCEDURE — 97110 THERAPEUTIC EXERCISES: CPT

## 2025-01-23 PROCEDURE — 97535 SELF CARE MNGMENT TRAINING: CPT

## 2025-01-23 PROCEDURE — 1280000000 HC REHAB R&B

## 2025-01-23 PROCEDURE — 97530 THERAPEUTIC ACTIVITIES: CPT

## 2025-01-23 PROCEDURE — 94640 AIRWAY INHALATION TREATMENT: CPT

## 2025-01-23 PROCEDURE — 97116 GAIT TRAINING THERAPY: CPT

## 2025-01-23 RX ADMIN — METHOCARBAMOL 750 MG: 750 TABLET ORAL at 22:14

## 2025-01-23 RX ADMIN — FERROUS SULFATE TAB 325 MG (65 MG ELEMENTAL FE) 325 MG: 325 (65 FE) TAB at 08:56

## 2025-01-23 RX ADMIN — POTASSIUM CHLORIDE 10 MEQ: 750 TABLET, EXTENDED RELEASE ORAL at 08:56

## 2025-01-23 RX ADMIN — CLOPIDOGREL BISULFATE 75 MG: 75 TABLET ORAL at 08:56

## 2025-01-23 RX ADMIN — FAMOTIDINE 20 MG: 20 TABLET, FILM COATED ORAL at 08:56

## 2025-01-23 RX ADMIN — Medication 400 MG: at 08:54

## 2025-01-23 RX ADMIN — METHOCARBAMOL 750 MG: 750 TABLET ORAL at 17:33

## 2025-01-23 RX ADMIN — HEPARIN SODIUM 5000 UNITS: 5000 INJECTION INTRAVENOUS; SUBCUTANEOUS at 14:21

## 2025-01-23 RX ADMIN — METHOCARBAMOL 750 MG: 750 TABLET ORAL at 14:22

## 2025-01-23 RX ADMIN — PREGABALIN 50 MG: 25 CAPSULE ORAL at 08:54

## 2025-01-23 RX ADMIN — OXYCODONE HYDROCHLORIDE 5 MG: 5 TABLET ORAL at 17:44

## 2025-01-23 RX ADMIN — ASPIRIN 81 MG: 81 TABLET, CHEWABLE ORAL at 08:55

## 2025-01-23 RX ADMIN — ACETAMINOPHEN 1000 MG: 500 TABLET ORAL at 14:22

## 2025-01-23 RX ADMIN — METOPROLOL TARTRATE 37.5 MG: 25 TABLET, FILM COATED ORAL at 08:55

## 2025-01-23 RX ADMIN — HEPARIN SODIUM 5000 UNITS: 5000 INJECTION INTRAVENOUS; SUBCUTANEOUS at 22:18

## 2025-01-23 RX ADMIN — FUROSEMIDE 20 MG: 20 TABLET ORAL at 08:54

## 2025-01-23 RX ADMIN — ATORVASTATIN CALCIUM 40 MG: 40 TABLET, FILM COATED ORAL at 22:14

## 2025-01-23 RX ADMIN — FERROUS SULFATE TAB 325 MG (65 MG ELEMENTAL FE) 325 MG: 325 (65 FE) TAB at 17:34

## 2025-01-23 RX ADMIN — ALBUTEROL SULFATE 2.5 MG: 2.5 SOLUTION RESPIRATORY (INHALATION) at 19:13

## 2025-01-23 RX ADMIN — ACETAMINOPHEN 1000 MG: 500 TABLET ORAL at 06:02

## 2025-01-23 RX ADMIN — ALBUTEROL SULFATE 2.5 MG: 2.5 SOLUTION RESPIRATORY (INHALATION) at 14:54

## 2025-01-23 RX ADMIN — ACETAMINOPHEN 1000 MG: 500 TABLET ORAL at 22:14

## 2025-01-23 RX ADMIN — METOPROLOL TARTRATE 37.5 MG: 25 TABLET, FILM COATED ORAL at 22:14

## 2025-01-23 RX ADMIN — OXYCODONE HYDROCHLORIDE 10 MG: 5 TABLET ORAL at 22:13

## 2025-01-23 RX ADMIN — HEPARIN SODIUM 5000 UNITS: 5000 INJECTION INTRAVENOUS; SUBCUTANEOUS at 06:02

## 2025-01-23 RX ADMIN — METHOCARBAMOL 750 MG: 750 TABLET ORAL at 08:56

## 2025-01-23 RX ADMIN — PREGABALIN 50 MG: 25 CAPSULE ORAL at 22:14

## 2025-01-23 ASSESSMENT — PAIN DESCRIPTION - LOCATION
LOCATION: CHEST;LEG
LOCATION: CHEST

## 2025-01-23 ASSESSMENT — PAIN DESCRIPTION - ORIENTATION: ORIENTATION: RIGHT;LEFT

## 2025-01-23 ASSESSMENT — PAIN SCALES - GENERAL
PAINLEVEL_OUTOF10: 3
PAINLEVEL_OUTOF10: 7
PAINLEVEL_OUTOF10: 6

## 2025-01-23 ASSESSMENT — PAIN DESCRIPTION - DESCRIPTORS: DESCRIPTORS: ACHING;DISCOMFORT

## 2025-01-23 ASSESSMENT — PAIN - FUNCTIONAL ASSESSMENT: PAIN_FUNCTIONAL_ASSESSMENT: ACTIVITIES ARE NOT PREVENTED

## 2025-01-23 NOTE — PROGRESS NOTES
REYNOL[facility] BALANCE SCALE 14-Item Long Form Original Version    Patient Name:  Dawood Epps  Date:  1/23/2025    1. SITTING TO STANDING  INSTRUCTIONS: Please stand up. Try not to use your hands for support.  [x]4 able to stand without using hands and stabilize independently  []3 able to stand independently using hands  []2 able to stand using hands after several tries  []1 needs minimal aid to stand or to stabilize  []0 needs moderate or maximal assist to stand  Score: 4    2. STANDING UNSUPPORTED  INSTRUCTIONS: Please stand for two minutes without holding.  []4 able to stand safely 2 minutes   [x]3 able to stand 2 minutes with supervision  []2 able to stand 30 seconds unsupported  []1 needs several tries to stand 30 seconds unsupported  []0 unable to stand 30 seconds unassisted If a subject is able to stand 2  minutes unsupported, score full points for sitting unsupported. Proceed to  item #4.  Score: 3    3. SITTING WITH BACK UNSUPPORTED BUT FEET SUPPORTED  ON FLOOR OR ON A STOOL  INSTRUCTIONS: Please sit with arms folded for 2 minutes.  [x]4 able to sit safely and securely 2 minutes  []3 able to sit 2 minutes under supervision  []2 able to sit 30 seconds  []1 able to sit 10 seconds  []0 unable to sit without support 10 seconds  Score:  4     4. STANDING TO SITTING  INSTRUCTIONS: Please sit down.  [x]4 sits safely with minimal use of hands  []3 controls descent by using hands  []2 uses back of legs against chair to control descent  []1 sits independently but has uncontrolled descent  []0 needs assistance to sit  Score: 4    5. TRANSFERS  INSTRUCTIONS: Arrange chairs(s) for a pivot transfer. Ask subject to  transfer one way toward a seat with armrests and one way toward a seat  without armrests. You may use two chairs (one with and one without  armrests) or a bed and a chair.  []4 able to transfer safely with minor use of hands  []3 able to transfer safely definite need of hands  [x]2 able to transfer with verbal

## 2025-01-23 NOTE — PLAN OF CARE
Problem: Chronic Conditions and Co-morbidities  Goal: Patient's chronic conditions and co-morbidity symptoms are monitored and maintained or improved  Outcome: Progressing     Problem: Safety - Adult  Goal: Free from fall injury  Outcome: Progressing     Problem: ABCDS Injury Assessment  Goal: Absence of physical injury  Outcome: Progressing     Problem: Pain  Goal: Verbalizes/displays adequate comfort level or baseline comfort level  Outcome: Progressing     Problem: Nutrition Deficit:  Goal: Optimize nutritional status  Outcome: Progressing

## 2025-01-23 NOTE — PROGRESS NOTES
Physical Therapy  Facility/Department: [facility]  Rehabilitation Physical Therapy Treatment Note    NAME: Dawood Epps  : 1955 (69 y.o.)  MRN: [id number]  CODE STATUS: Full Code    Date of Service: 25       Restrictions:  Restrictions/Precautions: General Precautions, Cardiac  Position Activity Restriction  Sternal Precautions: 5# Lifting Restrictions, No Pushing, No Pulling     SUBJECTIVE  Subjective: Pt seated in chair, pleasant and agreeable to PT tx  Pain: Denies c/o pain       OBJECTIVE  Orientation  Overall Orientation Status: Within Normal Limits  Orientation Level: Oriented X4    Functional Mobility  Balance  Sitting Balance: Independent  Standing Balance: Stand by assistance  Standing Balance  Activity: SBA without AD    Transfers  Surface: From chair with arms  Additional Factors: Verbal cues;Hand placement cues;Increased time to complete (min cues for sternal px)  Device:  (no AD)  Sit to Stand  Assistance Level: Stand by assist  Skilled Clinical Factors: multiple reps no AD  Stand to Sit  Skilled Clinical Factors: multiple reps no AD      Environmental Mobility  Ambulation  Surface: Level surface  Device:  (no AD)  Distance: 150' x 2  Activity: Within Room;Within Unit  Activity Comments: Ditsances limited by fatigue with seated rest break. HR elevated to 106 with seated rest break to recover  Additional Factors: Verbal cues;Increased time to complete  Assistance Level: Stand by assist  Gait Deviations: Slow juan;Decreased arm swing bilateral;Decreased trunk rotation;Decreased heel strike right;Decreased heel strike left;Narrow base of support  Skilled Clinical Factors: Slighlty narrowed JUAN CARLOS, B decreased toe clearance and heel strike. Mildly unsteady without overt LOB.     Dynamic gait/balance:  X 2 set each bout with SBA for balance:  Bout 1: Gait x 5' forwards, 5' backwards, 10' forwards, 10' backwards  Bout 2: Figure 8 around cones ~10' apart  Bout 3: R side step x 5', L sidestep x 5', R  training;Patient/Caregiver education & training;Wheelchair mobility training;Modalities;Positioning  Safety Devices  Type of Devices: All fall risk precautions in place;Call light within reach;Chair alarm in place;Gait belt;Left in chair;Nurse notified    EDUCATION  Education  Education Given To: Patient  Education Provided: Role of Therapy;Plan of Care;Safety;Mobility Training;Transfer Training;Fall Prevention Strategies;Precautions;Family Education;Energy Conservation;Equipment  Education Provided Comments: safety with mobility, sternal px, goals, safe progression of mobility, monitoring vitals  Education Method: Demonstration;Verbal  Barriers to Learning: None  Education Outcome: Verbalized understanding;Continued education needed        Therapy Time   Individual Concurrent Group Co-treatment   Time In 0930         Time Out 1000         Minutes 30           Timed Code Treatment Minutes: 30 Minutes       Second Session Therapy Time:   Individual Concurrent Group Co-treatment   Time In 1300         Time Out 1400         Minutes 60           Timed Code Treatment Minutes:  60 minutes    Total Treatment Minutes:  90 minutes    Le Moser PT, DPT JULIANNA 01/23/25 at 4:14 PM

## 2025-01-23 NOTE — PROGRESS NOTES
Department of Physical Medicine & Rehabilitation  Progress Note    Patient Identification:  Dawood Epps  [id number]  : 1955  Admit date: 2025    Chief Complaint: S/P CABG (coronary artery bypass graft)    Subjective:   No acute events overnight. Patient seen this am.  Eval and therapy yesterday showed he is min assist and able to ambulate 220 feet.  He was able to go up 8 stairs.  Plan for discharge on 2025.  Patient has no new issues overnight.  Team conference today.    ROS: No f/c, n/v, cp     Objective:  Patient Vitals for the past 24 hrs:   BP Temp Temp src Pulse Resp SpO2 Weight   25 1454 -- -- -- -- -- 95 % --   25 0854 126/77 97.7 °F (36.5 °C) Oral 88 20 96 % --   25 0529 -- -- -- -- -- -- 78.7 kg (173 lb 8 oz)   25 134/64 -- -- 88 16 -- --   25 -- -- -- -- -- 95 % --     Const: Alert. No distress, pleasant.   HEENT: Normocephalic, atraumatic. Normal sclera/conjunctiva. MMM.   CV: Regular rate and rhythm.   Resp: No respiratory distress. Lungs CTAB.   Abd: Soft, nontender, nondistended, NABS+   Ext: + edema.   Neuro: Alert, oriented, appropriately interactive.   Psych: Cooperative, appropriate mood and affect    Laboratory data: Available via EMR.   Last 24 hour lab  No results found for this or any previous visit (from the past 24 hour(s)).    Therapy progress:  PT  Position Activity Restriction  Sternal Precautions: 5# Lifting Restrictions, No Pushing, No Pulling  Objective     Sit to Stand: Contact guard assistance  Stand to Sit: Contact guard assistance  Bed to Chair: Contact guard assistance (no AD)  Device: No Device  Assistance: Contact guard assistance, Stand by assistance  Distance: 160 ft x2 + 220 ft (level surface) + 10 ft (uneven surface)  OT  PT Equipment Recommendations  Equipment Needed: No  Other: anticipate none, will CTA for need while in ARU  Toilet - Technique: Ambulating (no device)  Assessment        SLP          Body mass index

## 2025-01-23 NOTE — CARE COORDINATION
CM update: Spoke with patient in room to discuss discharge planning. Informed patient that discharge is planned for Saturday 2/1 and that HHC is recommended. Patient immediately responds saying, \"I don't need that\". Discussed benefits of HHC and continued therapy after discharge. Patient states, \"I will think about it.\" Patient is aware that I will plan to call and discuss with his wife Anika. No further questions from patient at this time. Will continue to follow.    Called and left voicemail for wife Anika requesting her to call back to discuss discharge planning for patient. Will await call back.    Cornelia Grover RN

## 2025-01-23 NOTE — PROGRESS NOTES
Occupational Therapy  Facility/Department: [facility]  Rehabilitation Occupational Therapy Daily Treatment Note    Date: 25  Patient Name: Dawood Epps       Room: 0160/0160-02  MRN: [id number]  Account: [id number]   : 1955  (69 y.o.) Gender: male                    Past Medical History:  has a past medical history of Aphasia, Asthma, Cerebrovascular disease, COPD (chronic obstructive pulmonary disease) (HCC), Hepatitis C, History of hepatitis C, Hyperlipidemia, Hypertension, MI (myocardial infarction) (HCC), Peripheral vascular disease (HCC), and Stroke (HCC).  Past Surgical History:   has a past surgical history that includes hernia repair (Bilateral, ); Carotid artery surgery (Bilateral, ); Abdominal aortic aneurysm repair (); Tonsillectomy and adenoidectomy; XR Cardiac Cath Procedure (2025); Cardiac procedure (N/A, 2025); and Coronary artery bypass graft (N/A, 2025).    Restrictions  Restrictions/Precautions: General Precautions, Cardiac  Required Braces or Orthoses?: No    Subjective  Subjective: pt in bed at OT arrival. Agreeable to treatment. Denies pain. /77, HR 88, 96% SPo2 on RA  Restrictions/Precautions: General Precautions;Cardiac             Objective     Orientation  Overall Orientation Status: Within Normal Limits  Orientation Level: Oriented X4         ADL  Feeding  Assistance Level: Independent  Skilled Clinical Factors: breakfast EOB  Lower Extremity Dressing  Assistance Level: Contact guard assist  Skilled Clinical Factors: standing to manage to/from waist, stands to unthread BLE from brief, figure 4 in seated to thread BLE through briefs/pants, education on adhering to sternal px during LB dressing and ECON  Putting On/Taking Off Footwear  Assistance Level: Supervision  Skilled Clinical Factors: figure 4 to don socks, dons shoes at EOB        Fxl ambulation: EOB > recliner without AD, CGA   Bed Mobility  Overall Assistance Level: Contact Guard

## 2025-01-23 NOTE — CARE COORDINATION
Weekly team care conference with interdisciplinary team on: 1/23/25    Chart reviewed for length of stay. IP day # 2  Unit: ARU    Diagnosis and current status as per MD progress: S/P CABG  Consultants Following: physical medicine  Planned Discharge Date: 2/1/2025  Durable medical equipment needed: TTB  Discharge Plan: Home with family and [facility]    Cornelia Grover RN

## 2025-01-24 ENCOUNTER — APPOINTMENT (OUTPATIENT)
Dept: GENERAL RADIOLOGY | Age: 70
DRG: 949 | End: 2025-01-24
Attending: STUDENT IN AN ORGANIZED HEALTH CARE EDUCATION/TRAINING PROGRAM
Payer: COMMERCIAL

## 2025-01-24 LAB
ANION GAP SERPL CALCULATED.3IONS-SCNC: 11 MMOL/L (ref 3–16)
BASOPHILS # BLD: 0 K/UL (ref 0–0.2)
BASOPHILS NFR BLD: 0.5 %
BUN SERPL-MCNC: 26 MG/DL (ref 7–20)
CALCIUM SERPL-MCNC: 9.8 MG/DL (ref 8.3–10.6)
CHLORIDE SERPL-SCNC: 102 MMOL/L (ref 99–110)
CO2 SERPL-SCNC: 24 MMOL/L (ref 21–32)
CREAT SERPL-MCNC: 1.4 MG/DL (ref 0.8–1.3)
DEPRECATED RDW RBC AUTO: 16.5 % (ref 12.4–15.4)
EOSINOPHIL # BLD: 0.5 K/UL (ref 0–0.6)
EOSINOPHIL NFR BLD: 5.7 %
GFR SERPLBLD CREATININE-BSD FMLA CKD-EPI: 54 ML/MIN/{1.73_M2}
GLUCOSE SERPL-MCNC: 104 MG/DL (ref 70–99)
HCT VFR BLD AUTO: 27.6 % (ref 40.5–52.5)
HGB BLD-MCNC: 9.3 G/DL (ref 13.5–17.5)
LYMPHOCYTES # BLD: 1.8 K/UL (ref 1–5.1)
LYMPHOCYTES NFR BLD: 20.7 %
MCH RBC QN AUTO: 30.3 PG (ref 26–34)
MCHC RBC AUTO-ENTMCNC: 33.7 G/DL (ref 31–36)
MCV RBC AUTO: 89.9 FL (ref 80–100)
MONOCYTES # BLD: 1 K/UL (ref 0–1.3)
MONOCYTES NFR BLD: 11.6 %
NEUTROPHILS # BLD: 5.3 K/UL (ref 1.7–7.7)
NEUTROPHILS NFR BLD: 61.5 %
PLATELET # BLD AUTO: 565 K/UL (ref 135–450)
PMV BLD AUTO: 7.7 FL (ref 5–10.5)
POTASSIUM SERPL-SCNC: 4 MMOL/L (ref 3.5–5.1)
RBC # BLD AUTO: 3.07 M/UL (ref 4.2–5.9)
SODIUM SERPL-SCNC: 137 MMOL/L (ref 136–145)
WBC # BLD AUTO: 8.6 K/UL (ref 4–11)

## 2025-01-24 PROCEDURE — 71045 X-RAY EXAM CHEST 1 VIEW: CPT

## 2025-01-24 PROCEDURE — 6370000000 HC RX 637 (ALT 250 FOR IP): Performed by: STUDENT IN AN ORGANIZED HEALTH CARE EDUCATION/TRAINING PROGRAM

## 2025-01-24 PROCEDURE — 97110 THERAPEUTIC EXERCISES: CPT

## 2025-01-24 PROCEDURE — 1280000000 HC REHAB R&B

## 2025-01-24 PROCEDURE — 85025 COMPLETE CBC W/AUTO DIFF WBC: CPT

## 2025-01-24 PROCEDURE — 36415 COLL VENOUS BLD VENIPUNCTURE: CPT

## 2025-01-24 PROCEDURE — 97530 THERAPEUTIC ACTIVITIES: CPT

## 2025-01-24 PROCEDURE — 97116 GAIT TRAINING THERAPY: CPT

## 2025-01-24 PROCEDURE — 80048 BASIC METABOLIC PNL TOTAL CA: CPT

## 2025-01-24 PROCEDURE — 6360000002 HC RX W HCPCS: Performed by: STUDENT IN AN ORGANIZED HEALTH CARE EDUCATION/TRAINING PROGRAM

## 2025-01-24 PROCEDURE — 97535 SELF CARE MNGMENT TRAINING: CPT

## 2025-01-24 PROCEDURE — 94640 AIRWAY INHALATION TREATMENT: CPT

## 2025-01-24 RX ADMIN — PREGABALIN 50 MG: 25 CAPSULE ORAL at 20:06

## 2025-01-24 RX ADMIN — FERROUS SULFATE TAB 325 MG (65 MG ELEMENTAL FE) 325 MG: 325 (65 FE) TAB at 08:11

## 2025-01-24 RX ADMIN — FUROSEMIDE 20 MG: 20 TABLET ORAL at 08:14

## 2025-01-24 RX ADMIN — HEPARIN SODIUM 5000 UNITS: 5000 INJECTION INTRAVENOUS; SUBCUTANEOUS at 13:51

## 2025-01-24 RX ADMIN — Medication 400 MG: at 08:10

## 2025-01-24 RX ADMIN — ATORVASTATIN CALCIUM 40 MG: 40 TABLET, FILM COATED ORAL at 20:06

## 2025-01-24 RX ADMIN — ACETAMINOPHEN 1000 MG: 500 TABLET ORAL at 20:07

## 2025-01-24 RX ADMIN — ACETAMINOPHEN 1000 MG: 500 TABLET ORAL at 13:51

## 2025-01-24 RX ADMIN — METOPROLOL TARTRATE 37.5 MG: 25 TABLET, FILM COATED ORAL at 20:06

## 2025-01-24 RX ADMIN — OXYCODONE HYDROCHLORIDE 10 MG: 5 TABLET ORAL at 20:07

## 2025-01-24 RX ADMIN — ALBUTEROL SULFATE 2.5 MG: 2.5 SOLUTION RESPIRATORY (INHALATION) at 20:11

## 2025-01-24 RX ADMIN — METHOCARBAMOL 750 MG: 750 TABLET ORAL at 20:06

## 2025-01-24 RX ADMIN — HEPARIN SODIUM 5000 UNITS: 5000 INJECTION INTRAVENOUS; SUBCUTANEOUS at 20:12

## 2025-01-24 RX ADMIN — ALBUTEROL SULFATE 2.5 MG: 2.5 SOLUTION RESPIRATORY (INHALATION) at 08:09

## 2025-01-24 RX ADMIN — PREGABALIN 50 MG: 25 CAPSULE ORAL at 08:10

## 2025-01-24 RX ADMIN — OXYCODONE HYDROCHLORIDE 10 MG: 5 TABLET ORAL at 08:12

## 2025-01-24 RX ADMIN — FERROUS SULFATE TAB 325 MG (65 MG ELEMENTAL FE) 325 MG: 325 (65 FE) TAB at 17:19

## 2025-01-24 RX ADMIN — ACETAMINOPHEN 1000 MG: 500 TABLET ORAL at 06:17

## 2025-01-24 RX ADMIN — FLUTICASONE PROPIONATE 2 SPRAY: 50 SPRAY, METERED NASAL at 13:51

## 2025-01-24 RX ADMIN — METHOCARBAMOL 750 MG: 750 TABLET ORAL at 17:19

## 2025-01-24 RX ADMIN — METOPROLOL TARTRATE 37.5 MG: 25 TABLET, FILM COATED ORAL at 08:09

## 2025-01-24 RX ADMIN — METHOCARBAMOL 750 MG: 750 TABLET ORAL at 13:51

## 2025-01-24 RX ADMIN — CLOPIDOGREL BISULFATE 75 MG: 75 TABLET ORAL at 08:11

## 2025-01-24 RX ADMIN — FAMOTIDINE 20 MG: 20 TABLET, FILM COATED ORAL at 08:11

## 2025-01-24 RX ADMIN — POTASSIUM CHLORIDE 10 MEQ: 750 TABLET, EXTENDED RELEASE ORAL at 08:11

## 2025-01-24 RX ADMIN — HEPARIN SODIUM 5000 UNITS: 5000 INJECTION INTRAVENOUS; SUBCUTANEOUS at 06:17

## 2025-01-24 RX ADMIN — METHOCARBAMOL 750 MG: 750 TABLET ORAL at 08:11

## 2025-01-24 RX ADMIN — ASPIRIN 81 MG: 81 TABLET, CHEWABLE ORAL at 08:11

## 2025-01-24 ASSESSMENT — PAIN DESCRIPTION - PAIN TYPE: TYPE: SURGICAL PAIN

## 2025-01-24 ASSESSMENT — PAIN SCALES - GENERAL
PAINLEVEL_OUTOF10: 7
PAINLEVEL_OUTOF10: 10

## 2025-01-24 ASSESSMENT — PAIN DESCRIPTION - FREQUENCY: FREQUENCY: CONTINUOUS

## 2025-01-24 ASSESSMENT — PAIN DESCRIPTION - DESCRIPTORS
DESCRIPTORS: ACHING
DESCRIPTORS: ACHING

## 2025-01-24 ASSESSMENT — PAIN DESCRIPTION - ORIENTATION: ORIENTATION: MID

## 2025-01-24 ASSESSMENT — PAIN DESCRIPTION - ONSET: ONSET: ON-GOING

## 2025-01-24 ASSESSMENT — PAIN DESCRIPTION - LOCATION
LOCATION: GENERALIZED
LOCATION: STERNUM

## 2025-01-24 ASSESSMENT — PAIN - FUNCTIONAL ASSESSMENT: PAIN_FUNCTIONAL_ASSESSMENT: ACTIVITIES ARE NOT PREVENTED

## 2025-01-24 NOTE — PROGRESS NOTES
Department of Physical Medicine & Rehabilitation  Progress Note    Patient Identification:  Dawood Epps  [id number]  : 1955  Admit date: 2025    Chief Complaint: S/P CABG (coronary artery bypass graft)    Subjective:   Seen in his room this morning.  Patient wants to go home soon as possible.  Discussed with the patient that he requires endurance training and cardiac rehab before planned discharge home.  He is agreeable to this plan.  No new pain overnight.  Continues to participate well in therapy.  ROS: No f/c, n/v, cp     Objective:  Patient Vitals for the past 24 hrs:   BP Temp Temp src Pulse Resp SpO2 Weight   25 0904 131/67 -- -- 82 -- 95 % --   25 0842 -- -- -- -- 18 -- --   25 0800 135/78 98.2 °F (36.8 °C) Oral 88 18 92 % --   25 0510 -- -- -- -- -- -- 78.9 kg (173 lb 15.1 oz)   25 2210 113/71 98 °F (36.7 °C) Oral 95 16 96 % --   25 1915 -- -- -- -- 16 -- --   25 1744 -- -- -- -- 16 -- --     Const: Alert. No distress, pleasant.   HEENT: Normocephalic, atraumatic. Normal sclera/conjunctiva. MMM.   CV: Regular rate and rhythm.   Resp: No respiratory distress. Lungs CTAB.   Abd: Soft, nontender, nondistended, NABS+   Ext: + edema.   Neuro: Alert, oriented, appropriately interactive.   Psych: Cooperative, appropriate mood and affect    Laboratory data: Available via EMR.   Last 24 hour lab  Recent Results (from the past 24 hour(s))   Basic Metabolic Panel w/ Reflex to MG    Collection Time: 25  7:08 AM   Result Value Ref Range    Sodium 137 136 - 145 mmol/L    Potassium reflex Magnesium 4.0 3.5 - 5.1 mmol/L    Chloride 102 99 - 110 mmol/L    CO2 24 21 - 32 mmol/L    Anion Gap 11 3 - 16    Glucose 104 (H) 70 - 99 mg/dL    BUN 26 (H) 7 - 20 mg/dL    Creatinine 1.4 (H) 0.8 - 1.3 mg/dL    Est, Glom Filt Rate 54 (A) >60    Calcium 9.8 8.3 - 10.6 mg/dL   CBC with Auto Differential    Collection Time: 25  7:08 AM   Result Value Ref Range    WBC 8.6 4.0

## 2025-01-24 NOTE — PROGRESS NOTES
Occupational Therapy  Facility/Department: [facility]  Rehabilitation Occupational Therapy Daily Treatment Note    Date: 25  Patient Name: Dawood Epps       Room: 0160/0160-02  MRN: [id number]  Account: [id number]   : 1955  (69 y.o.) Gender: male                    Past Medical History:  has a past medical history of Aphasia, Asthma, Cerebrovascular disease, COPD (chronic obstructive pulmonary disease) (HCC), Hepatitis C, History of hepatitis C, Hyperlipidemia, Hypertension, MI (myocardial infarction) (HCC), Peripheral vascular disease (HCC), and Stroke (HCC).  Past Surgical History:   has a past surgical history that includes hernia repair (Bilateral, ); Carotid artery surgery (Bilateral, ); Abdominal aortic aneurysm repair (); Tonsillectomy and adenoidectomy; XR Cardiac Cath Procedure (2025); Cardiac procedure (N/A, 2025); and Coronary artery bypass graft (N/A, 2025).    Restrictions  Restrictions/Precautions: General Precautions, Cardiac  Required Braces or Orthoses?: No    Subjective  Subjective: pt in chair at OT arrival. Reports needing to use restroom. Denies pain. /76, HR 94  Restrictions/Precautions: General Precautions;Cardiac             Objective     Cognition  Overall Cognitive Status: WFL  Arousal/Alertness: Appears intact  Following Commands: Follows multistep commands consistently;Follows multistep commands with increased time  Attention Span: Difficulty dividing attention  Memory: Appears intact  Safety Judgement: Decreased awareness of need for assistance  Problem Solving: Assistance required to generate solutions;Assistance required to implement solutions  Insights: Decreased awareness of deficits  Initiation: Requires cues for some  Sequencing: Requires cues for some  Orientation  Overall Orientation Status: Within Normal Limits  Orientation Level: Oriented X4         ADL  Toileting  Assistance Level: Supervision  Skilled Clinical Factors: standing at  toilet to urinate          Functional Mobility  Device:  (no AD)  Activity: To/From therapy gym;To/From bathroom  Assistance Level: Stand by assist  Transfers  Surface: To chair with arms;From chair with arms  Additional Factors: Verbal cues;Hand placement cues;Increased time to complete  Sit to Stand  Assistance Level: Stand by assist  Stand to Sit  Assistance Level: Stand by assist   OT Exercises  Resistive Exercises: 3# dumbbell, 2 x 20-25 reps bicep curls, IR/ER, supination/pronation, shoulder abd with elbow flexed, shoulder flexion to 90*; therapeutic rest break between bouts and verbal cues for technique     Dynamic standing balance: Pt ambulates throughout gym to collect puzzle pieces and stands on black balance board to put puzzle together. Pt stands for 2:45 and 3 min with therapeutic rest break between bouts. Pt educated on PLB. Task terminated d/t frustration. Pt requires CGA to close SBA when on balance board.     Assessment  Assessment  Assessment: First Session: Pt tolerates session well. Progresses to SPV for toileting in standing. Pt ambulates with SBA without AD during session. Completes dynamic standing balance task with SBA-CGA, performed for balance and ax tolerance for carry over to ADL performance. Continues to demo decreased ax tolerance and decreased strength. Reports he was sore after therapy yesterday. Cont OT POC.   Second Session: Pt in chair at start of session. Pt performs sit <> stands with SBA (recliner, chair, EOM, TTB) and fxl ambulation without AD to/from therapy gym and around therapy gym with SBA. Pt educated on use of TTB, requires SBA for transfer with verbal cues. Pt reports he does not feel safe side stepping over tub. Will recommend TTB. Discussed type of tub pt has, agreeable to have wife take a picture to confirm TTB will fit. Pt then ambulates throughout therapy gym with SBA to collect various resistive clips at heights below shoulders (to adhere to sternal px), requires

## 2025-01-24 NOTE — PROGRESS NOTES
PROGRESS NOTE    Patient known to service. He is POD #10 s/p CABG X 4 with Dr. Gilbert. He was discharged on 1/21 to the ARU on 4 days of Lasix. Our team has been peripherally following along/chart checking to assess his mobility and pulmonary function upon discharge.  While his postoperative course was primarily complicated by tachycardia which resolved by day of discharge, there was also some concern regarding his ambulatory status and compliance with pulmonary expansion. During his recovery period, patient required frequent encouragement and education on pulmonary expansion measures.  He continually stated that he felt as though he was not able fully to participate with PT or perform his lung exercises due to his SOB, and he was requesting his Trelegy. His Trelegy was restarted and his ambulatory status improved as well. He became medically appropriate for discharge to ARU on 1/21. Our team decided to discharge him on 4 days of oral Lasix, and we had scheduled a CXR for this AM to peripherally monitor for improvement of his postop atelectasis.    I went and spoke with patient this afternoon. He looks and feels much better.  States he has been working well with physical therapy and he no longer has chest pain. He continues to endorse some shortness of breath when working with physical therapy, however he says this is greatly improved from the shortness of breath he had been experiencing prior to surgery.  He denies any shortness of breath at rest.  I explained to him that his CXR this morning showed evidence of left-sided fluid buildup that could potentially benefit from a thoracentesis. Noted to have diminished lung sounds in left posterior and anterior base. Patient questions were answered, however he was hesitant to undergo thoracentesis today and stated \"I will think about it and talk to my wife about it.\"  Patient instructed to notify ARU staff, who will notify CTS team if patient should change his mind and

## 2025-01-24 NOTE — PROGRESS NOTES
Physical Therapy  Facility/Department: [facility]  Rehabilitation Physical Therapy Treatment Note    NAME: Dawood Epps  : 1955 (69 y.o.)  MRN: [id number]  CODE STATUS: Full Code    Date of Service: 25       Restrictions:  Restrictions/Precautions: General Precautions, Cardiac  Position Activity Restriction  Sternal Precautions: 5# Lifting Restrictions, No Pushing, No Pulling     SUBJECTIVE  Subjective: pt found asleep in bed, agreeable to therapy  Pain: reports no pain, but \"sore all over\"       OBJECTIVE  Orientation  Overall Orientation Status: Within Normal Limits  Orientation Level: Oriented X4    Functional Mobility  Supine to Sit  Assistance Level: Moderate assistance  Skilled Clinical Factors: for trunk to sit up EOB  Scooting  Assistance Level: Supervision  Balance  Sitting Balance: Independent  Standing Balance: Stand by assistance  Standing Balance  Activity: SBA without AD  Transfers  Surface: From bed;To chair with arms;From chair with arms  Additional Factors: Verbal cues;Hand placement cues;Increased time to complete (min cues for sternal px)  Device:  (no AD)  Sit to Stand  Assistance Level: Supervision  Skilled Clinical Factors: multiple reps no AD  Stand to Sit  Assistance Level: Supervision  Skilled Clinical Factors: multiple reps no AD      Environmental Mobility  Ambulation  Surface: Level surface  Device:  (no AD)  Distance: 520 ft + 150 ft  Activity: Within Room;Within Unit  Activity Comments: Ditsances limited by fatigue  Additional Factors: Verbal cues;Increased time to complete  Assistance Level: Stand by assist  Gait Deviations: Slow juan;Decreased arm swing bilateral;Decreased trunk rotation;Decreased heel strike right;Decreased heel strike left;Narrow base of support  Skilled Clinical Factors: Slighlty narrowed JUAN CARLOS, B decreased toe clearance and heel strike. Mildly unsteady without overt LOB. cues to place arms at side instead of pillow  Stairs  Stair Height: 6''  Device:  sec   - 30 sec STS: 6 reps   - walking speed:    - 20 ft walked in 5.55 sec = 1.09 m/s   Other gait:    - 150 ft x2 with no AD and SBA    - 150 ft with cues for increased walking speed, SBA and overtly stable with no LOB       Transfers:  - multiple reps STS from various surfaces, supv with no AD   - stand step transfers with no AD and supv       Education provided on progressions in exercises, d/c recommendations including purpose of length of stay, and results of testing    Pt left in chair, alarm on, call light and other needs left within reach     Assessment: pt tolerated second session well, with focus more on increasing speed of movements safely to return closer to prior level of function. Pt participated well and only slightly limited by fatigue, able to participate well without LOB or difficulty, but does require occasional cues for sternal precautions. Pt would continue to benefit from progressions in strength, endurance, balance, and independence with mobility prior to d/c.     Goals  Patient Goals   Patient Goals : \"to go home\"  Short Term Goals  Time Frame for Short Term Goals: 4 days (1/24/25)  Short Term Goal 1: pt will complete bed mobility with supv - NOT MET 1/24, mod A for trunk  Short Term Goal 2: pt will perform functional transfers with LRAD and supv - MET 1/24, supv with no AD  Short Term Goal 3: pt will ambulate 150 ft with LRAD and supv - PARTIALLY MET 1/24, meets distance goal with no AD, but SBA due to instability  Short Term Goal 4: pt will perform 4 stairs with HR and supv - PARTIALLY MET 1/24, completes 12 steps but SBA due to instability  Short Term Goal 5: .  Long Term Goals  Time Frame for Long Term Goals : 11 days (1/31/25)  Long Term Goal 1: pt will complete bed mobility with mod-ind  Long Term Goal 2: pt will perform functional transfers with LRAD and mod-ind  Long Term Goal 3: pt will ambulate 200 ft with LRAD and independent  Long Term Goal 4: pt will perform 8 stairs with

## 2025-01-24 NOTE — CARE COORDINATION
CM update: Attempt made again to call and discuss discharge recommendations with wife Anika. Patient currently in therapy. Voicemail left- will await call back.    Cornelia Grover RN

## 2025-01-25 PROCEDURE — 6360000002 HC RX W HCPCS: Performed by: STUDENT IN AN ORGANIZED HEALTH CARE EDUCATION/TRAINING PROGRAM

## 2025-01-25 PROCEDURE — 97110 THERAPEUTIC EXERCISES: CPT

## 2025-01-25 PROCEDURE — 97530 THERAPEUTIC ACTIVITIES: CPT

## 2025-01-25 PROCEDURE — 6370000000 HC RX 637 (ALT 250 FOR IP): Performed by: STUDENT IN AN ORGANIZED HEALTH CARE EDUCATION/TRAINING PROGRAM

## 2025-01-25 PROCEDURE — 97116 GAIT TRAINING THERAPY: CPT

## 2025-01-25 PROCEDURE — 1280000000 HC REHAB R&B

## 2025-01-25 PROCEDURE — 97535 SELF CARE MNGMENT TRAINING: CPT

## 2025-01-25 PROCEDURE — 94640 AIRWAY INHALATION TREATMENT: CPT

## 2025-01-25 RX ORDER — GUAIFENESIN 600 MG/1
600 TABLET, EXTENDED RELEASE ORAL 2 TIMES DAILY
Status: DISCONTINUED | OUTPATIENT
Start: 2025-01-25 | End: 2025-01-26

## 2025-01-25 RX ADMIN — PREGABALIN 50 MG: 25 CAPSULE ORAL at 09:09

## 2025-01-25 RX ADMIN — HEPARIN SODIUM 5000 UNITS: 5000 INJECTION INTRAVENOUS; SUBCUTANEOUS at 15:06

## 2025-01-25 RX ADMIN — METHOCARBAMOL 750 MG: 750 TABLET ORAL at 09:09

## 2025-01-25 RX ADMIN — OXYCODONE HYDROCHLORIDE 10 MG: 5 TABLET ORAL at 03:13

## 2025-01-25 RX ADMIN — ACETAMINOPHEN 1000 MG: 500 TABLET ORAL at 15:06

## 2025-01-25 RX ADMIN — METOPROLOL TARTRATE 37.5 MG: 25 TABLET, FILM COATED ORAL at 09:08

## 2025-01-25 RX ADMIN — HEPARIN SODIUM 5000 UNITS: 5000 INJECTION INTRAVENOUS; SUBCUTANEOUS at 20:26

## 2025-01-25 RX ADMIN — FAMOTIDINE 20 MG: 20 TABLET, FILM COATED ORAL at 09:09

## 2025-01-25 RX ADMIN — FERROUS SULFATE TAB 325 MG (65 MG ELEMENTAL FE) 325 MG: 325 (65 FE) TAB at 17:15

## 2025-01-25 RX ADMIN — CLOPIDOGREL BISULFATE 75 MG: 75 TABLET ORAL at 09:09

## 2025-01-25 RX ADMIN — HEPARIN SODIUM 5000 UNITS: 5000 INJECTION INTRAVENOUS; SUBCUTANEOUS at 06:17

## 2025-01-25 RX ADMIN — ACETAMINOPHEN 1000 MG: 500 TABLET ORAL at 20:20

## 2025-01-25 RX ADMIN — ATORVASTATIN CALCIUM 40 MG: 40 TABLET, FILM COATED ORAL at 20:24

## 2025-01-25 RX ADMIN — METHOCARBAMOL 750 MG: 750 TABLET ORAL at 15:06

## 2025-01-25 RX ADMIN — METOPROLOL TARTRATE 37.5 MG: 25 TABLET, FILM COATED ORAL at 20:22

## 2025-01-25 RX ADMIN — OXYCODONE HYDROCHLORIDE 10 MG: 5 TABLET ORAL at 20:21

## 2025-01-25 RX ADMIN — ACETAMINOPHEN 1000 MG: 500 TABLET ORAL at 06:16

## 2025-01-25 RX ADMIN — ALBUTEROL SULFATE 2.5 MG: 2.5 SOLUTION RESPIRATORY (INHALATION) at 08:50

## 2025-01-25 RX ADMIN — OXYCODONE HYDROCHLORIDE 5 MG: 5 TABLET ORAL at 09:09

## 2025-01-25 RX ADMIN — BISACODYL 5 MG: 5 TABLET, COATED ORAL at 09:09

## 2025-01-25 RX ADMIN — FUROSEMIDE 20 MG: 20 TABLET ORAL at 09:09

## 2025-01-25 RX ADMIN — METHOCARBAMOL 750 MG: 750 TABLET ORAL at 20:24

## 2025-01-25 RX ADMIN — Medication 400 MG: at 09:09

## 2025-01-25 RX ADMIN — POTASSIUM CHLORIDE 10 MEQ: 750 TABLET, EXTENDED RELEASE ORAL at 09:09

## 2025-01-25 RX ADMIN — GUAIFENESIN 600 MG: 600 TABLET ORAL at 20:22

## 2025-01-25 RX ADMIN — GUAIFENESIN 600 MG: 600 TABLET ORAL at 15:06

## 2025-01-25 RX ADMIN — PREGABALIN 50 MG: 25 CAPSULE ORAL at 20:24

## 2025-01-25 RX ADMIN — ASPIRIN 81 MG: 81 TABLET, CHEWABLE ORAL at 09:09

## 2025-01-25 RX ADMIN — ALBUTEROL SULFATE 2.5 MG: 2.5 SOLUTION RESPIRATORY (INHALATION) at 19:29

## 2025-01-25 RX ADMIN — FERROUS SULFATE TAB 325 MG (65 MG ELEMENTAL FE) 325 MG: 325 (65 FE) TAB at 09:09

## 2025-01-25 ASSESSMENT — PAIN DESCRIPTION - DESCRIPTORS
DESCRIPTORS: ACHING
DESCRIPTORS: ACHING
DESCRIPTORS: SORE;ACHING

## 2025-01-25 ASSESSMENT — PAIN DESCRIPTION - LOCATION
LOCATION: CHEST
LOCATION: STERNUM
LOCATION: STERNUM

## 2025-01-25 ASSESSMENT — PAIN SCALES - GENERAL
PAINLEVEL_OUTOF10: 4
PAINLEVEL_OUTOF10: 7
PAINLEVEL_OUTOF10: 9

## 2025-01-25 ASSESSMENT — PAIN DESCRIPTION - FREQUENCY
FREQUENCY: INTERMITTENT
FREQUENCY: INTERMITTENT

## 2025-01-25 ASSESSMENT — PAIN DESCRIPTION - PAIN TYPE: TYPE: SURGICAL PAIN

## 2025-01-25 ASSESSMENT — PAIN DESCRIPTION - ORIENTATION: ORIENTATION: MID

## 2025-01-25 ASSESSMENT — PAIN DESCRIPTION - ONSET
ONSET: ON-GOING
ONSET: ON-GOING

## 2025-01-25 ASSESSMENT — PAIN - FUNCTIONAL ASSESSMENT: PAIN_FUNCTIONAL_ASSESSMENT: ACTIVITIES ARE NOT PREVENTED

## 2025-01-25 NOTE — PLAN OF CARE
Problem: Safety - Adult  Goal: Free from fall injury  Outcome: Progressing  Flowsheets (Taken 1/25/2025 1552)  Free From Fall Injury:   Instruct family/caregiver on patient safety   Based on caregiver fall risk screen, instruct family/caregiver to ask for assistance with transferring infant if caregiver noted to have fall risk factors     Problem: Pain  Goal: Verbalizes/displays adequate comfort level or baseline comfort level  Outcome: Progressing  Flowsheets (Taken 1/25/2025 1552)  Verbalizes/displays adequate comfort level or baseline comfort level:   Encourage patient to monitor pain and request assistance   Administer analgesics based on type and severity of pain and evaluate response   Consider cultural and social influences on pain and pain management   Assess pain using appropriate pain scale   Implement non-pharmacological measures as appropriate and evaluate response   Notify Licensed Independent Practitioner if interventions unsuccessful or patient reports new pain

## 2025-01-25 NOTE — PROGRESS NOTES
Department of Physical Medicine & Rehabilitation  Progress Note    Patient Identification:  Dawood Epps  [id number]  : 1955  Admit date: 2025    Chief Complaint: S/P CABG (coronary artery bypass graft)    Subjective:   Seen in his room this morning.  No new complaints overnight.  Patient is working well with therapy today.  No new pain overnight.  Patient still wants to get out of the hospital soon as possible.  Understands that he needs extensive therapy to be safe at home.  ROS: No f/c, n/v, cp     Objective:  Patient Vitals for the past 24 hrs:   BP Temp Temp src Pulse Resp SpO2 Weight   25 0907 116/60 97.9 °F (36.6 °C) Oral (!) 107 18 94 % --   25 0850 -- -- -- -- -- 96 % --   25 0736 (!) 148/81 -- -- 95 -- 95 % --   25 0615 -- -- -- -- -- -- 80.1 kg (176 lb 9.4 oz)   25 -- 98.1 °F (36.7 °C) -- 88 16 95 % --   25 (!) 111/59 98.1 °F (36.7 °C) Oral 88 -- -- --     Const: Alert. No distress, pleasant.   HEENT: Normocephalic, atraumatic. Normal sclera/conjunctiva. MMM.   CV: Regular rate and rhythm.   Resp: No respiratory distress. Lungs CTAB.   Abd: Soft, nontender, nondistended, NABS+   Ext: + edema.   Neuro: Alert, oriented, appropriately interactive.   Psych: Cooperative, appropriate mood and affect    Laboratory data: Available via EMR.   Last 24 hour lab  No results found for this or any previous visit (from the past 24 hour(s)).      Therapy progress:  PT  Position Activity Restriction  Sternal Precautions: 5# Lifting Restrictions, No Pushing, No Pulling  Objective     Sit to Stand: Contact guard assistance  Stand to Sit: Contact guard assistance  Bed to Chair: Contact guard assistance (no AD)  Device: No Device  Assistance: Contact guard assistance, Stand by assistance  Distance: 160 ft x2 + 220 ft (level surface) + 10 ft (uneven surface)  OT  PT Equipment Recommendations  Equipment Needed: No  Other: anticipate none, will CTA for need while in

## 2025-01-25 NOTE — PROGRESS NOTES
Physical Therapy  Facility/Department: [facility]  Rehabilitation Physical Therapy Treatment Note    NAME: Dawood Epps  : 1955 (69 y.o.)  MRN: [id number]  CODE STATUS: Full Code    Date of Service: 25     Restrictions:  Restrictions/Precautions: General Precautions, Cardiac  Position Activity Restriction  Sternal Precautions: 5# Lifting Restrictions, No Pushing, No Pulling     SUBJECTIVE  Subjective: Pt in bed on arrival, agreeable to therapy, RN cleared.     OBJECTIVE  Cognition  Overall Cognitive Status: WFL  Cognition Comment: recalls sternal precautions  Orientation  Overall Orientation Status: Within Normal Limits  Orientation Level: Oriented X4    Functional Mobility  Supine to Sit  Assistance Level: Minimal assistance  Skilled Clinical Factors: for trunk to sit up EOB  Balance  Sitting Balance: Independent  Standing Balance: Supervision    Environmental Mobility  Ambulation  Surface: Level surface  Distance: 200 + 150 ft., distance limited by fatigue  Additional Factors: Increased time to complete  Assistance Level: Stand by assist  Gait Deviations: Slow juan;Decreased arm swing bilateral;Decreased trunk rotation  Skilled Clinical Factors: No loss in balance this session, limited by endurance.    PT Exercises  Exercise Treatment: Seated LAQ with 4# ankle weights x 20 reps each for warm up.  Circulation/Endurance Exercises: 5x STS in 19.12 sec from low mat.  Dynamic Standing Balance Exercises: Standing balance exercises: marching in place with 4# ankle weights x 15 reps & CGA, standing on balance board shifting weight ant/post, R/L, alternating toe taps to 4\" step x 10 reps.  Exercise Equipment: SCI Fit for endurance trainin.5 resistance x 3 min (HR up to 128 bpm, symptomatic), extended seated rest and then tolerates 5 consecutive minutes without resistance.    ASSESSMENT/PROGRESS TOWARDS GOALS  Vital Signs  Pulse: 95  BP: (!) 148/81  BP Location: Left upper arm  MAP (Calculated): 103  SpO2:

## 2025-01-25 NOTE — PROGRESS NOTES
Occupational Therapy  Facility/Department: [facility]  Rehabilitation Occupational Therapy Daily Treatment Note    Date: 25  Patient Name: Dawood Epps       Room: 0160/0160-02  MRN: [id number]  Account: [id number]   : 1955  (69 y.o.) Gender: male                  Past Medical History:  has a past medical history of Aphasia, Asthma, Cerebrovascular disease, COPD (chronic obstructive pulmonary disease) (HCC), Hepatitis C, History of hepatitis C, Hyperlipidemia, Hypertension, MI (myocardial infarction) (HCC), Peripheral vascular disease (HCC), and Stroke (HCC).  Past Surgical History:   has a past surgical history that includes hernia repair (Bilateral, ); Carotid artery surgery (Bilateral, ); Abdominal aortic aneurysm repair (); Tonsillectomy and adenoidectomy; XR Cardiac Cath Procedure (2025); Cardiac procedure (N/A, 2025); and Coronary artery bypass graft (N/A, 2025).    Restrictions  Restrictions/Precautions: General Precautions, Cardiac  Required Braces or Orthoses?: No    Subjective  Subjective: pt in chair at OT arrival.  Restrictions/Precautions: General Precautions;Cardiac           Vitals:    25 0907   BP: 116/60   Pulse: (!) 107   Resp: 18   Temp:    SpO2: 94%     Objective             ADL  Tub/Shower Transfers  Type: Tub  Transfer From:  (no device)  Transfer To: Tub/Shower in standing position  Assistance Level: Contact guard assist  Skilled Clinical Factors: x5 trials with focus on reducing UE support on grab bar with CGA, no LOB, pt unsteady with single leg balance during step over          Functional Mobility  Activity: To/From therapy gym  Assistance Level: Stand by assist       Second Session:    ADL: toileting SPV -standing to void    Exercise:  Squat to stand with 2# weighted ball touch to ground and lift to chest height to imitate laundry basket off ground x 5 trials x 3 reps    20 reps x 3 exercises with 2# dumbbell: chest press, front shoulder raise,

## 2025-01-26 VITALS
WEIGHT: 176.81 LBS | SYSTOLIC BLOOD PRESSURE: 128 MMHG | BODY MASS INDEX: 23.43 KG/M2 | HEIGHT: 73 IN | DIASTOLIC BLOOD PRESSURE: 71 MMHG | OXYGEN SATURATION: 95 % | RESPIRATION RATE: 16 BRPM | HEART RATE: 102 BPM | TEMPERATURE: 97.3 F

## 2025-01-26 PROCEDURE — 6370000000 HC RX 637 (ALT 250 FOR IP): Performed by: STUDENT IN AN ORGANIZED HEALTH CARE EDUCATION/TRAINING PROGRAM

## 2025-01-26 PROCEDURE — 1280000000 HC REHAB R&B

## 2025-01-26 PROCEDURE — 6360000002 HC RX W HCPCS: Performed by: STUDENT IN AN ORGANIZED HEALTH CARE EDUCATION/TRAINING PROGRAM

## 2025-01-26 PROCEDURE — 94640 AIRWAY INHALATION TREATMENT: CPT

## 2025-01-26 RX ADMIN — METHOCARBAMOL 750 MG: 750 TABLET ORAL at 07:59

## 2025-01-26 RX ADMIN — ALBUTEROL SULFATE 2.5 MG: 2.5 SOLUTION RESPIRATORY (INHALATION) at 19:43

## 2025-01-26 RX ADMIN — ACETAMINOPHEN 1000 MG: 500 TABLET ORAL at 13:59

## 2025-01-26 RX ADMIN — ALBUTEROL SULFATE 2.5 MG: 2.5 SOLUTION RESPIRATORY (INHALATION) at 07:30

## 2025-01-26 RX ADMIN — CLOPIDOGREL BISULFATE 75 MG: 75 TABLET ORAL at 07:59

## 2025-01-26 RX ADMIN — PREGABALIN 50 MG: 25 CAPSULE ORAL at 21:50

## 2025-01-26 RX ADMIN — ATORVASTATIN CALCIUM 40 MG: 40 TABLET, FILM COATED ORAL at 21:50

## 2025-01-26 RX ADMIN — METHOCARBAMOL 750 MG: 750 TABLET ORAL at 21:49

## 2025-01-26 RX ADMIN — FERROUS SULFATE TAB 325 MG (65 MG ELEMENTAL FE) 325 MG: 325 (65 FE) TAB at 17:23

## 2025-01-26 RX ADMIN — BISACODYL 5 MG: 5 TABLET, COATED ORAL at 07:59

## 2025-01-26 RX ADMIN — METOPROLOL TARTRATE 37.5 MG: 25 TABLET, FILM COATED ORAL at 21:49

## 2025-01-26 RX ADMIN — FLUTICASONE PROPIONATE 2 SPRAY: 50 SPRAY, METERED NASAL at 12:47

## 2025-01-26 RX ADMIN — METHOCARBAMOL 750 MG: 750 TABLET ORAL at 17:23

## 2025-01-26 RX ADMIN — ASPIRIN 81 MG: 81 TABLET, CHEWABLE ORAL at 07:59

## 2025-01-26 RX ADMIN — ACETAMINOPHEN 1000 MG: 500 TABLET ORAL at 06:19

## 2025-01-26 RX ADMIN — FERROUS SULFATE TAB 325 MG (65 MG ELEMENTAL FE) 325 MG: 325 (65 FE) TAB at 07:59

## 2025-01-26 RX ADMIN — OXYCODONE HYDROCHLORIDE 10 MG: 5 TABLET ORAL at 06:18

## 2025-01-26 RX ADMIN — HEPARIN SODIUM 5000 UNITS: 5000 INJECTION INTRAVENOUS; SUBCUTANEOUS at 06:22

## 2025-01-26 RX ADMIN — OXYCODONE HYDROCHLORIDE 10 MG: 5 TABLET ORAL at 21:50

## 2025-01-26 RX ADMIN — METOPROLOL TARTRATE 37.5 MG: 25 TABLET, FILM COATED ORAL at 07:58

## 2025-01-26 RX ADMIN — METHOCARBAMOL 750 MG: 750 TABLET ORAL at 13:59

## 2025-01-26 RX ADMIN — HEPARIN SODIUM 5000 UNITS: 5000 INJECTION INTRAVENOUS; SUBCUTANEOUS at 14:00

## 2025-01-26 RX ADMIN — FAMOTIDINE 20 MG: 20 TABLET, FILM COATED ORAL at 07:59

## 2025-01-26 RX ADMIN — GUAIFENESIN 600 MG: 600 TABLET ORAL at 10:49

## 2025-01-26 RX ADMIN — HEPARIN SODIUM 5000 UNITS: 5000 INJECTION INTRAVENOUS; SUBCUTANEOUS at 21:50

## 2025-01-26 RX ADMIN — PREGABALIN 50 MG: 25 CAPSULE ORAL at 07:59

## 2025-01-26 ASSESSMENT — PAIN SCALES - GENERAL
PAINLEVEL_OUTOF10: 10
PAINLEVEL_OUTOF10: 7
PAINLEVEL_OUTOF10: 3

## 2025-01-26 ASSESSMENT — PAIN DESCRIPTION - LOCATION
LOCATION: GENERALIZED;SHOULDER
LOCATION: HEAD;STERNUM

## 2025-01-26 ASSESSMENT — PAIN DESCRIPTION - DESCRIPTORS: DESCRIPTORS: ACHING

## 2025-01-26 ASSESSMENT — PAIN - FUNCTIONAL ASSESSMENT: PAIN_FUNCTIONAL_ASSESSMENT: ACTIVITIES ARE NOT PREVENTED

## 2025-01-26 ASSESSMENT — PAIN DESCRIPTION - ORIENTATION: ORIENTATION: MID

## 2025-01-26 NOTE — PROGRESS NOTES
Department of Physical Medicine & Rehabilitation  Progress Note    Patient Identification:  Dawood Epps  [id number]  : 1955  Admit date: 2025    Chief Complaint: S/P CABG (coronary artery bypass graft)    Subjective:   Seen in his room with family this morning.  No new complaints overnight.  Patient is making progress in therapy daily.  No new pain this morning.  He continues to need to work on his endurance and cardiac rehab.  ROS: No f/c, n/v, cp     Objective:  Patient Vitals for the past 24 hrs:   BP Temp Temp src Pulse Resp SpO2 Weight   25 0756 123/65 98.2 °F (36.8 °C) Oral 92 18 94 % --   25 0731 -- -- -- -- -- 97 % --   25 0618 -- -- -- -- 16 -- --   25 0423 -- -- -- -- -- -- 80.2 kg (176 lb 12.9 oz)   25 119/72 97.9 °F (36.6 °C) Axillary 95 16 96 % --   25 1933 -- -- -- -- -- 96 % --     Const: Alert. No distress, pleasant.   HEENT: Normocephalic, atraumatic. Normal sclera/conjunctiva. MMM.   CV: Regular rate and rhythm.   Resp: No respiratory distress. Lungs CTAB.   Abd: Soft, nontender, nondistended, NABS+   Ext: + edema.   Neuro: Alert, oriented, appropriately interactive.   Psych: Cooperative, appropriate mood and affect    Laboratory data: Available via EMR.   Last 24 hour lab  No results found for this or any previous visit (from the past 24 hour(s)).      Therapy progress:  PT  Position Activity Restriction  Sternal Precautions: 5# Lifting Restrictions, No Pushing, No Pulling  Objective     Sit to Stand: Contact guard assistance  Stand to Sit: Contact guard assistance  Bed to Chair: Contact guard assistance (no AD)  Device: No Device  Assistance: Contact guard assistance, Stand by assistance  Distance: 160 ft x2 + 220 ft (level surface) + 10 ft (uneven surface)  OT  PT Equipment Recommendations  Equipment Needed: No  Other: anticipate none, will CTA for need while in ARU  Toilet - Technique: Ambulating (no device)  Assessment        SLP       Thrombocytopenia

## 2025-01-26 NOTE — PLAN OF CARE
Problem: Safety - Adult  Goal: Free from fall injury  Outcome: Progressing  Flowsheets (Taken 1/26/2025 1818)  Free From Fall Injury:   Instruct family/caregiver on patient safety   Based on caregiver fall risk screen, instruct family/caregiver to ask for assistance with transferring infant if caregiver noted to have fall risk factors     Problem: Pain  Goal: Verbalizes/displays adequate comfort level or baseline comfort level  Outcome: Progressing  Flowsheets (Taken 1/26/2025 1818)  Verbalizes/displays adequate comfort level or baseline comfort level:   Encourage patient to monitor pain and request assistance   Administer analgesics based on type and severity of pain and evaluate response   Consider cultural and social influences on pain and pain management   Assess pain using appropriate pain scale   Implement non-pharmacological measures as appropriate and evaluate response   Notify Licensed Independent Practitioner if interventions unsuccessful or patient reports new pain

## 2025-01-27 LAB
ANION GAP SERPL CALCULATED.3IONS-SCNC: 10 MMOL/L (ref 3–16)
BASOPHILS # BLD: 0.1 K/UL (ref 0–0.2)
BASOPHILS NFR BLD: 0.8 %
BUN SERPL-MCNC: 18 MG/DL (ref 7–20)
CALCIUM SERPL-MCNC: 10.5 MG/DL (ref 8.3–10.6)
CHLORIDE SERPL-SCNC: 102 MMOL/L (ref 99–110)
CO2 SERPL-SCNC: 27 MMOL/L (ref 21–32)
CREAT SERPL-MCNC: 1.3 MG/DL (ref 0.8–1.3)
DEPRECATED RDW RBC AUTO: 16.9 % (ref 12.4–15.4)
EOSINOPHIL # BLD: 0.5 K/UL (ref 0–0.6)
EOSINOPHIL NFR BLD: 5.5 %
GFR SERPLBLD CREATININE-BSD FMLA CKD-EPI: 59 ML/MIN/{1.73_M2}
GLUCOSE SERPL-MCNC: 101 MG/DL (ref 70–99)
HCT VFR BLD AUTO: 28.2 % (ref 40.5–52.5)
HGB BLD-MCNC: 9.3 G/DL (ref 13.5–17.5)
INR PPP: 0.95 (ref 0.85–1.15)
LYMPHOCYTES # BLD: 1.8 K/UL (ref 1–5.1)
LYMPHOCYTES NFR BLD: 21.8 %
MCH RBC QN AUTO: 30.1 PG (ref 26–34)
MCHC RBC AUTO-ENTMCNC: 33 G/DL (ref 31–36)
MCV RBC AUTO: 91.3 FL (ref 80–100)
MONOCYTES # BLD: 0.9 K/UL (ref 0–1.3)
MONOCYTES NFR BLD: 10.5 %
NEUTROPHILS # BLD: 5.1 K/UL (ref 1.7–7.7)
NEUTROPHILS NFR BLD: 61.4 %
PLATELET # BLD AUTO: 633 K/UL (ref 135–450)
PMV BLD AUTO: 7.7 FL (ref 5–10.5)
POTASSIUM SERPL-SCNC: 4.4 MMOL/L (ref 3.5–5.1)
PROTHROMBIN TIME: 12.9 SEC (ref 11.9–14.9)
RBC # BLD AUTO: 3.09 M/UL (ref 4.2–5.9)
SODIUM SERPL-SCNC: 139 MMOL/L (ref 136–145)
WBC # BLD AUTO: 8.3 K/UL (ref 4–11)

## 2025-01-27 PROCEDURE — 6360000002 HC RX W HCPCS: Performed by: STUDENT IN AN ORGANIZED HEALTH CARE EDUCATION/TRAINING PROGRAM

## 2025-01-27 PROCEDURE — 0W9B3ZZ DRAINAGE OF LEFT PLEURAL CAVITY, PERCUTANEOUS APPROACH: ICD-10-PCS | Performed by: RADIOLOGY

## 2025-01-27 PROCEDURE — 80048 BASIC METABOLIC PNL TOTAL CA: CPT

## 2025-01-27 PROCEDURE — 97110 THERAPEUTIC EXERCISES: CPT

## 2025-01-27 PROCEDURE — 6370000000 HC RX 637 (ALT 250 FOR IP)

## 2025-01-27 PROCEDURE — 97530 THERAPEUTIC ACTIVITIES: CPT

## 2025-01-27 PROCEDURE — 6370000000 HC RX 637 (ALT 250 FOR IP): Performed by: THORACIC SURGERY (CARDIOTHORACIC VASCULAR SURGERY)

## 2025-01-27 PROCEDURE — 97116 GAIT TRAINING THERAPY: CPT

## 2025-01-27 PROCEDURE — 85025 COMPLETE CBC W/AUTO DIFF WBC: CPT

## 2025-01-27 PROCEDURE — 85610 PROTHROMBIN TIME: CPT

## 2025-01-27 PROCEDURE — 6370000000 HC RX 637 (ALT 250 FOR IP): Performed by: STUDENT IN AN ORGANIZED HEALTH CARE EDUCATION/TRAINING PROGRAM

## 2025-01-27 PROCEDURE — 99024 POSTOP FOLLOW-UP VISIT: CPT

## 2025-01-27 PROCEDURE — 36415 COLL VENOUS BLD VENIPUNCTURE: CPT

## 2025-01-27 PROCEDURE — 94640 AIRWAY INHALATION TREATMENT: CPT

## 2025-01-27 PROCEDURE — 1280000000 HC REHAB R&B

## 2025-01-27 PROCEDURE — 97535 SELF CARE MNGMENT TRAINING: CPT

## 2025-01-27 RX ORDER — METOPROLOL TARTRATE 50 MG
50 TABLET ORAL 2 TIMES DAILY
Status: DISCONTINUED | OUTPATIENT
Start: 2025-01-27 | End: 2025-01-29 | Stop reason: HOSPADM

## 2025-01-27 RX ORDER — FUROSEMIDE 40 MG/1
40 TABLET ORAL DAILY
Status: DISCONTINUED | OUTPATIENT
Start: 2025-01-27 | End: 2025-01-29 | Stop reason: HOSPADM

## 2025-01-27 RX ORDER — ACETYLCYSTEINE 200 MG/ML
600 SOLUTION ORAL; RESPIRATORY (INHALATION) 2 TIMES DAILY PRN
Status: DISCONTINUED | OUTPATIENT
Start: 2025-01-27 | End: 2025-01-29 | Stop reason: HOSPADM

## 2025-01-27 RX ADMIN — ALBUTEROL SULFATE 2.5 MG: 2.5 SOLUTION RESPIRATORY (INHALATION) at 08:15

## 2025-01-27 RX ADMIN — CLOPIDOGREL BISULFATE 75 MG: 75 TABLET ORAL at 08:28

## 2025-01-27 RX ADMIN — HEPARIN SODIUM 5000 UNITS: 5000 INJECTION INTRAVENOUS; SUBCUTANEOUS at 13:32

## 2025-01-27 RX ADMIN — FERROUS SULFATE TAB 325 MG (65 MG ELEMENTAL FE) 325 MG: 325 (65 FE) TAB at 17:06

## 2025-01-27 RX ADMIN — FERROUS SULFATE TAB 325 MG (65 MG ELEMENTAL FE) 325 MG: 325 (65 FE) TAB at 08:29

## 2025-01-27 RX ADMIN — ALBUTEROL SULFATE 2.5 MG: 2.5 SOLUTION RESPIRATORY (INHALATION) at 20:05

## 2025-01-27 RX ADMIN — FLUTICASONE PROPIONATE 2 SPRAY: 50 SPRAY, METERED NASAL at 15:20

## 2025-01-27 RX ADMIN — METHOCARBAMOL 750 MG: 750 TABLET ORAL at 21:41

## 2025-01-27 RX ADMIN — FUROSEMIDE 40 MG: 40 TABLET ORAL at 10:10

## 2025-01-27 RX ADMIN — PREGABALIN 50 MG: 25 CAPSULE ORAL at 08:29

## 2025-01-27 RX ADMIN — METOPROLOL TARTRATE 50 MG: 50 TABLET, FILM COATED ORAL at 21:41

## 2025-01-27 RX ADMIN — OXYCODONE HYDROCHLORIDE 10 MG: 5 TABLET ORAL at 08:29

## 2025-01-27 RX ADMIN — METOPROLOL TARTRATE 37.5 MG: 25 TABLET, FILM COATED ORAL at 08:28

## 2025-01-27 RX ADMIN — METHOCARBAMOL 750 MG: 750 TABLET ORAL at 13:33

## 2025-01-27 RX ADMIN — PREGABALIN 50 MG: 25 CAPSULE ORAL at 21:41

## 2025-01-27 RX ADMIN — OXYCODONE HYDROCHLORIDE 10 MG: 5 TABLET ORAL at 21:41

## 2025-01-27 RX ADMIN — OXYCODONE HYDROCHLORIDE 10 MG: 5 TABLET ORAL at 17:08

## 2025-01-27 RX ADMIN — METHOCARBAMOL 750 MG: 750 TABLET ORAL at 08:28

## 2025-01-27 RX ADMIN — ASPIRIN 81 MG: 81 TABLET, CHEWABLE ORAL at 08:28

## 2025-01-27 RX ADMIN — HEPARIN SODIUM 5000 UNITS: 5000 INJECTION INTRAVENOUS; SUBCUTANEOUS at 21:41

## 2025-01-27 RX ADMIN — METHOCARBAMOL 750 MG: 750 TABLET ORAL at 17:06

## 2025-01-27 RX ADMIN — Medication 5 MG: at 21:41

## 2025-01-27 RX ADMIN — FAMOTIDINE 20 MG: 20 TABLET, FILM COATED ORAL at 08:29

## 2025-01-27 RX ADMIN — ATORVASTATIN CALCIUM 40 MG: 40 TABLET, FILM COATED ORAL at 21:41

## 2025-01-27 RX ADMIN — HEPARIN SODIUM 5000 UNITS: 5000 INJECTION INTRAVENOUS; SUBCUTANEOUS at 05:02

## 2025-01-27 ASSESSMENT — PAIN DESCRIPTION - LOCATION
LOCATION: SHOULDER
LOCATION: GENERALIZED
LOCATION: GENERALIZED

## 2025-01-27 ASSESSMENT — PAIN SCALES - WONG BAKER: WONGBAKER_NUMERICALRESPONSE: NO HURT

## 2025-01-27 ASSESSMENT — PAIN SCALES - GENERAL
PAINLEVEL_OUTOF10: 7

## 2025-01-27 ASSESSMENT — PAIN DESCRIPTION - DESCRIPTORS
DESCRIPTORS: ACHING;SORE
DESCRIPTORS: ACHING;SORE

## 2025-01-27 NOTE — CARE COORDINATION
Clinicals faxed to insurance for .    Amos Cabral MPH, RRT  ARU Admissions Supervisor-[facility]  (P)[phone]  (F)[phone]   Electronically signed by Amos Cabral on 1/27/2025 at 10:01 AM

## 2025-01-27 NOTE — PROGRESS NOTES
CVTS Thoracic Progress Note:          CC:  Post op follow up    HPI:  Mr. Epps is a 69 y.o. male, known to service who underwent CABG x 4 on 1/14/24. His post operative course was prolonged due to continued SOB,tachycardia, ambulatory status and pulmonary expansion.  Post operatively he required frequent encouragement and re-education on participating in post op therapies.  Prior to discharge to ARU his CXR had showed stable, but persistent atelectasis vs small pleural effusion.  Clinically, this was felt to likely be related to post op atelectasis, but decision was made to D/C to ARU on Lasix PO x 4 days and F/U CXR on 1/24 to re-evaluate.    Subj: Denies current c/o .     Obj:    Blood pressure (!) 140/78, pulse 79, temperature 97.7 °F (36.5 °C), temperature source Oral, resp. rate 18, height 1.854 m (6' 0.99\"), weight 80.2 kg (176 lb 12.9 oz), SpO2 98%.    Alert, oriented   S1 S2 normal. SR on monitor  Lungs CTAB   Abdomen rounded, soft. normoactive bowel sounds      Diagnostics:   CBC with Differential:    Lab Results   Component Value Date/Time    WBC 8.3 01/27/2025 05:53 AM    RBC 3.09 01/27/2025 05:53 AM    HGB 9.3 01/27/2025 05:53 AM    HCT 28.2 01/27/2025 05:53 AM     01/27/2025 05:53 AM    MCV 91.3 01/27/2025 05:53 AM    MCH 30.1 01/27/2025 05:53 AM    MCHC 33.0 01/27/2025 05:53 AM    RDW 16.9 01/27/2025 05:53 AM    BANDSPCT 3 02/14/2024 07:02 AM    LYMPHOPCT 21.8 01/27/2025 05:53 AM    MONOPCT 10.5 01/27/2025 05:53 AM    EOSPCT 5.5 01/27/2025 05:53 AM    BASOPCT 0.8 01/27/2025 05:53 AM    MONOSABS 0.9 01/27/2025 05:53 AM    LYMPHSABS 1.8 01/27/2025 05:53 AM    EOSABS 0.5 01/27/2025 05:53 AM    BASOSABS 0.1 01/27/2025 05:53 AM     CMP:    Lab Results   Component Value Date/Time     01/27/2025 05:53 AM    K 4.4 01/27/2025 05:53 AM     01/27/2025 05:53 AM    CO2 27 01/27/2025 05:53 AM    BUN 18 01/27/2025 05:53 AM    CREATININE 1.3 01/27/2025 05:53 AM    GFRAA >60 06/24/2018 03:08 AM

## 2025-01-27 NOTE — PROGRESS NOTES
Department of Physical Medicine & Rehabilitation  Progress Note    Patient Identification:  Dawood Epps  [id number]  : 1955  Admit date: 2025    Chief Complaint: S/P CABG (coronary artery bypass graft)    Subjective:   Seen in his room this morning.  No new complaints overnight.  Patient feels like he is doing well in therapy and wants to get out of the hospital ASAP.  Peer to peer was required to cover the last few days on rehab unit.  The patient continues to work hard with therapy.  ROS: No f/c, n/v, cp     Objective:  Patient Vitals for the past 24 hrs:   BP Temp Temp src Pulse Resp SpO2 Weight   25 1237 111/76 -- -- 94 -- 98 % --   25 0915 -- -- -- -- -- -- 79.4 kg (175 lb)   25 0859 -- -- -- -- 18 -- --   25 0826 (!) 140/78 97.7 °F (36.5 °C) Oral 79 18 98 % --   25 0818 -- -- -- -- -- 94 % --   25 2145 128/71 97.3 °F (36.3 °C) Oral (!) 102 16 -- --   25 1945 -- -- -- 94 18 95 % --     Const: Alert. No distress, pleasant.   HEENT: Normocephalic, atraumatic. Normal sclera/conjunctiva. MMM.   CV: Regular rate and rhythm.   Resp: No respiratory distress. Lungs CTAB.   Abd: Soft, nontender, nondistended, NABS+   Ext: + edema.   Neuro: Alert, oriented, appropriately interactive.   Psych: Cooperative, appropriate mood and affect    Laboratory data: Available via EMR.   Last 24 hour lab  Recent Results (from the past 24 hour(s))   Basic Metabolic Panel w/ Reflex to MG    Collection Time: 25  5:53 AM   Result Value Ref Range    Sodium 139 136 - 145 mmol/L    Potassium reflex Magnesium 4.4 3.5 - 5.1 mmol/L    Chloride 102 99 - 110 mmol/L    CO2 27 21 - 32 mmol/L    Anion Gap 10 3 - 16    Glucose 101 (H) 70 - 99 mg/dL    BUN 18 7 - 20 mg/dL    Creatinine 1.3 0.8 - 1.3 mg/dL    Est, Glom Filt Rate 59 (A) >60    Calcium 10.5 8.3 - 10.6 mg/dL   CBC with Auto Differential    Collection Time: 25  5:53 AM   Result Value Ref Range    WBC 8.3 4.0 - 11.0 K/uL

## 2025-01-27 NOTE — CARE COORDINATION
CM update: Spoke with patient and wife Anika in room to discuss discharge planning. Patient and wife are aware that discharge is planned for Saturday 2/1 with a recommendation of [facility]. Patient and wife are agreeable and have no agency preference. Patient and wife have no questions/concerns at this time r/t discharge planning. Will continue to follow.     Referral made and accepted through [facility]    Cornelia Grover RN

## 2025-01-27 NOTE — PLAN OF CARE
Problem: Safety - Adult  Goal: Free from fall injury  1/27/2025 0927 by Peewee Camacho RN  Outcome: Progressing  1/26/2025 2254 by Cate Lin RN  Outcome: Progressing     Problem: Pain  Goal: Verbalizes/displays adequate comfort level or baseline comfort level  1/27/2025 0927 by Peewee Camacho, RN  Outcome: Progressing  1/26/2025 2254 by Cate Lin, RN  Outcome: Progressing

## 2025-01-27 NOTE — PROGRESS NOTES
Physical Therapy  Facility/Department: [facility]  Rehabilitation Physical Therapy Treatment Note    NAME: Dawood Epps  : 1955 (69 y.o.)  MRN: [id number]  CODE STATUS: Full Code    Date of Service: 25       Restrictions:  Restrictions/Precautions: General Precautions, Cardiac  Position Activity Restriction  Sternal Precautions: 5# Lifting Restrictions, No Pushing, No Pulling     SUBJECTIVE  Subjective: pt found in recliner  Pain: denies pain       OBJECTIVE  Cognition  Overall Cognitive Status: WFL  Arousal/Alertness: Appears intact  Following Commands: Follows multistep commands consistently;Follows multistep commands with increased time  Attention Span: Difficulty dividing attention  Memory: Appears intact  Safety Judgement: Decreased awareness of need for assistance  Problem Solving: Assistance required to generate solutions;Assistance required to implement solutions  Insights: Decreased awareness of deficits  Initiation: Requires cues for some  Sequencing: Requires cues for some  Cognition Comment: recalls sternal precautions  Orientation  Overall Orientation Status: Within Normal Limits  Orientation Level: Oriented X4    Functional Mobility  Sit to Stand  Assistance Level: Supervision  Stand to Sit  Assistance Level: Supervision      Environmental Mobility  Ambulation  Surface: Level surface  Device:  (no AD)  Distance: 175 ft x 2 reps  Assistance Level: Supervision  Gait Deviations: Slow juan;Decreased arm swing bilateral;Decreased trunk rotation      PT Exercises  Circulation/Endurance Exercises: pt completed 7 min on SCIFIT level 1 with BUE and BLE for increased CV endurance and LE strength.maintains rate of 90-95  steps/min    Pt in recliner at end of session with call light/needs within reach  ASSESSMENT/PROGRESS TOWARDS GOALS  Vital Signs  Pulse: 94  BP: 111/76  MAP (Calculated): 88  SpO2: 98 %    Assessment  Assessment: pt found sleeping in recliner, once awake pleasant and agreeable to

## 2025-01-27 NOTE — PROGRESS NOTES
Handout provided.       X 10 STS from recliner, good adherence to sternal px; mod I     Assessment  Assessment  Assessment: Pt tolerates session well. Pt continues to be limited by ax tolerance, though has improved since admission. Pt's HR up to 121 during IADL ax. Pt requires min verbal cues for adherence to sternal px during fxl sit <> stands. Pt progresses to mod I for grooming tasks, toileting, and UB dressing this date. Pt declines shower during session. Pt verbalizes and demo's understanding of BUE ex HEP. Cont OT POC.   Activity Tolerance: Patient tolerated treatment well  Discharge Recommendations: 24 hour supervision or assist  OT Equipment Recommendations  Equipment Needed: Yes  ADL Assistive Devices: Shower Chair with back;Transfer Tub Bench (CTA pending mobility and balance and safety)  Safety Devices  Safety Devices in place: Yes  Type of devices: Nurse notified;Left in chair;Chair alarm in place;Call light within reach;All fall risk precautions in place;Gait belt;Patient at risk for falls    Patient Education  Education  Education Given To: Patient  Education Provided: Role of Therapy;Plan of Care;Safety;ADL Function;Energy Conservation;Fall Prevention Strategies;Precautions;Transfer Training  Education Provided Comments: role of OT, POC, performance of ADLs, ECON, BUE ex, therapy progress  Education Method: Verbal  Barriers to Learning: None  Education Outcome: Continued education needed;Verbalized understanding    Plan  Occupational Therapy Plan  Times Per Week: 5-7x/wk  Current Treatment Recommendations: Balance training;Functional mobility training;Endurance training;Safety education & training;Equipment evaluation, education, & procurement;Self-Care / ADL;Patient/Caregiver education & training;Home management training    Goals  Patient Goals   Patient goals : \"Be able to do my normal things and go home\"  Short Term Goals  Time Frame for Short Term Goals: 7 days (1/27)  Short Term Goal 1: Perform

## 2025-01-27 NOTE — PLAN OF CARE
Problem: Safety - Adult  Goal: Free from fall injury  1/26/2025 1818 by Cortney Crane, RN  Outcome: Progressing  Flowsheets (Taken 1/26/2025 1818)  Free From Fall Injury:   Instruct family/caregiver on patient safety   Based on caregiver fall risk screen, instruct family/caregiver to ask for assistance with transferring infant if caregiver noted to have fall risk factors     Problem: Pain  Goal: Verbalizes/displays adequate comfort level or baseline comfort level  1/26/2025 2259 by Cate Lin, RN  Outcome: Progressing

## 2025-01-27 NOTE — PROGRESS NOTES
Physical Therapy  Facility/Department: [facility]  Rehabilitation Physical Therapy Treatment Note    NAME: Dawood Epps  : 1955 (69 y.o.)  MRN: [id number]  CODE STATUS: Full Code    Date of Service: 25       Restrictions:  Restrictions/Precautions: General Precautions, Cardiac  Position Activity Restriction  Sternal Precautions: 5# Lifting Restrictions, No Pushing, No Pulling     SUBJECTIVE  Subjective: Pt supine in bed on approach, agreeable to PT tx, RN reports plan for thoracentesis per CT sx  Pain: Pt denies c/o pain       OBJECTIVE  Cognition  Overall Cognitive Status: WFL  Arousal/Alertness: Appears intact  Following Commands: Follows multistep commands consistently;Follows multistep commands with increased time  Attention Span: Difficulty dividing attention  Memory: Appears intact  Safety Judgement: Decreased awareness of need for assistance  Problem Solving: Assistance required to generate solutions;Assistance required to implement solutions  Insights: Decreased awareness of deficits  Initiation: Requires cues for some  Sequencing: Requires cues for some  Cognition Comment: recalls sternal precautions  Orientation  Overall Orientation Status: Within Normal Limits  Orientation Level: Oriented X4    Functional Mobility  Supine to Sit  Assistance Level: Stand by assist  Skilled Clinical Factors: HOB slightly elevated    Balance  Sitting Balance: Independent  Standing Balance: Supervision  Standing Balance  Activity: Sup no AD    Transfers  Surface: From bed;From chair with arms  Additional Factors: Verbal cues;Hand placement cues;Increased time to complete (min cues for sternal px)  Sit to Stand  Assistance Level: Supervision  Skilled Clinical Factors: multiple reps no AD  Stand to Sit  Assistance Level: Supervision  Skilled Clinical Factors: multiple reps no AD      Environmental Mobility  Ambulation  Surface: Level surface  Device:  (no AD)  Distance: 20' x 2 + 150' x 2  Activity: Within Room;Within

## 2025-01-27 NOTE — PROGRESS NOTES
Comprehensive Nutrition Assessment    Type and Reason for Visit:  Reassess    Nutrition Recommendations/Plan:   Continue regular diet   Encourage po intakes  Continue Ensure daily  Monitor po intakes, nutrition adequacy, weights, pertinent labs, BMs     Malnutrition Assessment:  Malnutrition Status:  No malnutrition (01/22/25 1209)      Nutrition Assessment:    Follow up: Pt remains nutritionally compromised AEB variable po intakes per EMR. Pt out of room at time of visit today. Currently on a regular diet with Ensure ordered daily. PO intakes 1-100% per EMR. Drinking some of his ONS per EMR. Will continue current diet and ONS and monitor.    Nutrition Related Findings:    BM x2 on 1/25. No edema. Wound Type: Surgical Incision       Current Nutrition Intake & Therapies:    Average Meal Intake: 1-25%, 26-50%, 51-75%, %  Average Supplements Intake: %  ADULT DIET; Regular  ADULT ORAL NUTRITION SUPPLEMENT; Breakfast; Standard High Calorie/High Protein Oral Supplement    Anthropometric Measures:  Height: 185.4 cm (6' 0.99\")  Ideal Body Weight (IBW): 184 lbs (84 kg)       Current Body Weight: 79.4 kg (175 lb),   IBW. Weight Source: Standing scale  Current BMI (kg/m2): 23.1                                  Estimated Daily Nutrient Needs:  Energy Requirements Based On: Kcal/kg  Weight Used for Energy Requirements: Current  Energy (kcal/day): 2308-7490 (25-28 kcal/kg CBW)  Weight Used for Protein Requirements: Current  Protein (g/day):  (1.2-1.4 gm/kg CBW)  Method Used for Fluid Requirements: 1 ml/kcal  Fluid (ml/day): 2000 ml    Nutrition Diagnosis:   Increased nutrient needs related to catabolic illness as evidenced by s/p surgery    Nutrition Interventions:   Food and/or Nutrient Delivery: Continue Current Diet, Continue Oral Nutrition Supplement  Nutrition Education/Counseling: No recommendation at this time  Coordination of Nutrition Care: Continue to monitor while inpatient       Goals:  Goals: PO

## 2025-01-27 NOTE — DISCHARGE INSTR - COC
Continuity of Care Form    Patient Name: Dawood Epps   :  1955  MRN:  [id number]    Admit date:  2025  Discharge date:  25    Code Status Order: Full Code   Advance Directives:   Advance Care Flowsheet Documentation             Admitting Physician:  Claudia Yap MD  PCP: Suha uRff MD    Discharging Nurse: Cortney Dorsey Hospital Unit/Room#: 0160/0160-02  Discharging Unit Phone Number: [phone]    Emergency Contact:   Extended Emergency Contact Information  Primary Emergency Contact: Anika Epps  Address: [address] [place] [address]  Home Phone: [phone]  Relation: Spouse    Past Surgical History:  Past Surgical History:   Procedure Laterality Date    ABDOMINAL AORTIC ANEURYSM REPAIR      CARDIAC CATH PROCEDURE  2025    CARDIAC PROCEDURE N/A 2025    Left heart cath / coronary angiography performed by Quiana Vidal MD at [facility] CARDIAC CATH LAB    CAROTID ARTERY SURGERY Bilateral 2018    CORONARY ARTERY BYPASS GRAFT N/A 2025    CORONARY ARTERY BYPASS GRAFT TIMES FOUR, CARDIOPULOMARY BYPASS, ENDOSCOPIC SAPHENOUS VEIN GRAFT HARVEST, POSTERIOR PERICARDIOTOMY, ULTRASOUND INTERROGATION OF GRAFTS, LEFT ATRIAL APPENDAGE CLIP PLACEMENT, TRANSESOPHAGEAL ECHOCARDIOGRAM, BILATERAL PECTORALIS BLOCKS performed by Esthela Gilbert MD at [facility] CVOR    HERNIA REPAIR Bilateral 2017    bilateral inguinal hernia repair    TONSILLECTOMY AND ADENOIDECTOMY         Immunization History:   Immunization History   Administered Date(s) Administered    COVID-19, MODERNA, (age 12y+), IM, 50mcg/0.5mL 09/15/2024    COVID-19, PFIZER Bivalent, DO NOT Dilute, (age 12y+), IM, 30 mcg/0.3 mL 2022    COVID-19, PFIZER PURPLE top, DILUTE for use, (age 12 y+), 30mcg/0.3mL 2021, 2021, 2021    Pneumococcal, PCV20, PREVNAR 20, (age 6w+), IM, 0.5mL 2023    RSV (Respiratory Syncytial Virus), mAB Unspecified 2024    TDaP, ADACEL (age 10y-64y),

## 2025-01-27 NOTE — RT PROTOCOL NOTE
RT Inhaler-Nebulizer Bronchodilator Protocol Note    There is a bronchodilator order in the chart from a provider indicating to follow the RT Bronchodilator Protocol and there is an “Initiate RT Inhaler-Nebulizer Bronchodilator Protocol” order as well (see protocol at bottom of note).    CXR Findings:  No results found.    The findings from the last RT Protocol Assessment were as follows:   History Pulmonary Disease: Smoker 15 pack years or more  Respiratory Pattern: Dyspnea on exertion or RR 21-25 bpm  Breath Sounds: Slightly diminished and/or crackles  Cough: Strong, productive  Indication for Bronchodilator Therapy: On home bronchodilators  Bronchodilator Assessment Score: 6    Aerosolized bronchodilator medication orders have been revised according to the RT Inhaler-Nebulizer Bronchodilator Protocol below.    Respiratory Therapist to perform RT Therapy Protocol Assessment initially then follow the protocol.  Repeat RT Therapy Protocol Assessment PRN for score 0-3 or on second treatment, BID, and PRN for scores above 3.    No Indications - adjust the frequency to every 6 hours PRN wheezing or bronchospasm, if no treatments needed after 48 hours then discontinue using Per Protocol order mode.     If indication present, adjust the RT bronchodilator orders based on the Bronchodilator Assessment Score as indicated below.  Use Inhaler orders unless patient has one or more of the following: on home nebulizer, not able to hold breath for 10 seconds, is not alert and oriented, cannot activate and use MDI correctly, or respiratory rate 25 breaths per minute or more, then use the equivalent nebulizer order(s) with same Frequency and PRN reasons based on the score.  If a patient is on this medication at home then do not decrease Frequency below that used at home.    0-3 - enter or revise RT bronchodilator order(s) to equivalent RT Bronchodilator order with Frequency of every 4 hours PRN for wheezing or increased work of

## 2025-01-28 ENCOUNTER — APPOINTMENT (OUTPATIENT)
Dept: ULTRASOUND IMAGING | Age: 70
DRG: 949 | End: 2025-01-28
Attending: STUDENT IN AN ORGANIZED HEALTH CARE EDUCATION/TRAINING PROGRAM
Payer: COMMERCIAL

## 2025-01-28 ENCOUNTER — APPOINTMENT (OUTPATIENT)
Dept: GENERAL RADIOLOGY | Age: 70
DRG: 949 | End: 2025-01-28
Attending: STUDENT IN AN ORGANIZED HEALTH CARE EDUCATION/TRAINING PROGRAM
Payer: COMMERCIAL

## 2025-01-28 PROCEDURE — 6370000000 HC RX 637 (ALT 250 FOR IP): Performed by: STUDENT IN AN ORGANIZED HEALTH CARE EDUCATION/TRAINING PROGRAM

## 2025-01-28 PROCEDURE — 71045 X-RAY EXAM CHEST 1 VIEW: CPT

## 2025-01-28 PROCEDURE — 1280000000 HC REHAB R&B

## 2025-01-28 PROCEDURE — 97530 THERAPEUTIC ACTIVITIES: CPT

## 2025-01-28 PROCEDURE — 6360000002 HC RX W HCPCS: Performed by: STUDENT IN AN ORGANIZED HEALTH CARE EDUCATION/TRAINING PROGRAM

## 2025-01-28 PROCEDURE — 94640 AIRWAY INHALATION TREATMENT: CPT

## 2025-01-28 PROCEDURE — 97116 GAIT TRAINING THERAPY: CPT

## 2025-01-28 PROCEDURE — 6370000000 HC RX 637 (ALT 250 FOR IP): Performed by: PHYSICAL MEDICINE & REHABILITATION

## 2025-01-28 PROCEDURE — 97110 THERAPEUTIC EXERCISES: CPT

## 2025-01-28 PROCEDURE — 97535 SELF CARE MNGMENT TRAINING: CPT

## 2025-01-28 PROCEDURE — 6370000000 HC RX 637 (ALT 250 FOR IP): Performed by: THORACIC SURGERY (CARDIOTHORACIC VASCULAR SURGERY)

## 2025-01-28 PROCEDURE — 6370000000 HC RX 637 (ALT 250 FOR IP)

## 2025-01-28 PROCEDURE — 32555 ASPIRATE PLEURA W/ IMAGING: CPT

## 2025-01-28 RX ORDER — METHOCARBAMOL 750 MG/1
750 TABLET, FILM COATED ORAL 4 TIMES DAILY
Qty: 40 TABLET | Refills: 0 | Status: SHIPPED | OUTPATIENT
Start: 2025-01-28 | End: 2025-02-07

## 2025-01-28 RX ORDER — FUROSEMIDE 40 MG/1
40 TABLET ORAL DAILY
Qty: 60 TABLET | Refills: 3 | Status: SHIPPED | OUTPATIENT
Start: 2025-01-29

## 2025-01-28 RX ORDER — HYDROXYZINE HYDROCHLORIDE 10 MG/1
10 TABLET, FILM COATED ORAL 3 TIMES DAILY PRN
Qty: 60 TABLET | Refills: 1 | Status: SHIPPED | OUTPATIENT
Start: 2025-01-28 | End: 2025-03-09

## 2025-01-28 RX ORDER — POLYETHYLENE GLYCOL 3350 17 G/17G
17 POWDER, FOR SOLUTION ORAL DAILY
Qty: 527 G | Refills: 1 | Status: SHIPPED | OUTPATIENT
Start: 2025-01-29 | End: 2025-04-01

## 2025-01-28 RX ORDER — PREGABALIN 50 MG/1
50 CAPSULE ORAL 2 TIMES DAILY
Qty: 60 CAPSULE | Refills: 1 | Status: SHIPPED | OUTPATIENT
Start: 2025-01-28 | End: 2025-03-29

## 2025-01-28 RX ORDER — ASPIRIN 81 MG/1
81 TABLET, CHEWABLE ORAL DAILY
Qty: 30 TABLET | Refills: 3 | Status: SHIPPED | OUTPATIENT
Start: 2025-01-28

## 2025-01-28 RX ORDER — CLOPIDOGREL BISULFATE 75 MG/1
75 TABLET ORAL DAILY
Qty: 30 TABLET | Refills: 3 | Status: SHIPPED | OUTPATIENT
Start: 2025-01-28

## 2025-01-28 RX ORDER — ACETYLCYSTEINE 200 MG/ML
600 SOLUTION ORAL; RESPIRATORY (INHALATION) 2 TIMES DAILY PRN
Qty: 60 EACH | Refills: 1 | Status: SHIPPED | OUTPATIENT
Start: 2025-01-28

## 2025-01-28 RX ORDER — BISACODYL 5 MG/1
5 TABLET, DELAYED RELEASE ORAL DAILY
Qty: 60 TABLET | Refills: 0 | Status: SHIPPED | OUTPATIENT
Start: 2025-01-29

## 2025-01-28 RX ORDER — METOPROLOL TARTRATE 50 MG
50 TABLET ORAL 2 TIMES DAILY
Qty: 60 TABLET | Refills: 3 | Status: SHIPPED | OUTPATIENT
Start: 2025-01-28

## 2025-01-28 RX ORDER — FAMOTIDINE 20 MG/1
20 TABLET, FILM COATED ORAL DAILY
Qty: 60 TABLET | Refills: 3 | Status: SHIPPED | OUTPATIENT
Start: 2025-01-28

## 2025-01-28 RX ORDER — FERROUS SULFATE 325(65) MG
325 TABLET ORAL 2 TIMES DAILY WITH MEALS
Qty: 30 TABLET | Refills: 3 | Status: SHIPPED | OUTPATIENT
Start: 2025-01-28

## 2025-01-28 RX ORDER — OXYCODONE HYDROCHLORIDE 5 MG/1
5 TABLET ORAL EVERY 4 HOURS PRN
Qty: 20 TABLET | Refills: 0 | Status: SHIPPED | OUTPATIENT
Start: 2025-01-28 | End: 2025-01-31

## 2025-01-28 RX ADMIN — OXYCODONE HYDROCHLORIDE 10 MG: 5 TABLET ORAL at 17:22

## 2025-01-28 RX ADMIN — METHOCARBAMOL 750 MG: 750 TABLET ORAL at 14:06

## 2025-01-28 RX ADMIN — FUROSEMIDE 40 MG: 40 TABLET ORAL at 07:30

## 2025-01-28 RX ADMIN — HEPARIN SODIUM 5000 UNITS: 5000 INJECTION INTRAVENOUS; SUBCUTANEOUS at 05:39

## 2025-01-28 RX ADMIN — METHOCARBAMOL 750 MG: 750 TABLET ORAL at 21:06

## 2025-01-28 RX ADMIN — HEPARIN SODIUM 5000 UNITS: 5000 INJECTION INTRAVENOUS; SUBCUTANEOUS at 22:50

## 2025-01-28 RX ADMIN — HEPARIN SODIUM 5000 UNITS: 5000 INJECTION INTRAVENOUS; SUBCUTANEOUS at 14:06

## 2025-01-28 RX ADMIN — ALBUTEROL SULFATE 2.5 MG: 2.5 SOLUTION RESPIRATORY (INHALATION) at 20:22

## 2025-01-28 RX ADMIN — CEPHALEXIN 500 MG: 250 CAPSULE ORAL at 21:07

## 2025-01-28 RX ADMIN — FERROUS SULFATE TAB 325 MG (65 MG ELEMENTAL FE) 325 MG: 325 (65 FE) TAB at 07:31

## 2025-01-28 RX ADMIN — FAMOTIDINE 20 MG: 20 TABLET, FILM COATED ORAL at 07:31

## 2025-01-28 RX ADMIN — ATORVASTATIN CALCIUM 40 MG: 40 TABLET, FILM COATED ORAL at 21:07

## 2025-01-28 RX ADMIN — METHOCARBAMOL 750 MG: 750 TABLET ORAL at 07:31

## 2025-01-28 RX ADMIN — METHOCARBAMOL 750 MG: 750 TABLET ORAL at 17:22

## 2025-01-28 RX ADMIN — ASPIRIN 81 MG: 81 TABLET, CHEWABLE ORAL at 07:31

## 2025-01-28 RX ADMIN — PREGABALIN 50 MG: 25 CAPSULE ORAL at 07:30

## 2025-01-28 RX ADMIN — PREGABALIN 50 MG: 25 CAPSULE ORAL at 21:07

## 2025-01-28 RX ADMIN — METOPROLOL TARTRATE 50 MG: 50 TABLET, FILM COATED ORAL at 07:31

## 2025-01-28 RX ADMIN — OXYCODONE HYDROCHLORIDE 10 MG: 5 TABLET ORAL at 07:29

## 2025-01-28 RX ADMIN — METOPROLOL TARTRATE 50 MG: 50 TABLET, FILM COATED ORAL at 21:07

## 2025-01-28 RX ADMIN — CLOPIDOGREL BISULFATE 75 MG: 75 TABLET ORAL at 07:31

## 2025-01-28 RX ADMIN — FERROUS SULFATE TAB 325 MG (65 MG ELEMENTAL FE) 325 MG: 325 (65 FE) TAB at 17:22

## 2025-01-28 ASSESSMENT — PAIN DESCRIPTION - LOCATION
LOCATION: GENERALIZED
LOCATION: LEG
LOCATION: CHEST;GENERALIZED

## 2025-01-28 ASSESSMENT — PAIN SCALES - GENERAL
PAINLEVEL_OUTOF10: 7
PAINLEVEL_OUTOF10: 5
PAINLEVEL_OUTOF10: 7

## 2025-01-28 ASSESSMENT — PAIN DESCRIPTION - DESCRIPTORS
DESCRIPTORS: ACHING;SORE
DESCRIPTORS: ACHING;SORE

## 2025-01-28 NOTE — PROGRESS NOTES
Occupational Therapy  Discharge Summary     Name:Dawood Epps  MRN:[id number]  :1955  Treatment Diagnosis: decreased endurance and mobility  Diagnosis: CABGx4    Restrictions/Precautions  Restrictions/Precautions: General Precautions, Cardiac  Required Braces or Orthoses?: No           Position Activity Restriction  Sternal Precautions: 5# Lifting Restrictions, No Pushing, No Pulling     Goals:   Patient Goals   Patient goals : \"Be able to do my normal things and go home\"  Short Term Goals  Time Frame for Short Term Goals: 7 days ()  Short Term Goal 1: Perform grooming independently at sink- goal met   Short Term Goal 2: Perform UE dressing independently- goal met   Short Term Goal 3: Perform toilet transfer with Mod I (with  RTS if needed)- goal met   Short Term Goal 4: Demo sternal precautions during ADLs and transfers independently- MET 25  Short Term Goal 5: Pt will verbalize and demonstrate understanding of 3/3 sternal px- MET 25 followed and reported all sternal px throughout session for all activities  Long Term Goals  Time Frame for Long Term Goals : 11 days ()  Long Term Goal 1: Perform UE/LE bathing with SBA- goal met   Long Term Goal 2: Perform LE dressing with SBA- goal met   Long Term Goal 3: Perform shower transfer with SBA and shower seaat- goal met   Long Term Goal 4: Perform toileting independently- goal met     Pt. Met 5/5 short term goals and 4/4 long term goals.     ADL:  Feeding  Assistance Level: Independent  Skilled Clinical Factors: erica mgmt  Grooming/Oral Hygiene  Assistance Level: Independent  Skilled Clinical Factors: applies deodorant in seated  Upper Extremity Bathing  Assistance Level: Modified independent  Skilled Clinical Factors: seated on TTB, pt aware of patting incision and avoiding spraying incision directly  Lower Extremity Bathing  Assistance Level: Modified independent  Skilled Clinical Factors: combination of

## 2025-01-28 NOTE — PLAN OF CARE
Problem: Pain  Goal: Verbalizes/displays adequate comfort level or baseline comfort level  1/28/2025 1048 by Peewee Camacho, RN  Outcome: Progressing  1/27/2025 2207 by Cate Lin, RN  Outcome: Progressing

## 2025-01-28 NOTE — CARE COORDINATION
CM update: Spoke with the team who confirms that discharge will be moved to tomorrow 1/29.     Called and spoke with Rebecca from [facility] to make aware of earlier discharge home. Reports no barriers to accommodate earlier discharge.    Spoke with patient who reports feeling good about discharging home tomorrow. Informed patient that [facility] will be providing HHC for patient once home . Patient understands that no DME will be provided at discharge. Patient has no further questions/concerns.     Called and left voicemail for wife Anika reinforcing adjustment to discharge plan. Reinforced to call back with any questions/concerns.    I do not anticipate any further needs from case management.    Cornelia Grover, RN

## 2025-01-28 NOTE — PROGRESS NOTES
CVTS Thoracic Progress Note:          CC:  Post op follow up    HPI:  Mr. Epps is a 69 y.o. male, known to service who underwent CABG x 4 on 1/14/24. His post operative course was prolonged due to continued SOB,tachycardia, ambulatory status and pulmonary expansion.  Post operatively he required frequent encouragement and re-education on participating in post op therapies.  Prior to discharge to ARU his CXR had showed stable, but persistent atelectasis vs small pleural effusion.  Clinically, this was felt to likely be related to post op atelectasis, but decision was made to D/C to ARU on Lasix PO x 4 days and F/U CXR on 1/24 to re-evaluate.    Subj: Denies current SOB. He does endorse a head cold and increased phlegm. Family at bedside asking for ABX to prescribed on D/C.    Obj: Blood pressure (!) 92/59, pulse 80, temperature 98.2 °F (36.8 °C), temperature source Oral, resp. rate 18, height 1.854 m (6' 0.99\"), weight 78 kg (172 lb), SpO2 97%.    Alert, oriented   Cardiovascular: S1 S2 normal.  Pulmonary: Faint expiratory crackling in posterior LLL.    Abdomen: Appears non-distended and soft.    Diagnostics:   CBC with Differential:    Lab Results   Component Value Date/Time    WBC 8.3 01/27/2025 05:53 AM    RBC 3.09 01/27/2025 05:53 AM    HGB 9.3 01/27/2025 05:53 AM    HCT 28.2 01/27/2025 05:53 AM     01/27/2025 05:53 AM    MCV 91.3 01/27/2025 05:53 AM    MCH 30.1 01/27/2025 05:53 AM    MCHC 33.0 01/27/2025 05:53 AM    RDW 16.9 01/27/2025 05:53 AM    BANDSPCT 3 02/14/2024 07:02 AM    LYMPHOPCT 21.8 01/27/2025 05:53 AM    MONOPCT 10.5 01/27/2025 05:53 AM    EOSPCT 5.5 01/27/2025 05:53 AM    BASOPCT 0.8 01/27/2025 05:53 AM    MONOSABS 0.9 01/27/2025 05:53 AM    LYMPHSABS 1.8 01/27/2025 05:53 AM    EOSABS 0.5 01/27/2025 05:53 AM    BASOSABS 0.1 01/27/2025 05:53 AM     CMP:    Lab Results   Component Value Date/Time     01/27/2025 05:53 AM    K 4.4 01/27/2025 05:53 AM     01/27/2025 05:53 AM    CO2

## 2025-01-28 NOTE — PROGRESS NOTES
Physical Therapy  Discharge Summary    Name:Dawood Epps  MRN:[id number]  :1955  Treatment Diagnosis: decreased endurance and mobility  Diagnosis: CABGx4    Restrictions/Precautions  Restrictions/Precautions: General Precautions, Cardiac  Required Braces or Orthoses?: No           Position Activity Restriction  Sternal Precautions: 5# Lifting Restrictions, No Pushing, No Pulling     Goals:                  Short Term Goals  Time Frame for Short Term Goals: 4 days (25)  Short Term Goal 1: pt will complete bed mobility with supv - MET 25  Short Term Goal 2: pt will perform functional transfers with LRAD and supv - MET 25  Short Term Goal 3: pt will ambulate 150 ft with LRAD and supv - MET 25  Short Term Goal 4: pt will perform 4 stairs with HR and supv - MET 25              Long Term Goals  Time Frame for Long Term Goals : 11 days (25)  Long Term Goal 1: pt will complete bed mobility with mod-ind - MET 25  Long Term Goal 2: pt will perform functional transfers with LRAD and mod-ind - MET 25  Long Term Goal 3: pt will ambulate 200 ft with LRAD and independent - MET 25  Long Term Goal 4: pt will perform 8 stairs with mod-ind - MET 25    Pt. Met 4/4 short term goals and 4/4 long term goals.     Pt. Currently ambulates 400 feet without AD with (I).  Up/down 12 steps with (I).  Sit to/from stand with (I).  Bed mobility with modified(I).  Recommend initial 24-hr sup/assist from family for safety upon returning home.  Pt. Safe to return home with 24-hr sup/assistance from family and home PT services.   Provided pt. with educated re: progress with PT while in ARU, meeting of all therapy goals, sternal precautions, and activity pacing.      Electronically signed by Janneth Cordoba, PT on 2025 at 3:18 PM

## 2025-01-28 NOTE — PLAN OF CARE
Problem: Chronic Conditions and Co-morbidities  Goal: Patient's chronic conditions and co-morbidity symptoms are monitored and maintained or improved  1/27/2025 2207 by Cate Lin RN  Outcome: Progressing     Problem: Safety - Adult  Goal: Free from fall injury  1/27/2025 2207 by Cate Lin, RN  Outcome: Progressing  1/27/2025 0927 by Peewee Camacho, RN  Outcome: Progressing     Problem: Pain  Goal: Verbalizes/displays adequate comfort level or baseline comfort level  1/27/2025 2207 by Cate Lin, RN  Outcome: Progressing

## 2025-01-28 NOTE — PROGRESS NOTES
Occupational Therapy  Facility/Department: [facility]  Rehabilitation Occupational Therapy Daily Treatment Note    Date: 25  Patient Name: Dawood Epps       Room: 0160/0160-02  MRN: [id number]  Account: [id number]   : 1955  (69 y.o.) Gender: male                    Past Medical History:  has a past medical history of Aphasia, Asthma, Cerebrovascular disease, COPD (chronic obstructive pulmonary disease) (HCC), Hepatitis C, History of hepatitis C, Hyperlipidemia, Hypertension, MI (myocardial infarction) (HCC), Peripheral vascular disease (HCC), and Stroke (HCC).  Past Surgical History:   has a past surgical history that includes hernia repair (Bilateral, ); Carotid artery surgery (Bilateral, ); Abdominal aortic aneurysm repair (); Tonsillectomy and adenoidectomy; XR Cardiac Cath Procedure (2025); Cardiac procedure (N/A, 2025); and Coronary artery bypass graft (N/A, 2025).    Restrictions  Restrictions/Precautions: General Precautions, Cardiac  Required Braces or Orthoses?: No    Subjective  Subjective: Pt in chair at OT arrival. Denies pain. /71, HR 67, Spo2 98% on RA  Restrictions/Precautions: General Precautions;Cardiac             Objective     Cognition  Overall Cognitive Status: WFL  Orientation  Overall Orientation Status: Within Normal Limits  Orientation Level: Oriented X4         ADL  Feeding  Assistance Level: Independent  Skilled Clinical Factors: erica mgmt  Grooming/Oral Hygiene  Assistance Level: Independent  Skilled Clinical Factors: applies deodorant in seated  Upper Extremity Bathing  Assistance Level: Modified independent  Skilled Clinical Factors: seated on TTB, pt aware of patting incision and avoiding spraying incision directly  Lower Extremity Bathing  Assistance Level: Modified independent  Skilled Clinical Factors: combination of standing/sitting to wash BLE and nydia/buttocks  Upper Extremity Dressing  Assistance Level: Independent  Skilled Clinical

## 2025-01-28 NOTE — PROGRESS NOTES
Department of Physical Medicine & Rehabilitation  Progress Note    Patient Identification:  Dawood Epps  [id number]  : 1955  Admit date: 2025    Chief Complaint: S/P CABG (coronary artery bypass graft)    Subjective:   Seen in his room this morning.  Patient is doing very well in therapy and is hoping to get home ASAP.  Discussed with insurance yesterday and peer to peer phone call and patient has been approved through tomorrow.  Patient plans on discharging home tomorrow.  ROS: No f/c, n/v, cp     Objective:  Patient Vitals for the past 24 hrs:   BP Temp Temp src Pulse Resp SpO2 Weight   25 1239 (!) 140/71 -- -- 89 -- 98 % --   25 0845 (!) 92/59 -- -- -- -- -- --   25 0759 117/73 -- -- -- -- -- --   25 0725 118/72 98.2 °F (36.8 °C) Oral 80 18 97 % --   25 0615 -- -- -- -- -- -- 78 kg (172 lb)   25 2211 -- -- -- -- 18 -- --   25 2130 127/69 98.1 °F (36.7 °C) Oral 95 18 95 % --   25 -- -- -- 86 18 97 % --   25 1738 -- -- -- -- 18 -- --     Const: Alert. No distress, pleasant.   HEENT: Normocephalic, atraumatic. Normal sclera/conjunctiva. MMM.   CV: Regular rate and rhythm.   Resp: No respiratory distress. Lungs CTAB.   Abd: Soft, nontender, nondistended, NABS+   Ext: + edema.   Neuro: Alert, oriented, appropriately interactive.   Psych: Cooperative, appropriate mood and affect    Laboratory data: Available via EMR.   Last 24 hour lab  No results found for this or any previous visit (from the past 24 hour(s)).        Therapy progress:  PT  Position Activity Restriction  Sternal Precautions: 5# Lifting Restrictions, No Pushing, No Pulling  Objective     Sit to Stand: Contact guard assistance  Stand to Sit: Contact guard assistance  Bed to Chair: Contact guard assistance (no AD)  Device: No Device  Assistance: Contact guard assistance, Stand by assistance  Distance: 160 ft x2 + 220 ft (level surface) + 10 ft (uneven surface)  OT  PT Equipment

## 2025-01-28 NOTE — PROCEDURES
PROCEDURE NOTE  Date: 1/28/2025   Name: Dawood Epps  YOB: 1955    Procedures  Image guided left Thoracentesis completed.  .8 liters of dark red colored withdrawn.  Pt tolerated procedure without any signs or symptoms of distress. Vital signs stable.     DISCHARGED:  ADMITTED: Pt transferred back to room 160. Report called to nurse.     SPECIMEN SENT:  No    Vital Signs  Vitals:    01/28/25 0845   BP: (!) 92/59   Pulse:    Resp:    Temp:    SpO2:     (vital signs in table format)

## 2025-01-28 NOTE — PROGRESS NOTES
Physical Therapy  Facility/Department: [facility]  Rehabilitation Physical Therapy Treatment Note  (Two Treatment Sessions)    NAME: Dawood Epps  : 1955 (69 y.o.)  MRN: [id number]  CODE STATUS: Full Code    Date of Service: 25       Restrictions:  Restrictions/Precautions: General Precautions, Cardiac  Position Activity Restriction  Sternal Precautions: 5# Lifting Restrictions, No Pushing, No Pulling     SUBJECTIVE  Subjective: Pt agreeable to work with PT this session, pleasant and cooperative.  Pt is excited he gets to go home tomorrow.  Pain: Pt denies pain at rest.  C/o 3/10 muscular \"soreness\" in B quadriceps with activity.    OBJECTIVE  Cognition  Overall Cognitive Status: WFL  Orientation  Overall Orientation Status: Within Normal Limits  Orientation Level: Oriented X4    Bed Mobility  Roll Left  Assistance Level: Independent  Roll Right  Assistance Level: Independent  Sit to Supine  Assistance Level: Modified independent  Skilled Clinical Factors: HOB elevated ~10-15 degrees  Supine to Sit  Assistance Level: Modified independent  Skilled Clinical Factors: HOB elevated ~10-15 degrees  Scooting  Assistance Level: Independent  Skilled Clinical Factors: To scoot forward to EOB    Balance  Sitting Balance: Independent  Standing Balance: Independent  Standing Balance  Activity: Without AD  Comments: Pt able to retrieve 5 beanbags consecutively from floor using squatting technique with (I).    Transfers  Sit to Stand  Assistance Level: Independent  Skilled Clinical Factors: No cues needed to maintain sternal precautions    Stand to Sit  Assistance Level: Independent  Skilled Clinical Factors: No cues needed to maintain sternal precautions    Bed To/From Chair  Technique: Stand step  Assistance Level: Independent  Skilled Clinical Factors: Without AD, no cues needed to maintain sternal precautions    Car Transfer  Assistance Level: Independent  Skilled Clinical Factors: Without AD, no cues needed to

## 2025-01-28 NOTE — PROCEDURES
PROCEDURE NOTE  Date: 1/28/2025   Name: Dawood Epps  YOB: 1955    Procedures  Pt arrived for image guided left Thoracentesis. Dr. Gtz explained the procedure including the risk and benefits of the procedure. All questions answered. Pt verbalizes understanding of the procedure and states no more questions. Consent confirmed. Vital signs stable. Labs, allergies, medications, and code status reviewed. No contraindications noted. Time out completed prior to procedure.    Vital Signs  Vitals:    01/28/25 0759   BP: 117/73   Pulse:    Resp:    Temp:    SpO2:     (vital signs in table format)      Allergies  Vicodin [hydrocodone-acetaminophen] (allergies)    Labs  Lab Results   Component Value Date    INR 0.95 01/27/2025    PROTIME 12.9 01/27/2025     Lab Results   Component Value Date    CREATININE 1.3 01/27/2025    BUN 18 01/27/2025     01/27/2025    K 4.4 01/27/2025     01/27/2025    CO2 27 01/27/2025     Lab Results   Component Value Date    WBC 8.3 01/27/2025    HGB 9.3 (L) 01/27/2025    HCT 28.2 (L) 01/27/2025    MCV 91.3 01/27/2025     (H) 01/27/2025

## 2025-01-29 ENCOUNTER — TELEPHONE (OUTPATIENT)
Dept: INTERNAL MEDICINE CLINIC | Age: 70
End: 2025-01-29

## 2025-01-29 VITALS
SYSTOLIC BLOOD PRESSURE: 126 MMHG | BODY MASS INDEX: 22.35 KG/M2 | HEIGHT: 73 IN | TEMPERATURE: 98.2 F | DIASTOLIC BLOOD PRESSURE: 68 MMHG | OXYGEN SATURATION: 94 % | RESPIRATION RATE: 16 BRPM | WEIGHT: 168.65 LBS | HEART RATE: 89 BPM

## 2025-01-29 LAB
ANION GAP SERPL CALCULATED.3IONS-SCNC: 9 MMOL/L (ref 3–16)
BASOPHILS # BLD: 0 K/UL (ref 0–0.2)
BASOPHILS NFR BLD: 0.6 %
BUN SERPL-MCNC: 20 MG/DL (ref 7–20)
CALCIUM SERPL-MCNC: 9.9 MG/DL (ref 8.3–10.6)
CHLORIDE SERPL-SCNC: 103 MMOL/L (ref 99–110)
CO2 SERPL-SCNC: 27 MMOL/L (ref 21–32)
CREAT SERPL-MCNC: 1.4 MG/DL (ref 0.8–1.3)
DEPRECATED RDW RBC AUTO: 16.3 % (ref 12.4–15.4)
EOSINOPHIL # BLD: 0.5 K/UL (ref 0–0.6)
EOSINOPHIL NFR BLD: 6 %
GFR SERPLBLD CREATININE-BSD FMLA CKD-EPI: 54 ML/MIN/{1.73_M2}
GLUCOSE SERPL-MCNC: 107 MG/DL (ref 70–99)
HCT VFR BLD AUTO: 29.5 % (ref 40.5–52.5)
HGB BLD-MCNC: 9.9 G/DL (ref 13.5–17.5)
LYMPHOCYTES # BLD: 1.7 K/UL (ref 1–5.1)
LYMPHOCYTES NFR BLD: 22.5 %
MCH RBC QN AUTO: 30.1 PG (ref 26–34)
MCHC RBC AUTO-ENTMCNC: 33.5 G/DL (ref 31–36)
MCV RBC AUTO: 89.9 FL (ref 80–100)
MONOCYTES # BLD: 0.9 K/UL (ref 0–1.3)
MONOCYTES NFR BLD: 11.6 %
NEUTROPHILS # BLD: 4.4 K/UL (ref 1.7–7.7)
NEUTROPHILS NFR BLD: 59.3 %
PLATELET # BLD AUTO: 599 K/UL (ref 135–450)
PMV BLD AUTO: 7.7 FL (ref 5–10.5)
POTASSIUM SERPL-SCNC: 4 MMOL/L (ref 3.5–5.1)
RBC # BLD AUTO: 3.28 M/UL (ref 4.2–5.9)
SODIUM SERPL-SCNC: 139 MMOL/L (ref 136–145)
WBC # BLD AUTO: 7.5 K/UL (ref 4–11)

## 2025-01-29 PROCEDURE — 94640 AIRWAY INHALATION TREATMENT: CPT

## 2025-01-29 PROCEDURE — 6370000000 HC RX 637 (ALT 250 FOR IP)

## 2025-01-29 PROCEDURE — 36415 COLL VENOUS BLD VENIPUNCTURE: CPT

## 2025-01-29 PROCEDURE — 80048 BASIC METABOLIC PNL TOTAL CA: CPT

## 2025-01-29 PROCEDURE — 6360000002 HC RX W HCPCS: Performed by: STUDENT IN AN ORGANIZED HEALTH CARE EDUCATION/TRAINING PROGRAM

## 2025-01-29 PROCEDURE — 85025 COMPLETE CBC W/AUTO DIFF WBC: CPT

## 2025-01-29 PROCEDURE — 6370000000 HC RX 637 (ALT 250 FOR IP): Performed by: PHYSICAL MEDICINE & REHABILITATION

## 2025-01-29 PROCEDURE — 6370000000 HC RX 637 (ALT 250 FOR IP): Performed by: STUDENT IN AN ORGANIZED HEALTH CARE EDUCATION/TRAINING PROGRAM

## 2025-01-29 PROCEDURE — 6370000000 HC RX 637 (ALT 250 FOR IP): Performed by: THORACIC SURGERY (CARDIOTHORACIC VASCULAR SURGERY)

## 2025-01-29 RX ORDER — CEPHALEXIN 500 MG/1
500 CAPSULE ORAL EVERY 12 HOURS SCHEDULED
Qty: 12 CAPSULE | Refills: 0 | Status: SHIPPED | OUTPATIENT
Start: 2025-01-29 | End: 2025-02-04

## 2025-01-29 RX ADMIN — PREGABALIN 50 MG: 25 CAPSULE ORAL at 09:13

## 2025-01-29 RX ADMIN — CEPHALEXIN 500 MG: 250 CAPSULE ORAL at 09:13

## 2025-01-29 RX ADMIN — FUROSEMIDE 40 MG: 40 TABLET ORAL at 09:17

## 2025-01-29 RX ADMIN — METHOCARBAMOL 750 MG: 750 TABLET ORAL at 09:13

## 2025-01-29 RX ADMIN — FLUTICASONE PROPIONATE 2 SPRAY: 50 SPRAY, METERED NASAL at 09:13

## 2025-01-29 RX ADMIN — FERROUS SULFATE TAB 325 MG (65 MG ELEMENTAL FE) 325 MG: 325 (65 FE) TAB at 09:13

## 2025-01-29 RX ADMIN — HEPARIN SODIUM 5000 UNITS: 5000 INJECTION INTRAVENOUS; SUBCUTANEOUS at 06:14

## 2025-01-29 RX ADMIN — OXYCODONE HYDROCHLORIDE 10 MG: 5 TABLET ORAL at 10:29

## 2025-01-29 RX ADMIN — ALBUTEROL SULFATE 2.5 MG: 2.5 SOLUTION RESPIRATORY (INHALATION) at 07:15

## 2025-01-29 RX ADMIN — BISACODYL 5 MG: 5 TABLET, COATED ORAL at 09:13

## 2025-01-29 RX ADMIN — CLOPIDOGREL BISULFATE 75 MG: 75 TABLET ORAL at 09:13

## 2025-01-29 RX ADMIN — METOPROLOL TARTRATE 50 MG: 50 TABLET, FILM COATED ORAL at 09:13

## 2025-01-29 RX ADMIN — ASPIRIN 81 MG: 81 TABLET, CHEWABLE ORAL at 09:13

## 2025-01-29 RX ADMIN — FAMOTIDINE 20 MG: 20 TABLET, FILM COATED ORAL at 09:13

## 2025-01-29 ASSESSMENT — PAIN SCALES - GENERAL
PAINLEVEL_OUTOF10: 2
PAINLEVEL_OUTOF10: 6
PAINLEVEL_OUTOF10: 7

## 2025-01-29 NOTE — PROGRESS NOTES
ACUTE REHAB UNIT: DISCHARGE  [facility]    Patient:Dawood Epps     Rehab Dx/Hx: S/P CABG (coronary artery bypass graft) [Z95.1]   :1955  MRN:[id number]  Date of Admit: 2025   Date of Discharge: 2025    Subjective:   Order for patient discharge.  Reviewed discharge summary (AVS) with patient and family including medications, physical instructions (safety) and diet. Pt in stable condition.     Reconciled Medication List - Patient:   Medications were reviewed by RN at time of discharge with patient and/or family:  []  Via EMR   []  Via health information exchange  [x]  Verbal (e.g. in person, telephone, video conferencing)  [x]  Paper-based (e.g. fax, copies, printouts)   []  Other Methods (e.g. texting, email, CDs)    Reconciled Medication List - Subsequent provider:   [] No, current reconciled medication list not provided to the subsequent provider.  [] Yes, current reconciled medication list provided to the subsequent provider.   [x] Via EMR    [] Via health information exchange  [] Verbal (e.g. in person, telephone, video conferencing)  [] Paper-based (e.g. fax, copies, printouts)   [] Other Methods (e.g. texting, email, CDs)    Discharge disposition:  Pt was discharged via:  [x]  Wheelchair  []  Stretcher  []  Safe ambulation and use of assistive device  []  Other:     Pt was discharged to:  [x]  Private car  []  Transportation service to next destination  []  Other:     Discharge destination:  [x]  Home  []  Skilled Nursing Facility  []  Long Term Care  []  Other:     Has lack of transportation kept you from medical appointments, meetings, work, or ADL's? [] Yes, it has kept me from medical appointments or from getting my meds  [] Yes, It has kept me from non-medical meetings, appointments, work, or getting things I need  [x] No  [] Pt unable to respond  [] Pt declines to respond     How often do you need to have someone help you when you read instructions, pamphlets, or

## 2025-01-29 NOTE — TELEPHONE ENCOUNTER
Called [facility],  Spoke with Manisha. Patient has not been discharged from Hospital yet, but they are asking for verbal ok to follow up with nursing, OT, and PT at home. Orders will be faxed for signature.  Advised Yes,  will ok orders. I will f/up with patient to get hospital f/up scheduled.

## 2025-01-29 NOTE — CARE COORDINATION
Case Management Discharge Summary  [facility] - Acute Rehab Unit    Patient:Dawood Epps     Rehab Dx/Hx: S/P CABG (coronary artery bypass graft) [Z95.1]       Today's Date: 1/29/2025    Discharge to:  Home with wife Anika. [facility]: PT/OT/SLP   Pre-certification completed:  [] No       [] Yes     [x] N/A   Hospital Exemption Notification (HENS) completed:  [] No       [] Yes     [x] N/A   IMM given:  Yes 1/28/25       New Durable Medical Equipment ordered/agency:  None- wife reports purchasing recommended TTB   Transportation:  Agency used: private car via wife Anika  Ambulance form completed: [] No       [] Yes   [x] N/A       Confirmed discharge plan with:  Patient: [] No       [x] Yes  Family:  [] No       [x] Yes      Name: greg Epps  Contact number: [phone]  Facility/Agency, name: [facility]  ANDERS/AVS faxed  Phone number for report to facility: N/A  RN, name: Cortney HOPSON       Has lack of transportation kept you from medical appointments, meetings, work, or ADL's? [] Yes, it has kept me from medical appointments or from getting my meds  [] Yes, It has kept me from non-medical meetings, appointments, work, or getting things I need  [x] No  [] Pt unable to respond  [] Pt declines to respond     How often do you need to have someone help you when you read instructions, pamphlets, or other written material from your doctor or pharmacy? [x] Never                             [] Always  [] Rarely                            [] Patient declines to respond  [] Sometimes                    [] Patient unable to respond  [] Often       Note: Discharging nurse to complete ANDERS, reconcile AVS, and place final copy with patient's discharge packet. RN to ensure that written prescriptions for  Level II medications are sent with patient to the facility as per protocol.        Spoke with patient in room who reports feeling good about discharging home today. No further questions/concerns. No

## 2025-01-29 NOTE — PLAN OF CARE
Problem: Chronic Conditions and Co-morbidities  Goal: Patient's chronic conditions and co-morbidity symptoms are monitored and maintained or improved  1/28/2025 2357 by Mandy Barahona RN  Outcome: Progressing  1/28/2025 1048 by Peewee Camacho RN  Outcome: Progressing     Problem: Discharge Planning  Goal: Discharge to home or other facility with appropriate resources  1/28/2025 2357 by Mandy Barahona RN  Outcome: Progressing  1/28/2025 1048 by Peewee Camacho RN  Outcome: Progressing     Problem: Safety - Adult  Goal: Free from fall injury  1/28/2025 2357 by Mandy Barahona RN  Outcome: Progressing  1/28/2025 1048 by Peewee Camacho RN  Outcome: Progressing     Problem: ABCDS Injury Assessment  Goal: Absence of physical injury  1/28/2025 2357 by Mandy Barahona RN  Outcome: Progressing  1/28/2025 1048 by Peewee Camacho RN  Outcome: Progressing     Problem: Pain  Goal: Verbalizes/displays adequate comfort level or baseline comfort level  1/28/2025 2357 by Mandy Barahona RN  Outcome: Progressing  1/28/2025 1048 by Peewee Camacho RN  Outcome: Progressing     Problem: Hematologic - Adult  Goal: Maintains hematologic stability  Outcome: Progressing     Problem: Musculoskeletal - Adult  Goal: Return mobility to safest level of function  Outcome: Progressing     Problem: Skin/Tissue Integrity - Adult  Goal: Skin integrity remains intact  Outcome: Progressing

## 2025-01-29 NOTE — DISCHARGE SUMMARY
strength  how much to take     metoprolol tartrate 50 MG tablet  Commonly known as: LOPRESSOR  Take 1 tablet by mouth 2 times daily  What changed:   medication strength  how much to take     oxyCODONE 5 MG immediate release tablet  Commonly known as: ROXICODONE  Take 1 tablet by mouth every 4 hours as needed for Pain for up to 3 days. Max Daily Amount: 30 mg  What changed:   when to take this  reasons to take this            CONTINUE taking these medications      albuterol (2.5 MG/3ML) 0.083% nebulizer solution  Commonly known as: PROVENTIL  Take 3 mLs by nebulization every 6 hours as needed for Wheezing     aspirin 81 MG chewable tablet  Take 1 tablet by mouth daily     atorvastatin 40 MG tablet  Commonly known as: LIPITOR  Take 1 tablet by mouth nightly     bisacodyl 5 MG EC tablet  Commonly known as: DULCOLAX  Take 1 tablet by mouth daily     cetirizine 10 MG tablet  Commonly known as: ZYRTEC  TAKE 1 TABLET BY MOUTH AT BEDTIME     clopidogrel 75 MG tablet  Commonly known as: PLAVIX  Take 1 tablet by mouth daily     famotidine 20 MG tablet  Commonly known as: PEPCID  Take 1 tablet by mouth daily     ferrous sulfate 325 (65 Fe) MG tablet  Commonly known as: IRON 325  Take 1 tablet by mouth 2 times daily (with meals)     hydrOXYzine HCl 10 MG tablet  Commonly known as: ATARAX  Take 1 tablet by mouth 3 times daily as needed for Itching or Anxiety     methocarbamol 750 MG tablet  Commonly known as: ROBAXIN  Take 1 tablet by mouth 4 times daily for 10 days     Trelegy Ellipta 200-62.5-25 MCG/ACT Aepb inhaler  Generic drug: fluticasone-umeclidin-vilant  Inhale 1 puff into the lungs daily            STOP taking these medications      acetaminophen 500 MG tablet  Commonly known as: TYLENOL     magnesium oxide 400 (240 Mg) MG tablet  Commonly known as: MAG-OX     potassium chloride 10 MEQ extended release tablet  Commonly known as: KLOR-CON M               Where to Get Your Medications        These medications were sent to

## 2025-01-29 NOTE — PROGRESS NOTES
IRF CLIF Discharge Assessment  [facility] Acute Rehab Unit    Patient:Dawood Epps     Rehab Dx/Hx: S/P CABG (coronary artery bypass graft) [Z95.1]   :1955  MRN:[id number]  Date of Admit: 2025     Pain Effect on Sleep:  Over the past 5 days, how much of the time has pain made it hard for you to sleep at night?  []  0. Does not apply - I have not had any pain or hurting in the past 5 days  []  1. Rarely or not at all  [x]  2. Occasionally  []  3. Frequently  []  4. Almost constantly  []  8. Unable to answer    Over the past 5 days, how often have you limited your participation in rehabilitation therapy sessions due to pain?  []  0. Does not apply - I have not received rehabilitation therapy in the past 5 days  []  1. Rarely or not at all  [x]  2. Occasionally  []  3. Frequently  []  4. Almost constantly  []  8. Unable to answer    Pain Interference with Day-to-Day Activities:  Over the past 5 days, how often have you limited your day-to-day activities (excluding rehabilitation therapy session)?  []  1. Rarely or not at all  [x]  2. Occasionally  []  3. Frequently  []  4. Almost constantly  []  8. Unable to answer      High-Risk Drug Classes: Use and Indication   Is taking: Check if the pt is taking any medications by pharmacological classification  Indication noted: If column 1 is checked, check if there is an indication noted for all meds in the drug class Is taking  (check all that apply) Indication noted (check all that apply)   Antipsychotic [] []   Anticoagulant [x] [x]   Antibiotic [x] [x]   Opioid [x] [x]   Antiplatelet [] []   Hypoglycemic (including insulin) [] []   None of the above [] []     Special Treatments, Procedures, and Programs   Check all of the following treatments, procedures, and programs that apply on discharge.  On discharge (check all that apply)   Cancer Treatments    A1. Chemotherapy []   A2. IV []   A3. Oral []   A10. Other []   B1. Radiation []   Respiratory Therapies

## 2025-01-29 NOTE — FLOWSHEET NOTE
Discharge instructions reviewed with patient and family, all home meds reviewed, follow up appointments reviewed. Verbalized understandings. Patient discharged to home with home health 01/29/25 at 14:00

## 2025-01-29 NOTE — CARE COORDINATION
CM update: Spoke with wife Anika who verbalizes understanding of all confirmed discharge plans for tomorrow 1/29. Wife has no further questions/concerns r/t discharge planning.     Cornelia Grover RN

## 2025-01-29 NOTE — TELEPHONE ENCOUNTER
[facility] called stating the would like to see if you would follow for Nursing ot and pt. For any question call back number [phone] ask for Manisha

## 2025-01-30 ENCOUNTER — TELEPHONE (OUTPATIENT)
Dept: PULMONOLOGY | Age: 70
End: 2025-01-30

## 2025-01-30 ENCOUNTER — CARE COORDINATION (OUTPATIENT)
Dept: CASE MANAGEMENT | Age: 70
End: 2025-01-30

## 2025-01-30 DIAGNOSIS — J44.9 COPD MIXED TYPE (HCC): Primary | ICD-10-CM

## 2025-01-30 NOTE — CARE COORDINATION
Discharge summaries sent to insurance    Amos Cabral MPH, RRT  [facility] Admissions Supervisor-[facility]  (P)[phone]  (F)[phone]   Electronically signed by Amos Cabral on 1/30/2025 at 1:13 PM

## 2025-01-30 NOTE — CARE COORDINATION
Care Transitions Note    Initial Call - Call within 2 business days of discharge: Yes    Attempted to reach patient for transitions of care follow up. Unable to reach patient.    Outreach Attempts:   Multiple attempts to contact patient at phone numbers on file.   HIPAA compliant voicemail left for patient.     Patient: Dawood Epps Patient : 1955   MRN: <[id number]>   Reason for Admission: CABG  Discharge Date: 25  RURS: Readmission Risk Score: 19.1    Last Discharge Facility       Date Complaint Diagnosis Description Type Department Provider    25  S/P CABG (coronary artery bypass graft) Admission (Discharged) Claudia Gonzalez MD            Was this an external facility discharge? No    Follow Up Appointment:   Patient does not have a follow up appointment scheduled at time of call.  [facility] [place] PCP POOL   Future Appointments         Provider Specialty Dept Phone    2/10/2025 9:30 AM Winston Blue PA-C Cardiothoracic Surgery [phone]    2025 11:15 AM Blair Perera MD Cardiology [phone]    2025 8:30 AM Suha Ruff MD Internal Medicine [phone]            Plan for follow-up on next business day.      Noa Bernard RN

## 2025-01-30 NOTE — TELEPHONE ENCOUNTER
Patient states he needs a script for the nebulizer machine and syringes for the medicine to put in it. Please advise.

## 2025-01-31 ENCOUNTER — CARE COORDINATION (OUTPATIENT)
Dept: CASE MANAGEMENT | Age: 70
End: 2025-01-31

## 2025-01-31 NOTE — CARE COORDINATION
Care Transitions Note    Initial Call - Call within 2 business days of discharge: Yes    Attempted to reach patient for transitions of care follow up. Unable to reach patient.    Outreach Attempts:   HIPAA compliant voicemail left for patient.   Spoke with Rebecca with [facility] - nurse attempted to see pt yesterday and he declined services     Patient: Dawood Epps Patient : 1955   MRN: <[id number]>   Reason for Admission: CABG  Discharge Date: 25  RURS: Readmission Risk Score: 19.1    Last Discharge Facility       Date Complaint Diagnosis Description Type Department Provider    25  S/P CABG (coronary artery bypass graft) Admission (Discharged) Claudia Gonzalez MD            Was this an external facility discharge? No    Follow Up Appointment:   Patient does not have a follow up appointment scheduled at time of call.  [facility] msg pcp pool   Future Appointments         Provider Specialty Dept Phone    2/10/2025 11:00 AM Winston Blue PA-C Cardiothoracic Surgery [phone]    2025 11:15 AM Blair Perera MD Cardiology [phone]    3/6/2025 2:30 PM Rosa Caputo, APRN - CNP Pulmonology [phone]    2025 8:30 AM Suha Ruff MD Internal Medicine [phone]            No further follow-up call indicated   CT episode closed - CTN signed off pt.      Noa Bernard RN

## 2025-02-20 ENCOUNTER — OFFICE VISIT (OUTPATIENT)
Dept: CARDIOLOGY CLINIC | Age: 70
End: 2025-02-20
Payer: COMMERCIAL

## 2025-02-20 VITALS
SYSTOLIC BLOOD PRESSURE: 146 MMHG | WEIGHT: 174 LBS | HEART RATE: 71 BPM | OXYGEN SATURATION: 96 % | BODY MASS INDEX: 23.06 KG/M2 | HEIGHT: 73 IN | DIASTOLIC BLOOD PRESSURE: 80 MMHG

## 2025-02-20 DIAGNOSIS — Z95.1 S/P CABG X 4: Primary | ICD-10-CM

## 2025-02-20 DIAGNOSIS — B19.20 HEPATITIS C VIRUS INFECTION WITHOUT HEPATIC COMA, UNSPECIFIED CHRONICITY: ICD-10-CM

## 2025-02-20 DIAGNOSIS — J45.909 ASTHMA, UNSPECIFIED ASTHMA SEVERITY, UNSPECIFIED WHETHER COMPLICATED, UNSPECIFIED WHETHER PERSISTENT: ICD-10-CM

## 2025-02-20 DIAGNOSIS — J44.9 CHRONIC OBSTRUCTIVE PULMONARY DISEASE, UNSPECIFIED COPD TYPE (HCC): ICD-10-CM

## 2025-02-20 DIAGNOSIS — Z87.891 FORMER SMOKER: ICD-10-CM

## 2025-02-20 DIAGNOSIS — I73.9 PVD (PERIPHERAL VASCULAR DISEASE): ICD-10-CM

## 2025-02-20 DIAGNOSIS — I25.10 CAD IN NATIVE ARTERY: ICD-10-CM

## 2025-02-20 PROCEDURE — 3079F DIAST BP 80-89 MM HG: CPT | Performed by: INTERNAL MEDICINE

## 2025-02-20 PROCEDURE — 1123F ACP DISCUSS/DSCN MKR DOCD: CPT | Performed by: INTERNAL MEDICINE

## 2025-02-20 PROCEDURE — G2211 COMPLEX E/M VISIT ADD ON: HCPCS | Performed by: INTERNAL MEDICINE

## 2025-02-20 PROCEDURE — 3077F SYST BP >= 140 MM HG: CPT | Performed by: INTERNAL MEDICINE

## 2025-02-20 PROCEDURE — 99214 OFFICE O/P EST MOD 30 MIN: CPT | Performed by: INTERNAL MEDICINE

## 2025-02-20 RX ORDER — NEBIVOLOL 5 MG/1
5 TABLET ORAL 2 TIMES DAILY
Qty: 60 TABLET | Refills: 3 | Status: SHIPPED | OUTPATIENT
Start: 2025-02-20

## 2025-02-20 RX ORDER — ASPIRIN 325 MG
325 TABLET ORAL DAILY
Qty: 30 TABLET | Refills: 3 | Status: SHIPPED | OUTPATIENT
Start: 2025-02-20

## 2025-02-20 RX ORDER — LOSARTAN POTASSIUM 25 MG/1
25 TABLET ORAL DAILY
Qty: 30 TABLET | Refills: 3 | Status: SHIPPED | OUTPATIENT
Start: 2025-02-20

## 2025-02-20 NOTE — PROGRESS NOTES
CARDIOLOGY OFFICE VISIT        Patient Name: Dawood Epps  Primary Care physician: Suha Ruff MD    Reason for Referral/Chief Complaint: Dawood Epps is a 69 y.o. patient who is presenting to cardiology clinic today for follow up.      History of Present Illness:   Dawood Epps is a 69 y.o. with a past medical history of aphasia, asthma, COPD, Hepatitis C, Hyperlipidemia, HTN, MI, PVD, stroke, and s/p CABG x4 (LIMA to LAD, SVG to OM1 and OM 2, SVG PDA) and LAAC.     Patient presented to the ER 1/11/25 for chest pain, SOB and NICOLE. He also reported significant family history of premature CAD with both parents have CAD requiring bypass, his older sister who had a coronary bypass, and a brother who passed away at 35 from an MI. He underwent LHC, and underwent CABG x4 (LIMA to LAD, SVG to OM1 and OM 2, SVG PDA).   Today, 2/20/25, he states he is doing well. He presents with his daughter. He reports he was told Lipitor and Plavix were the same, so he discontinued Lipitor on his own. Provided education. He reports having a rash to his abdomen that happened after discharge from the hospital. He is concerned it could be a drug reaction.  He also reports having an episode of palpitations. He has quit smoking, and has been using nicotine gum to help.  Denies chest pain, SOB, dizziness and LE edema.    Past Medical History:   has a past medical history of Aphasia, Asthma, Cerebrovascular disease, COPD (chronic obstructive pulmonary disease) (HCC), Hepatitis C, History of hepatitis C, Hyperlipidemia, Hypertension, MI (myocardial infarction) (HCC), Peripheral vascular disease, and Stroke (HCC).    Surgical History:   has a past surgical history that includes hernia repair (Bilateral, 2017); Carotid artery surgery (Bilateral, 2018); Abdominal aortic aneurysm repair (2020); Tonsillectomy and adenoidectomy; XR Cardiac Cath Procedure (1/13/2025); Cardiac procedure (N/A, 1/13/2025); and Coronary artery bypass graft (N/A,

## 2025-02-20 NOTE — PATIENT INSTRUCTIONS
Plan:  Start Bystolic 5 mg twice per day for heart rate and blood pressure control.   Start losartan 25 mg daily for blood pressure control.  Start Aspirin 325 mg daily.   Stop Metoprolol tartrate due to rash.   Stop Plavix due to rash.  Continue Lipitor 40 mg nightly for cholesterol control.  Referral to cardiac rehab.   Recommend Eucerin cream or cortisone cream for rash/itching.  Follow up with me in 1 month.

## 2025-03-05 RX ORDER — PEAK FLOW METER
EACH MISCELLANEOUS
COMMUNITY
Start: 2025-02-04

## 2025-03-06 ENCOUNTER — OFFICE VISIT (OUTPATIENT)
Dept: PULMONOLOGY | Age: 70
End: 2025-03-06
Payer: COMMERCIAL

## 2025-03-06 VITALS
TEMPERATURE: 98.1 F | RESPIRATION RATE: 16 BRPM | HEART RATE: 77 BPM | SYSTOLIC BLOOD PRESSURE: 140 MMHG | OXYGEN SATURATION: 97 % | BODY MASS INDEX: 23.72 KG/M2 | DIASTOLIC BLOOD PRESSURE: 85 MMHG | HEIGHT: 73 IN | WEIGHT: 179 LBS

## 2025-03-06 DIAGNOSIS — I10 HTN (HYPERTENSION), BENIGN: ICD-10-CM

## 2025-03-06 DIAGNOSIS — J44.9 COPD, SEVERE (HCC): ICD-10-CM

## 2025-03-06 DIAGNOSIS — Z87.891 FORMER SMOKER: ICD-10-CM

## 2025-03-06 DIAGNOSIS — J43.2 CENTRILOBULAR EMPHYSEMA (HCC): Primary | ICD-10-CM

## 2025-03-06 PROCEDURE — 1123F ACP DISCUSS/DSCN MKR DOCD: CPT | Performed by: NURSE PRACTITIONER

## 2025-03-06 PROCEDURE — 3079F DIAST BP 80-89 MM HG: CPT | Performed by: NURSE PRACTITIONER

## 2025-03-06 PROCEDURE — 3077F SYST BP >= 140 MM HG: CPT | Performed by: NURSE PRACTITIONER

## 2025-03-06 PROCEDURE — 99214 OFFICE O/P EST MOD 30 MIN: CPT | Performed by: NURSE PRACTITIONER

## 2025-03-06 ASSESSMENT — ENCOUNTER SYMPTOMS
RHINORRHEA: 0
CHEST TIGHTNESS: 0
COUGH: 0
EYE PAIN: 0
SHORTNESS OF BREATH: 0
WHEEZING: 0
SORE THROAT: 0
ABDOMINAL PAIN: 0

## 2025-03-06 NOTE — PATIENT INSTRUCTIONS
Call with worsening symptoms such as increased shortness of breath, productive cough, wheezing or symptoms not responding to treatment plan.     Your current pulmonary medications are controlling/treating your COPD.  Stay compliant.  Reviewed present pulmonary medications and side effects.  Reviewed proper inhaler usage.  Remember to bring a list of pulmonary medications and any CPAP or BiPAP machines to your next appointment with the office.     Please keep all of your future appointments scheduled by [facility] [place] [facility] office. Out of respect for other patients and providers, you may be asked to reschedule your appointment if you arrive later than your scheduled appointment time. Appointments cancelled less than 24hrs in advance will be considered a no show. Patients with three missed appointments within 1 year or four missed appointments within 2 years can be dismissed from the practice.     Please be aware that our physicians are required to work in the Intensive Care Unit at [facility].  Your appointment may need to be rescheduled if they are designated to work during your appointment time.      You may receive a survey regarding the care you received during your visit.  Your input is valuable to us.  We encourage you to complete and return your survey.  We hope you will choose us in the future for your healthcare needs.     Pt instructed of all future appointment dates & times, including radiology, labs, procedures & referrals. If procedures were scheduled preparation instructions provided. Instructions on future appointments with [facility] were given.     In the next few weeks, you will be receiving a survey from [facility] regarding your visit today.  We would greatly appreciate it if you would take just a few minutes to fill that out.  It is very important to us that our patients receive top notch care and our surveys help keep us accountable. However, if

## 2025-03-06 NOTE — PROGRESS NOTES
MA Communication:  The following orders are received by verbal communication from Rosa Caputo CNP    Orders include:  6 mon Francesca  
wheezing. This is controlling his COPD.   Discussed when to call with worsening symptoms such as increased shortness of breath, productive cough, wheezing or symptoms not responding to treatment plan.        Smoking cessation counseling provided. Individualized cessation plan includes continued smoking cessation.     Blood pressure today is slightly elevated. Continue to monitor, continue current medication regimen per cardiology.        NIDIA CERDA, APRN - CNP

## 2025-03-07 ENCOUNTER — TELEPHONE (OUTPATIENT)
Dept: PULMONOLOGY | Age: 70
End: 2025-03-07

## 2025-03-07 NOTE — TELEPHONE ENCOUNTER
If he has not smoked in over a few weeks, regular gum is fine since nicotine is no longer in his system and physical withdrawal would be gone. However if hs is still smoking occasionally, he should start nicotine gum to use for cravings.

## 2025-03-07 NOTE — TELEPHONE ENCOUNTER
Call and talk to patient's wife Anika and related Rosa'S CNP, message, she verbalize understanding, and will get regular gum(sugar free) for her .

## 2025-03-07 NOTE — TELEPHONE ENCOUNTER
Patient wife call and is asking if her  should continue taking nicotine gum (she state her  still thinking sometime to go back smoking) or if he should just get regular gum ( quote to her chewing gum help him no to go back smoking)    Please advise.

## 2025-03-20 ENCOUNTER — OFFICE VISIT (OUTPATIENT)
Dept: CARDIOLOGY CLINIC | Age: 70
End: 2025-03-20

## 2025-03-20 VITALS
HEIGHT: 73 IN | DIASTOLIC BLOOD PRESSURE: 78 MMHG | SYSTOLIC BLOOD PRESSURE: 128 MMHG | WEIGHT: 179.5 LBS | BODY MASS INDEX: 23.79 KG/M2 | HEART RATE: 78 BPM | OXYGEN SATURATION: 95 %

## 2025-03-20 DIAGNOSIS — Z95.1 S/P CABG X 4: Primary | ICD-10-CM

## 2025-03-20 DIAGNOSIS — I73.9 PVD (PERIPHERAL VASCULAR DISEASE): ICD-10-CM

## 2025-03-20 DIAGNOSIS — E78.2 MIXED HYPERLIPIDEMIA: ICD-10-CM

## 2025-03-20 DIAGNOSIS — I10 BENIGN ESSENTIAL HTN: ICD-10-CM

## 2025-03-20 DIAGNOSIS — Z87.891 FORMER SMOKER: ICD-10-CM

## 2025-03-20 DIAGNOSIS — J44.9 CHRONIC OBSTRUCTIVE PULMONARY DISEASE, UNSPECIFIED COPD TYPE (HCC): ICD-10-CM

## 2025-03-20 DIAGNOSIS — I25.10 CAD IN NATIVE ARTERY: ICD-10-CM

## 2025-03-20 RX ORDER — LOSARTAN POTASSIUM 25 MG/1
25 TABLET ORAL DAILY
Qty: 90 TABLET | Refills: 3 | Status: SHIPPED | OUTPATIENT
Start: 2025-03-20

## 2025-03-20 RX ORDER — ATORVASTATIN CALCIUM 40 MG/1
40 TABLET, FILM COATED ORAL NIGHTLY
Qty: 90 TABLET | Refills: 3 | Status: SHIPPED | OUTPATIENT
Start: 2025-03-20

## 2025-03-20 RX ORDER — NEBIVOLOL 5 MG/1
5 TABLET ORAL 2 TIMES DAILY
Qty: 180 TABLET | Refills: 3 | Status: SHIPPED | OUTPATIENT
Start: 2025-03-20

## 2025-03-20 NOTE — PROGRESS NOTES
its entirety.  I confirm that the note above accurately reflects all work, treatment, procedures, and medical decision making performed by me.  I, Blair Perera MD, personally performed the services described in this documentation as scribed by Kandis Lira RN in my presence, and it is both accurate and complete to the best of our ability.     I will address the patient's cardiac risk factors and adjusted pharmacologic treatment as needed. In addition, I have reinforced the need for patient directed risk factor modification.  All questions and concerns were addressed to the patient/family. Alternatives to my treatment were discussed.     Thank you for allowing us to participate in the care of Dawood Epps. Please call me with any questions [phone].    Blair Perera MD, [facility]  Cardiovascular Disease  [facility]  [phone] [place] [facility]  [phone] [place] [facility]  3/20/2025 1:30 PM

## 2025-03-20 NOTE — PATIENT INSTRUCTIONS
Plan:  Continue prescribed medications as ordered.  Recommend physical activity as tolerated.  Follow up with me in 6 months.

## 2025-03-27 ENCOUNTER — TELEPHONE (OUTPATIENT)
Dept: CARDIOLOGY CLINIC | Age: 70
End: 2025-03-27

## 2025-03-27 NOTE — TELEPHONE ENCOUNTER
Patient spouse Anika is calling into office to report patient has been taking atorvastatin 80 mg daily. Reviewed chart and appears on discharge atorvastatin was changed to 40 mg daily 01/21/25. She reports she went to  prescription for and notes atorvastatin is now 40 mg. Please advise, does appear in PCP Speedy, Suha COOPER MD office visit 10/07/24, atorvastatin 80 mg daily was recommended by neurology team.

## 2025-03-28 RX ORDER — ATORVASTATIN CALCIUM 80 MG/1
80 TABLET, FILM COATED ORAL NIGHTLY
Qty: 90 TABLET | Refills: 1 | Status: SHIPPED | OUTPATIENT
Start: 2025-03-28

## 2025-03-28 NOTE — TELEPHONE ENCOUNTER
Pt returned call back, relayed MAURO message. Pt v/u. Pt needs new prescription sent to pharmacy, it is pending.

## 2025-03-31 DIAGNOSIS — Z91.09 ENVIRONMENTAL ALLERGIES: ICD-10-CM

## 2025-03-31 RX ORDER — FLUTICASONE PROPIONATE 50 MCG
2 SPRAY, SUSPENSION (ML) NASAL DAILY
Qty: 16 G | OUTPATIENT
Start: 2025-03-31

## 2025-04-06 ASSESSMENT — PATIENT HEALTH QUESTIONNAIRE - PHQ9
2. FEELING DOWN, DEPRESSED OR HOPELESS: NOT AT ALL
SUM OF ALL RESPONSES TO PHQ QUESTIONS 1-9: 0
SUM OF ALL RESPONSES TO PHQ9 QUESTIONS 1 & 2: 0
SUM OF ALL RESPONSES TO PHQ QUESTIONS 1-9: 0
SUM OF ALL RESPONSES TO PHQ QUESTIONS 1-9: 0
2. FEELING DOWN, DEPRESSED OR HOPELESS: NOT AT ALL
1. LITTLE INTEREST OR PLEASURE IN DOING THINGS: NOT AT ALL
SUM OF ALL RESPONSES TO PHQ QUESTIONS 1-9: 0
1. LITTLE INTEREST OR PLEASURE IN DOING THINGS: NOT AT ALL

## 2025-04-07 ENCOUNTER — OFFICE VISIT (OUTPATIENT)
Dept: INTERNAL MEDICINE CLINIC | Age: 70
End: 2025-04-07

## 2025-04-07 VITALS
OXYGEN SATURATION: 94 % | DIASTOLIC BLOOD PRESSURE: 74 MMHG | HEIGHT: 73 IN | HEART RATE: 78 BPM | SYSTOLIC BLOOD PRESSURE: 138 MMHG | BODY MASS INDEX: 24.12 KG/M2 | WEIGHT: 182 LBS

## 2025-04-07 DIAGNOSIS — Z00.00 ENCOUNTER FOR WELL ADULT EXAM WITHOUT ABNORMAL FINDINGS: ICD-10-CM

## 2025-04-07 DIAGNOSIS — J43.9 BULLOUS EMPHYSEMA (HCC): ICD-10-CM

## 2025-04-07 DIAGNOSIS — Z91.09 ENVIRONMENTAL ALLERGIES: Primary | ICD-10-CM

## 2025-04-07 DIAGNOSIS — I10 BENIGN ESSENTIAL HTN: ICD-10-CM

## 2025-04-07 DIAGNOSIS — E78.5 DYSLIPIDEMIA: ICD-10-CM

## 2025-04-07 DIAGNOSIS — E78.2 MIXED HYPERLIPIDEMIA: ICD-10-CM

## 2025-04-07 RX ORDER — FLUTICASONE PROPIONATE 50 MCG
2 SPRAY, SUSPENSION (ML) NASAL DAILY
Qty: 1 EACH | Refills: 11 | Status: SHIPPED | OUTPATIENT
Start: 2025-04-07

## 2025-04-07 RX ORDER — FLUTICASONE FUROATE, UMECLIDINIUM BROMIDE AND VILANTEROL TRIFENATATE 200; 62.5; 25 UG/1; UG/1; UG/1
1 POWDER RESPIRATORY (INHALATION) DAILY
COMMUNITY

## 2025-04-07 RX ORDER — CETIRIZINE HYDROCHLORIDE 10 MG/1
10 TABLET ORAL NIGHTLY
Qty: 30 TABLET | Refills: 11 | Status: SHIPPED | OUTPATIENT
Start: 2025-04-07

## 2025-04-07 NOTE — PATIENT INSTRUCTIONS
Well Visit, Over 65: Care Instructions  Well visits can help you stay healthy. Your doctor has checked your overall health and may have suggested ways to take good care of yourself. Your doctor also may have recommended tests. You can help prevent illness with healthy eating, good sleep, vaccinations, regular exercise, and other steps.    Get the tests that you and your doctor decide on. Depending on your age and risks, examples might include hearing tests as well as screening for colon, breast, and lung cancer. Screening helps find diseases before any symptoms appear.   Eat healthy foods. Choose fruits, vegetables, whole grains, lean protein, and low-fat dairy foods. Limit saturated fat, and reduce salt.     Limit alcohol. Men should have no more than 2 drinks a day. Women should have no more than 1. For some people, no alcohol is the best choice.   Exercise. It can help prevent falls. Get at least 30 minutes of exercise on most days of the week. Walking, yoga, and ab chi can be good choices.     Reach and stay at your healthy weight. This will lower your risk for many health problems.   Take care of your mental health. Try to stay connected with friends, family, and community, and find ways to manage stress.     If you're feeling depressed or hopeless, talk to someone. A counselor can help. If you don't have a counselor, talk to your doctor.   Talk to your doctor if you think you may have a problem with alcohol or drug use. This includes prescription medicines and illegal drugs.     Avoid tobacco and nicotine: Don't smoke, vape, or chew. If you need help quitting, talk to your doctor.   Practice safer sex. Getting tested, using condoms or dental dams, and limiting sex partners can help prevent STIs.     Make an advance directive. This is a legal way to tell your family and doctor what you want to happen at the end of your life or when you can't speak for yourself.   Prevent problems where you can. Protect

## 2025-04-07 NOTE — PROGRESS NOTES
25 MG tablet Take 1 tablet by mouth daily 90 tablet 3    nebivolol (BYSTOLIC) 5 MG tablet Take 1 tablet by mouth in the morning and at bedtime 180 tablet 3    aspirin (KIRSTIN ASPIRIN) 325 MG tablet Take 1 tablet by mouth daily 30 tablet 3    ferrous sulfate (IRON 325) 325 (65 Fe) MG tablet Take 1 tablet by mouth 2 times daily (with meals) 30 tablet 3    bisacodyl (DULCOLAX) 5 MG EC tablet Take 1 tablet by mouth daily 60 tablet 0    famotidine (PEPCID) 20 MG tablet Take 1 tablet by mouth daily 60 tablet 3    albuterol (PROVENTIL) (2.5 MG/3ML) 0.083% nebulizer solution Take 3 mLs by nebulization every 6 hours as needed for Wheezing 240 mL 3    cetirizine (ZYRTEC) 10 MG tablet TAKE 1 TABLET BY MOUTH AT BEDTIME 30 tablet 11    fluticasone (FLONASE) 50 MCG/ACT nasal spray 2 sprays by Each Nostril route daily 1 each 11       Past Medical History:   Diagnosis Date    Aphasia 12/26/2017    Asthma     Cerebrovascular disease     COPD (chronic obstructive pulmonary disease) (HCC)     Hepatitis C     History of hepatitis C 12/26/2017    Treated with pills in the past    Hyperlipidemia     Hypertension     MI (myocardial infarction) (HCC)     Peripheral vascular disease     left upper leg stent per pt    Stroke (HCC) 2019     Past Surgical History:   Procedure Laterality Date    ABDOMINAL AORTIC ANEURYSM REPAIR  2020    CARDIAC CATH PROCEDURE  1/13/2025    CARDIAC PROCEDURE N/A 1/13/2025    Left heart cath / coronary angiography performed by Quiana Vidal MD at [facility] CARDIAC CATH LAB    CAROTID ARTERY SURGERY Bilateral 2018    CORONARY ARTERY BYPASS GRAFT N/A 1/14/2025    CORONARY ARTERY BYPASS GRAFT TIMES FOUR, CARDIOPULOMARY BYPASS, ENDOSCOPIC SAPHENOUS VEIN GRAFT HARVEST, POSTERIOR PERICARDIOTOMY, ULTRASOUND INTERROGATION OF GRAFTS, LEFT ATRIAL APPENDAGE CLIP PLACEMENT, TRANSESOPHAGEAL ECHOCARDIOGRAM, BILATERAL PECTORALIS BLOCKS performed by Esthela Gilbert MD at [facility] CVOR    HERNIA REPAIR Bilateral 2017    bilateral

## 2025-05-08 NOTE — TELEPHONE ENCOUNTER
Refill request for Trelegy medication.     Name of Pharmacy- [facility]      Last visit - 04/07/2025     Pending visit - no follow up on file     Last refill -04/08/2024          Additional Comments Dr. Ruff patient. Please send 30 day supply. Rx updated.

## 2025-05-09 RX ORDER — FLUTICASONE FUROATE, UMECLIDINIUM BROMIDE AND VILANTEROL TRIFENATATE 200; 62.5; 25 UG/1; UG/1; UG/1
1 POWDER RESPIRATORY (INHALATION) DAILY
Qty: 1 EACH | Refills: 0 | Status: SHIPPED | OUTPATIENT
Start: 2025-05-09

## 2025-06-10 RX ORDER — FLUTICASONE FUROATE, UMECLIDINIUM BROMIDE AND VILANTEROL TRIFENATATE 200; 62.5; 25 UG/1; UG/1; UG/1
1 POWDER RESPIRATORY (INHALATION) DAILY
Qty: 60 EACH | OUTPATIENT
Start: 2025-06-10

## 2025-06-20 DIAGNOSIS — J43.9 BULLOUS EMPHYSEMA (HCC): Primary | ICD-10-CM

## 2025-06-20 RX ORDER — FLUTICASONE FUROATE, UMECLIDINIUM BROMIDE AND VILANTEROL TRIFENATATE 200; 62.5; 25 UG/1; UG/1; UG/1
1 POWDER RESPIRATORY (INHALATION) DAILY
Qty: 60 EACH | Refills: 2 | Status: SHIPPED | OUTPATIENT
Start: 2025-06-20

## 2025-06-20 RX ORDER — FLUTICASONE FUROATE, UMECLIDINIUM BROMIDE AND VILANTEROL TRIFENATATE 200; 62.5; 25 UG/1; UG/1; UG/1
1 POWDER RESPIRATORY (INHALATION) DAILY
Qty: 1 EACH | Refills: 3 | Status: CANCELLED | OUTPATIENT
Start: 2025-06-20

## 2025-06-20 NOTE — PROGRESS NOTES
PEND'D orders. Pt spouse called and requested order for trelegy be sent to pharmacy. Stated that they have one dose remaining.     Last office visit 3/6/2025       Next office visit scheduled 9/9/2025    Requested Prescriptions     Pending Prescriptions Disp Refills    fluticasone-umeclidin-vilant (TRELEGY ELLIPTA) 200-62.5-25 MCG/ACT AEPB inhaler 1 each 0     Sig: Inhale 1 puff into the lungs daily

## 2025-06-23 RX ORDER — SENNOSIDES 8.6 MG
325 CAPSULE ORAL DAILY
Qty: 90 TABLET | Refills: 1 | Status: SHIPPED | OUTPATIENT
Start: 2025-06-23

## 2025-06-23 NOTE — TELEPHONE ENCOUNTER
Last Office Visit: 3/20/2025 Provider: MAURO  **Is provider OOT? Yes    CATY ROWE reviewing messages    Next Office Visit: 9/19/2025 Provider: MAURO    Lab orders needed? no

## 2025-08-04 RX ORDER — ALBUTEROL SULFATE 90 UG/1
INHALANT RESPIRATORY (INHALATION)
Qty: 18 G | Refills: 1 | Status: SHIPPED | OUTPATIENT
Start: 2025-08-04

## (undated) DEVICE — RADIFOCUS OPTITORQUE ANGIOGRAPHIC CATHETER: Brand: OPTITORQUE

## (undated) DEVICE — Device

## (undated) DEVICE — SUTURE PROL 4-0 L36IN NONABSORBABLE BLU V-7 L26MM 1/2 CIR 8975H

## (undated) DEVICE — TIP APPL TOP 2 SPRY

## (undated) DEVICE — MARKER GRAFT CORONARY BYPASS DISTAL S/S DISP

## (undated) DEVICE — NEEDLE HYPO 18GA L1.5IN PNK POLYPR HUB S STL REG BVL STR

## (undated) DEVICE — SOLUTION IRRIG 3000ML 0.9% SOD CHL USP UROMATIC PLAS CONT

## (undated) DEVICE — RESERVOIR,SUCTION,100CC,SILICONE: Brand: MEDLINE

## (undated) DEVICE — NEPTUNE E-SEP SMOKE EVACUATION PENCIL, COATED, 70MM BLADE, PUSH BUTTON SWITCH: Brand: NEPTUNE E-SEP

## (undated) DEVICE — KIT,ANTI FOG,W/SPONGE & FLUID,SOFT PACK: Brand: MEDLINE

## (undated) DEVICE — CANNULA PERF L125IN OD15FR POLYVI CHL VEN RG AUTO INFL SMOOT

## (undated) DEVICE — SPONGE LAP W18XL18IN WHT COT 4 PLY FLD STRUNG RADPQ DISP ST 2 PER PACK

## (undated) DEVICE — GOWN SIRUS NONREIN XL W/TWL: Brand: MEDLINE INDUSTRIES, INC.

## (undated) DEVICE — RETRACTOR SURG INSRT SUT HLD OCTOBASE

## (undated) DEVICE — Device: Brand: MEDEX

## (undated) DEVICE — 20 ML SYRINGE LUER-LOCK TIP: Brand: MONOJECT

## (undated) DEVICE — EVERGRIP INSERT SET 86MM: Brand: FOGARTY EVERGRIP

## (undated) DEVICE — TEMP PACING WIRE: Brand: MYO/WIRE

## (undated) DEVICE — GUIDEWIRE VASC L150CM DIA0.035IN FLX END L7CM J 3MM PTFE

## (undated) DEVICE — SPONGE GZ W4XL4IN COT RADPQ HIGHLY ABSRB STERILE

## (undated) DEVICE — SYSTEM ENDOSCP VES HARV W/ TOOL CANN SEAL SHT PRT BLNT TIP

## (undated) DEVICE — SUTURE PROL SZ 3-0 L36IN NONABSORBABLE BLU L26MM SH 1/2 CIR 8522H

## (undated) DEVICE — SUTURE NONABSORBABLE MONOFILAMENT 7-0 BV-1 1X24 IN PROLENE 8702H

## (undated) DEVICE — AGENT TOP HEMSTAT FOAM DISC STATSEAL L 10 - 14FR

## (undated) DEVICE — APPLICATOR LNG TP 12.5IN

## (undated) DEVICE — TUBING SUCT 10FR MAL ALUM SHFT FN CAP VENT UNIV CONN W/ OBT

## (undated) DEVICE — GLIDESHEATH SLENDER STAINLESS STEEL KIT: Brand: GLIDESHEATH SLENDER

## (undated) DEVICE — SUTURE PROL SZ 8-0 L18IN NONABSORBABLE BLU L6.5MM BV130-5 8730H

## (undated) DEVICE — DRAIN,WOUND,ROUND,24FR,5/16",FULL-FLUTED: Brand: MEDLINE

## (undated) DEVICE — CLIP SM RED INTERN HMOCLP TITAN LIGATING

## (undated) DEVICE — GAUZE,SPONGE,4"X4",16PLY,XRAY,STRL,LF: Brand: MEDLINE

## (undated) DEVICE — KIT COMPL CK0289R4  BB9L99R8

## (undated) DEVICE — ADAPTER PERF L7.5IN INLET LEG 3IN Y TYP VENT CLR CODE CLMP

## (undated) DEVICE — CANNULA ART 21FR L14IN VENT 3/8IN CONN SFT FLO ANG TIP W/

## (undated) DEVICE — BLADE OPHTH 180DEG CUT SURF BLU STR SHRP DBL BVL GRINDLESS

## (undated) DEVICE — BLADE ES ELASTOMERIC COAT INSUL DURABLE BEND UPTO 90DEG

## (undated) DEVICE — PUNCH AORT DIA4MM LNG HNDL

## (undated) DEVICE — BAND COMPR L24CM REG CLR PLAS HEMSTAT EXT HK AND LOOP RETEN

## (undated) DEVICE — THORACIC CATHETER, STRAIGHT, SILICONE, WITH CLOTSTOP®: Brand: AXIOM® ATRAUM™ WITH CLOTSTOP®

## (undated) DEVICE — SUTURE PERMAHAND SZ 0 L30IN NONABSORBABLE BLK L26MM SH 1/2 K834H

## (undated) DEVICE — CANNULA PERF L15IN DIA29FR VEN 3 STG THN WALL DSGN W  VENT

## (undated) DEVICE — SUTURE PROL SZ 6-0 L24IN NONABSORBABLE BLU L13MM C-1 3/8 8726H

## (undated) DEVICE — CATH LAB PACK: Brand: MEDLINE INDUSTRIES, INC.

## (undated) DEVICE — SYRINGE MED 10ML LUERLOCK TIP W/O SFTY DISP

## (undated) DEVICE — STANDARD HYPODERMIC NEEDLE,POLYPROPYLENE HUB: Brand: MONOJECT

## (undated) DEVICE — APPLICATOR SURG SEAL FIBRIJET

## (undated) DEVICE — SOLUTION IRRIG 1000ML 09% SOD CHL USP PIC PLAS CONTAINER

## (undated) DEVICE — SUTURE PROL SZ 4-0 L36IN NONABSORBABLE BLU L26MM SH 1/2 CIR 8521H

## (undated) DEVICE — HOVERMATT HALF-MATT 34" W X 45" L

## (undated) DEVICE — SUTURE VICRYL COAT SZ 4-0 L18IN ABSRB UD L19MM PS-2 1/2 CIR J496G

## (undated) DEVICE — LEAD PACEMKR MYOCARDIAL UNIPOLAR TEMP

## (undated) DEVICE — GLOVE SURG SZ 6 THK91MIL LTX FREE SYN POLYISOPRENE ANTI

## (undated) DEVICE — SUTURE NONABSORBABLE MONOFILAMENT 5-0 C-1 1X24 IN PROLENE 8725H

## (undated) DEVICE — SHIELD RAD ANGIO FEM ENTRY W/ ABSORBENT ORNG STRL RADPAD LTX

## (undated) DEVICE — CANNULA PERFUSION 5.5IN 9FR AORTIC ROOT

## (undated) DEVICE — APPLICATOR  COTTON-TIPPED 6 IN WOOD STRL

## (undated) DEVICE — SYSTEM SEAL 4.3MM PROX HEMSTAT HEARTSTRING III

## (undated) DEVICE — CONTAINER,SPECIMEN,OR STERILE,4OZ: Brand: MEDLINE

## (undated) DEVICE — APPLICATOR MEDICATED 10.5 CC SOLUTION HI LT ORNG CHLORAPREP

## (undated) DEVICE — EVERGRIP INSERT SET 61MM: Brand: FOGARTY EVERGRIP

## (undated) DEVICE — SUTURE PROL SZ 3-0 L36IN NONABSORBABLE BLU L17MM RB-1 1/2 8558H

## (undated) DEVICE — SUTURE VICRYL SZ 0 L27IN ABSRB UD L36MM CT-1 1/2 CIR J260H

## (undated) DEVICE — LINER SUCTION

## (undated) DEVICE — COVER LT HNDL PLAS RIG 2 PER PK

## (undated) DEVICE — ASPIRATION/ANTICOAGULATION SET: Brand: HAEMONETICS CELL SAVER SYSTEM

## (undated) DEVICE — CATH CTR VEN 3LUM INTRLNK INJ 9FRX11CM

## (undated) DEVICE — SUTURE VICRYL + SZ 3-0 L36IN ABSRB UD L36MM CT-1 1/2 CIR VCP944H

## (undated) DEVICE — LIQUIBAND RAPID ADHESIVE 36/CS 0.8ML: Brand: MEDLINE

## (undated) DEVICE — DRAIN SURG 19FR 0.25IN SIL RND W/ TRCR INDIC DOT RADPQ FULL

## (undated) DEVICE — [HIGH FLOW INSUFFLATOR,  DO NOT USE IF PACKAGE IS DAMAGED,  KEEP DRY,  KEEP AWAY FROM SUNLIGHT,  PROTECT FROM HEAT AND RADIOACTIVE SOURCES.]: Brand: PNEUMOSURE

## (undated) DEVICE — GEL US 20GM NONIRRITATING OVERWRAPPED FILE PCH TRNSMIT

## (undated) DEVICE — CATHETER THORACENTHESIS 9 FRX20 IN EYES TOP

## (undated) DEVICE — STERNUM BLADE, OFFSET (31.7 X 0.64 X 6.3MM)

## (undated) DEVICE — PROCEDURE SET TOP OUTLT 04257

## (undated) DEVICE — LINE TBL CSC-14 FOR CARDPLG DEL SYS

## (undated) DEVICE — KIT APPL 11:1 PROC W/ FIBRIJET MED CUP APPL TIP TY

## (undated) DEVICE — NEEDLE ECHOGENIC 22GA L3125IN INSUL W 30DEG BVL EXTN SET

## (undated) DEVICE — SOLUTION IV HEPARIN SODIUM SODIUM CHL 0.9% 500 ML INJ VIAFLX

## (undated) DEVICE — SURGIFOAM SPNG SZ 100

## (undated) DEVICE — COVER US PRB W12XL244CM SURGICAL INTRAOPERATIVE PLAS TAPR L

## (undated) DEVICE — GLOVE ORANGE PI 7 1/2   MSG9075

## (undated) DEVICE — BLADE ES L4IN INSUL EDGE

## (undated) DEVICE — KIT BLWR MISTER 5P 15L W/ TBNG SET IRRIG MIST TO IMPROVE

## (undated) DEVICE — DRAIN SURG SGL COLL PT TB FOR ATS BG OASIS

## (undated) DEVICE — TUBING, SUCTION, 3/16" X 12', STRAIGHT: Brand: MEDLINE

## (undated) DEVICE — CANNULA ART 21FR L145IN VENT CONN SFT FLOW EXT

## (undated) DEVICE — TIP APPL 20GA 4IN GEL PLT 2 CANN

## (undated) DEVICE — CANNULA PERF 7FR L5.5IN AORT ROOT RADPQ STD TIP W/ VENT LN

## (undated) DEVICE — CONNECTOR PERF W3/8XH0.25XL0.25IN BASE UNEQUAL Y SHP W/O

## (undated) DEVICE — 3M™ TEGADERM™ TRANSPARENT FILM DRESSING FRAME STYLE, 1624W, 2-3/8 IN X 2-3/4 IN (6 CM X 7 CM), 100/CT 4CT/CASE: Brand: 3M™ TEGADERM™

## (undated) DEVICE — SUTURE NONABSORBABLE MONOFILAMENT 6-0 C-1 1X30 IN PROLENE 8706H